# Patient Record
Sex: MALE | Race: BLACK OR AFRICAN AMERICAN | Employment: UNEMPLOYED | URBAN - METROPOLITAN AREA
[De-identification: names, ages, dates, MRNs, and addresses within clinical notes are randomized per-mention and may not be internally consistent; named-entity substitution may affect disease eponyms.]

---

## 2017-06-06 ENCOUNTER — TELEPHONE (OUTPATIENT)
Dept: SLEEP MEDICINE | Age: 47
End: 2017-06-06

## 2018-04-16 ENCOUNTER — TELEPHONE (OUTPATIENT)
Dept: SLEEP MEDICINE | Age: 48
End: 2018-04-16

## 2018-04-16 NOTE — TELEPHONE ENCOUNTER
I called the patient to schedule his sleep medicine consult. Patient states he will call back to set up this appointment he is a  and is on the road right now.

## 2018-06-17 ENCOUNTER — HOSPITAL ENCOUNTER (OUTPATIENT)
Dept: ULTRASOUND IMAGING | Age: 48
Discharge: HOME | End: 2018-06-17
Attending: FAMILY MEDICINE
Payer: COMMERCIAL

## 2018-06-17 DIAGNOSIS — N28.9 RENAL DISEASE: ICD-10-CM

## 2018-06-17 PROCEDURE — 76770 US EXAM ABDO BACK WALL COMP: CPT

## 2021-03-08 ENCOUNTER — OFFICE VISIT (OUTPATIENT)
Dept: FAMILY MEDICINE CLINIC | Age: 51
End: 2021-03-08

## 2021-03-08 VITALS
BODY MASS INDEX: 35.58 KG/M2 | HEART RATE: 81 BPM | SYSTOLIC BLOOD PRESSURE: 172 MMHG | WEIGHT: 240.2 LBS | TEMPERATURE: 97.6 F | HEIGHT: 69 IN | OXYGEN SATURATION: 97 % | DIASTOLIC BLOOD PRESSURE: 110 MMHG

## 2021-03-08 DIAGNOSIS — Z11.3 SCREEN FOR STD (SEXUALLY TRANSMITTED DISEASE): ICD-10-CM

## 2021-03-08 DIAGNOSIS — N63.10 LUMP OF RIGHT BREAST: ICD-10-CM

## 2021-03-08 DIAGNOSIS — I10 UNCONTROLLED HYPERTENSION: Primary | ICD-10-CM

## 2021-03-08 PROCEDURE — 84443 ASSAY THYROID STIM HORMONE: CPT

## 2021-03-08 PROCEDURE — 83036 HEMOGLOBIN GLYCOSYLATED A1C: CPT

## 2021-03-08 PROCEDURE — 86592 SYPHILIS TEST NON-TREP QUAL: CPT

## 2021-03-08 PROCEDURE — 85025 COMPLETE CBC W/AUTO DIFF WBC: CPT

## 2021-03-08 PROCEDURE — 87491 CHLMYD TRACH DNA AMP PROBE: CPT

## 2021-03-08 PROCEDURE — 99203 OFFICE O/P NEW LOW 30 MIN: CPT | Performed by: FAMILY MEDICINE

## 2021-03-08 PROCEDURE — 80061 LIPID PANEL: CPT

## 2021-03-08 PROCEDURE — 87389 HIV-1 AG W/HIV-1&-2 AB AG IA: CPT

## 2021-03-08 PROCEDURE — 80053 COMPREHEN METABOLIC PANEL: CPT

## 2021-03-08 RX ORDER — CLONIDINE HYDROCHLORIDE 0.3 MG/1
0.3 TABLET ORAL 2 TIMES DAILY
COMMUNITY
End: 2021-03-08 | Stop reason: SDUPTHER

## 2021-03-08 RX ORDER — AMLODIPINE BESYLATE 10 MG/1
10 TABLET ORAL DAILY
Qty: 90 TAB | Refills: 3 | Status: SHIPPED | OUTPATIENT
Start: 2021-03-08 | End: 2022-01-13

## 2021-03-08 RX ORDER — LISINOPRIL AND HYDROCHLOROTHIAZIDE 20; 25 MG/1; MG/1
TABLET ORAL DAILY
COMMUNITY
End: 2021-03-08 | Stop reason: SDUPTHER

## 2021-03-08 RX ORDER — AMLODIPINE BESYLATE 5 MG/1
5 TABLET ORAL DAILY
COMMUNITY
End: 2021-03-08

## 2021-03-08 RX ORDER — CLONIDINE HYDROCHLORIDE 0.3 MG/1
0.3 TABLET ORAL 2 TIMES DAILY
Qty: 90 TAB | Refills: 3 | Status: SHIPPED | OUTPATIENT
Start: 2021-03-08 | End: 2021-04-16 | Stop reason: SINTOL

## 2021-03-08 RX ORDER — LISINOPRIL AND HYDROCHLOROTHIAZIDE 20; 25 MG/1; MG/1
1 TABLET ORAL DAILY
Qty: 90 TAB | Refills: 3 | Status: SHIPPED
Start: 2021-03-08 | End: 2022-01-09

## 2021-03-08 NOTE — PROGRESS NOTES
Derrell Sheehan RN, gave to the pt the AVS information and also the information to call and make an appointment for a mammogram. Sara Garcia gave the Goodrx and explained the process and also the lab requisition for the patient go to [facility] to room 104.  Patient verbalized understanding

## 2021-03-08 NOTE — PROGRESS NOTES
HISTORY OF PRESENT ILLNESS  Maycol Singh is a 45 y.o. male. HPI  Patient states he has a history of hypertension diagnosed in 2007. 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 Clonidine was added and blood pressure with better control. He has felt a lump in the right breast and is tender,  Noticed it last week. Review of Systems   Constitutional: Negative for chills, fever and weight loss. Respiratory: Negative for cough, hemoptysis and sputum production. Cardiovascular: Negative for chest pain, palpitations and orthopnea. Lump of right breast   Gastrointestinal: Negative for heartburn, nausea and vomiting. Genitourinary: Negative for dysuria, frequency and urgency. Musculoskeletal: Negative for back pain, myalgias and neck pain. Skin: Negative for itching and rash. Neurological: Negative for dizziness, tingling, tremors and headaches. BP (!) 172/110 (BP 1 Location: Right arm, BP Patient Position: Sitting)   Pulse 81   Temp 97.6 °F (36.4 °C) (Temporal)   Ht 5' 8.9\" (1.75 m)   Wt 240 lb 3.2 oz (109 kg)   SpO2 97%   BMI 35.58 kg/m²   Physical Exam  Constitutional:       General: He is not in acute distress. Appearance: He is not ill-appearing. HENT:      Right Ear: Tympanic membrane normal. There is no impacted cerumen. Left Ear: Tympanic membrane normal. There is no impacted cerumen. Nose: Nose normal. No congestion. Mouth/Throat:      Mouth: Mucous membranes are moist.      Pharynx: No oropharyngeal exudate or posterior oropharyngeal erythema. Eyes:      Extraocular Movements: Extraocular movements intact. Pupils: Pupils are equal, round, and reactive to light. Cardiovascular:      Rate and Rhythm: Normal rate and regular rhythm. Pulses: Normal pulses. Heart sounds: Normal heart sounds. No murmur. Comments:  There is a 8 cm hard and tender palpable lump on the right breast  Pulmonary:      Effort: Pulmonary effort is normal. No respiratory distress. Breath sounds: Normal breath sounds. No wheezing or rhonchi. Abdominal:      General: Bowel sounds are normal. There is no distension. Palpations: Abdomen is soft. Tenderness: There is no abdominal tenderness. Hernia: No hernia is present. Musculoskeletal: Normal range of motion. General: No swelling, deformity or signs of injury. Skin:     General: Skin is warm. Coloration: Skin is not jaundiced. Findings: No bruising or erythema. Neurological:      Mental Status: He is alert. ASSESSMENT and PLAN  Diagnoses and all orders for this visit:    1. Uncontrolled hypertension  -     lisinopril-hydroCHLOROthiazide (PRINZIDE, ZESTORETIC) 20-25 mg per tablet; Take 1 Tab by mouth daily. 1 po daily  -     cloNIDine HCL (CATAPRES) 0.3 mg tablet; Take 1 Tab by mouth two (2) times a day. 3 po daily  -     amLODIPine (NORVASC) 10 mg tablet; Take 1 Tab by mouth daily.  -     CBC W/O DIFF; Future  -     METABOLIC PANEL, COMPREHENSIVE; Future  -     HEMOGLOBIN A1C WITH EAG; Future  -     LIPID PANEL; Future  -     TSH 3RD GENERATION; Future    2. Lump of right breast  -     RODNEY MAMMO RT DX INCL CAD; Future    3. Screen for STD (sexually transmitted disease)  -     RPR; Future  -     HIV 1/2 AG/AB, 4TH GENERATION,W RFLX CONFIRM; Future  -     CHLAMYDIA/GC PCR;  Future      45year old with uncontrolled blood pressure  He also has a right breast lump  We will refill medications, check labs and order a mammogram  Follow up in 6 weeks

## 2021-03-09 ENCOUNTER — HOSPITAL ENCOUNTER (OUTPATIENT)
Dept: LAB | Age: 51
Discharge: HOME | End: 2021-03-09

## 2021-03-09 ENCOUNTER — TELEPHONE (OUTPATIENT)
Dept: FAMILY MEDICINE CLINIC | Age: 51
End: 2021-03-09

## 2021-03-09 LAB
ALBUMIN SERPL-MCNC: 3.8 G/DL (ref 3.5–5)
ALBUMIN/GLOB SERPL: 1 {RATIO} (ref 1.1–2.2)
ALP SERPL-CCNC: 69 U/L (ref 45–117)
ALT SERPL-CCNC: 22 U/L (ref 12–78)
ANION GAP SERPL CALC-SCNC: 4 MMOL/L (ref 5–15)
AST SERPL-CCNC: 15 U/L (ref 15–37)
BASOPHILS # BLD: 0.1 K/UL (ref 0–0.1)
BASOPHILS NFR BLD: 1 % (ref 0–1)
BILIRUB SERPL-MCNC: 1 MG/DL (ref 0.2–1)
BUN SERPL-MCNC: 22 MG/DL (ref 6–20)
BUN/CREAT SERPL: 12 (ref 12–20)
CALCIUM SERPL-MCNC: 8.7 MG/DL (ref 8.5–10.1)
CHLORIDE SERPL-SCNC: 103 MMOL/L (ref 97–108)
CHOLEST SERPL-MCNC: 199 MG/DL
CO2 SERPL-SCNC: 29 MMOL/L (ref 21–32)
CREAT SERPL-MCNC: 1.79 MG/DL (ref 0.7–1.3)
DIFFERENTIAL METHOD BLD: ABNORMAL
EOSINOPHIL # BLD: 0 K/UL (ref 0–0.4)
EOSINOPHIL NFR BLD: 1 % (ref 0–7)
ERYTHROCYTE [DISTWIDTH] IN BLOOD BY AUTOMATED COUNT: 13.9 % (ref 11.5–14.5)
EST. AVERAGE GLUCOSE BLD GHB EST-MCNC: 100 MG/DL
GLOBULIN SER CALC-MCNC: 3.8 G/DL (ref 2–4)
GLUCOSE SERPL-MCNC: 85 MG/DL (ref 65–100)
HBA1C MFR BLD: 5.1 % (ref 4–5.6)
HCT VFR BLD AUTO: 46.8 % (ref 36.6–50.3)
HDLC SERPL-MCNC: 50 MG/DL
HDLC SERPL: 4 {RATIO} (ref 0–5)
HGB BLD-MCNC: 14.2 G/DL (ref 12.1–17)
HIV 1+2 AB+HIV1 P24 AG SERPL QL IA: NONREACTIVE
HIV12 RESULT COMMENT, HHIVC: NORMAL
IMM GRANULOCYTES # BLD AUTO: 0 K/UL (ref 0–0.04)
IMM GRANULOCYTES NFR BLD AUTO: 1 % (ref 0–0.5)
LDLC SERPL CALC-MCNC: 124.2 MG/DL (ref 0–100)
LIPID PROFILE,FLP: ABNORMAL
LYMPHOCYTES # BLD: 1.2 K/UL (ref 0.8–3.5)
LYMPHOCYTES NFR BLD: 25 % (ref 12–49)
MCH RBC QN AUTO: 28.2 PG (ref 26–34)
MCHC RBC AUTO-ENTMCNC: 30.3 G/DL (ref 30–36.5)
MCV RBC AUTO: 92.9 FL (ref 80–99)
MONOCYTES # BLD: 0.5 K/UL (ref 0–1)
MONOCYTES NFR BLD: 11 % (ref 5–13)
NEUTS SEG # BLD: 3 K/UL (ref 1.8–8)
NEUTS SEG NFR BLD: 61 % (ref 32–75)
NRBC # BLD: 0 K/UL (ref 0–0.01)
NRBC BLD-RTO: 0 PER 100 WBC
PLATELET # BLD AUTO: 193 K/UL (ref 150–400)
PMV BLD AUTO: 10.7 FL (ref 8.9–12.9)
POTASSIUM SERPL-SCNC: 3.5 MMOL/L (ref 3.5–5.1)
PROT SERPL-MCNC: 7.6 G/DL (ref 6.4–8.2)
RBC # BLD AUTO: 5.04 M/UL (ref 4.1–5.7)
RPR SER QL: NONREACTIVE
SODIUM SERPL-SCNC: 136 MMOL/L (ref 136–145)
TRIGL SERPL-MCNC: 124 MG/DL (ref ?–150)
TSH SERPL DL<=0.05 MIU/L-ACNC: 1.09 UIU/ML (ref 0.36–3.74)
VLDLC SERPL CALC-MCNC: 24.8 MG/DL
WBC # BLD AUTO: 4.8 K/UL (ref 4.1–11.1)

## 2021-03-09 NOTE — TELEPHONE ENCOUNTER
4 week f/u  F2F - needed at this time and also put the COVID-19 vaccine as well per Dr. Carleen Woody. We lm to call the main number.

## 2021-03-10 LAB
C TRACH DNA SPEC QL NAA+PROBE: NEGATIVE
N GONORRHOEA DNA SPEC QL NAA+PROBE: NEGATIVE
SAMPLE TYPE: NORMAL
SERVICE CMNT-IMP: NORMAL
SPECIMEN SOURCE: NORMAL

## 2021-03-10 NOTE — PROGRESS NOTES
Creatinine is elevated. This suggest some kidney problems that could be related to his blood pressure. I need to see him in 2-3 weeks.   Please add to my schedule  Normal blood count, cholesterol, thyroid, liver function test  Syphilis and Hiv negative

## 2021-03-11 ENCOUNTER — TELEPHONE (OUTPATIENT)
Dept: FAMILY MEDICINE CLINIC | Age: 51
End: 2021-03-11

## 2021-03-11 NOTE — LETTER
3/11/2021 2:03 PM 
 
Mr. Aydee Dobbins [address] [place] [id number] Dear Mr Bushra Carpenter, we have attempted to reach you by phone and have been unsuccessful. Please call [address] for a message from your Doctor. The [address] [facility] number is [phone]. Sincerely, Vika Kerr RN For/ 
 
Layla Torrez MD

## 2021-03-11 NOTE — PROGRESS NOTES
The pt was called 2x. No answer. A message was left on his VM to call the [facility] with a message from the Dr. The pt was printed out a lab letter but it was not mailed due to it contained HIV negative results. Researched and HIV results through [employer] and even negative results has to be F2F or over the phone. Not mailed. Shred the letter. Mailed a generic letter to call the [facility] office.  Luisa Price RN

## 2021-03-15 ENCOUNTER — HOSPITAL ENCOUNTER (OUTPATIENT)
Dept: MAMMOGRAPHY | Age: 51
Discharge: HOME | End: 2021-03-15
Attending: FAMILY MEDICINE
Payer: MEDICAID

## 2021-03-15 DIAGNOSIS — N63.10 LUMP OF RIGHT BREAST: ICD-10-CM

## 2021-03-15 PROCEDURE — 77066 DX MAMMO INCL CAD BI: CPT

## 2021-03-19 ENCOUNTER — TELEPHONE (OUTPATIENT)
Dept: FAMILY MEDICINE CLINIC | Age: 51
End: 2021-03-19

## 2021-03-19 NOTE — TELEPHONE ENCOUNTER
The pt called this nurse stating that someone had called him to schedule an appt. He was returning their call. The chart was reviewed. No appts were noted being scheduled for the pt. This nurse had attempted calling him a couple of times last week to give him his lab results message. The pt was given the message about his Creatinine being elevated and this could suggest kidney problems and that the provider had recommended an appt with him in 2-3 weeks. The pt agreed. The appt was scheduled for 03/29/21 at 2:30 pm. At the [facility] location. The pt asked for his Mammo results. The pt was told he would be called back once the provider had reviewed the results. Routed to provider.  Melchor Ribeiro RN

## 2021-03-29 ENCOUNTER — OFFICE VISIT (OUTPATIENT)
Dept: FAMILY MEDICINE CLINIC | Age: 51
End: 2021-03-29

## 2021-03-29 ENCOUNTER — HOSPITAL ENCOUNTER (OUTPATIENT)
Dept: LAB | Age: 51
Discharge: HOME | End: 2021-03-29

## 2021-03-29 VITALS
BODY MASS INDEX: 34.22 KG/M2 | HEART RATE: 77 BPM | TEMPERATURE: 97.7 F | DIASTOLIC BLOOD PRESSURE: 91 MMHG | HEIGHT: 70 IN | SYSTOLIC BLOOD PRESSURE: 154 MMHG | WEIGHT: 239 LBS

## 2021-03-29 DIAGNOSIS — G89.29 CHRONIC RIGHT-SIDED LOW BACK PAIN WITH RIGHT-SIDED SCIATICA: ICD-10-CM

## 2021-03-29 DIAGNOSIS — M54.41 CHRONIC RIGHT-SIDED LOW BACK PAIN WITH RIGHT-SIDED SCIATICA: ICD-10-CM

## 2021-03-29 DIAGNOSIS — N19 RENAL FAILURE, UNSPECIFIED CHRONICITY: ICD-10-CM

## 2021-03-29 DIAGNOSIS — I10 UNCONTROLLED HYPERTENSION: Primary | ICD-10-CM

## 2021-03-29 PROCEDURE — 99213 OFFICE O/P EST LOW 20 MIN: CPT | Performed by: FAMILY MEDICINE

## 2021-03-29 PROCEDURE — 80048 BASIC METABOLIC PNL TOTAL CA: CPT

## 2021-03-29 RX ORDER — CYCLOBENZAPRINE HCL 10 MG
10 TABLET ORAL
Qty: 40 TAB | Refills: 1 | Status: ON HOLD | OUTPATIENT
Start: 2021-03-29 | End: 2021-12-31

## 2021-03-29 NOTE — PROGRESS NOTES
I have copied the provider's check out note here and have reviewed these items with the patient today: Refer to nephrology at [facility], next available     Good rx   COVID-19 vaccine  Follow-up Instructions       Return in about 3 months (around 6/29/2021) for follow up hypertension. Patient scheduled for nephrology follow up and for COVID vaccine. I have printed AVS and reviewed it with patient today. Patient given AVS and instructed medications were sent to pharmacy of his choice and he verbalized understanding and stated that he will pick them up. Patient instructed that he will receive call for follow up visit in three months for hypertension. Patient verbalized understanding.  Yoel Regan RN

## 2021-03-29 NOTE — PROGRESS NOTES
HISTORY OF PRESENT ILLNESS  Andreina Sharma is a 45 y.o. male. HPI  Patient states he is doing well. The patient states he does have urinary frequency. Patient states He has a problem with sciatica,  When he stands for a long time or sitting for a while and then stands back up it hurts. If he is picking up  Objects from the ground he has a burning pain down the leg. He used to take naproxen for the back pain. Review of Systems   Constitutional: Negative for chills, fever and weight loss. Respiratory: Negative for cough, hemoptysis and sputum production. Cardiovascular: Negative for chest pain, palpitations and orthopnea. Gastrointestinal: Negative for heartburn, nausea and vomiting. Genitourinary: Negative for dysuria, frequency and urgency. Musculoskeletal: Negative for back pain, myalgias and neck pain. Skin: Negative for itching and rash. Neurological: Negative for dizziness, tingling, tremors and headaches. BP (!) 154/91 (BP 1 Location: Left upper arm, BP Patient Position: Sitting)   Pulse 77   Temp 97.7 °F (36.5 °C) (Temporal)   Ht 5' 10.24\" (1.784 m)   Wt 239 lb (108.4 kg)   BMI 34.06 kg/m²   Physical Exam  Constitutional:       General: He is not in acute distress. Appearance: He is not ill-appearing. HENT:      Right Ear: Tympanic membrane normal. There is no impacted cerumen. Left Ear: Tympanic membrane normal. There is no impacted cerumen. Nose: Nose normal. No congestion. Mouth/Throat:      Mouth: Mucous membranes are moist.      Pharynx: No oropharyngeal exudate or posterior oropharyngeal erythema. Eyes:      Extraocular Movements: Extraocular movements intact. Pupils: Pupils are equal, round, and reactive to light. Cardiovascular:      Rate and Rhythm: Normal rate and regular rhythm. Pulses: Normal pulses. Heart sounds: Normal heart sounds. No murmur. Comments:  There is a 8 cm hard and tender palpable lump on the right breast  Pulmonary:      Effort: Pulmonary effort is normal. No respiratory distress. Breath sounds: Normal breath sounds. No wheezing or rhonchi. Abdominal:      General: Bowel sounds are normal. There is no distension. Palpations: Abdomen is soft. Tenderness: There is no abdominal tenderness. Hernia: No hernia is present. Musculoskeletal: Normal range of motion. General: No swelling, deformity or signs of injury. Skin:     General: Skin is warm. Coloration: Skin is not jaundiced. Findings: No bruising or erythema. Neurological:      Mental Status: He is alert. ASSESSMENT and PLAN  Diagnoses and all orders for this visit:    1. Uncontrolled hypertension  -     REFERRAL TO NEPHROLOGY    2. Renal failure, unspecified chronicity  -     METABOLIC PANEL, BASIC; Future  -     REFERRAL TO NEPHROLOGY    3. Chronic right-sided low back pain with right-sided sciatica  -     cyclobenzaprine (FLEXERIL) 10 mg tablet; Take 1 Tab by mouth two (2) times daily as needed for Muscle Spasm(s).       Uncontrolled  Hypertension with elevated creatinine, he had used NSAIDS in the past,  We will repeat labs,  Refer to nephrology, avoid NSAIDS, repeat bmp today

## 2021-03-29 NOTE — PROGRESS NOTES
Coordination of Care  1. Have you been to the ER, urgent care clinic since your last visit? Hospitalized since your last visit? No    2. Have you seen or consulted any other health care providers outside of the [address] [place] [address] since your last visit? Include any pap smears or colon screening. No    Does the patient need refills? YES    Learning Assessment Complete?  yes  Depression Screening complete in the past 12 months? yes

## 2021-03-30 LAB
ANION GAP SERPL CALC-SCNC: 9 MMOL/L (ref 5–15)
BUN SERPL-MCNC: 24 MG/DL (ref 6–20)
BUN/CREAT SERPL: 14 (ref 12–20)
CALCIUM SERPL-MCNC: 8.9 MG/DL (ref 8.5–10.1)
CHLORIDE SERPL-SCNC: 103 MMOL/L (ref 97–108)
CO2 SERPL-SCNC: 27 MMOL/L (ref 21–32)
CREAT SERPL-MCNC: 1.7 MG/DL (ref 0.7–1.3)
GLUCOSE SERPL-MCNC: 105 MG/DL (ref 65–100)
POTASSIUM SERPL-SCNC: 3.2 MMOL/L (ref 3.5–5.1)
SODIUM SERPL-SCNC: 139 MMOL/L (ref 136–145)

## 2021-03-31 NOTE — PROGRESS NOTES
Creatinine is 1.7,  3 weeks ago it was 1.79.   Needs to stay well hydrated, avoid BC s avoid NSAIDS  Patient can be informed of results

## 2021-04-02 NOTE — PROGRESS NOTES
RN attempted to call pt. No answer, left message for return call to the [facility] main office.   Gifty Meadows RN

## 2021-04-05 NOTE — PROGRESS NOTES
Tc to the pt. He did not answer. A message was left to call the nurse back. A lab letter was printed and mailed to the pt.  Subhash Pop RN

## 2021-04-07 ENCOUNTER — OFFICE VISIT (OUTPATIENT)
Dept: FAMILY MEDICINE CLINIC | Age: 51
End: 2021-04-07

## 2021-04-07 ENCOUNTER — HOSPITAL ENCOUNTER (OUTPATIENT)
Dept: LAB | Age: 51
Discharge: HOME | End: 2021-04-07

## 2021-04-07 VITALS
WEIGHT: 238 LBS | HEART RATE: 95 BPM | DIASTOLIC BLOOD PRESSURE: 111 MMHG | BODY MASS INDEX: 34.07 KG/M2 | SYSTOLIC BLOOD PRESSURE: 178 MMHG | HEIGHT: 70 IN | TEMPERATURE: 98 F

## 2021-04-07 DIAGNOSIS — I10 HYPERTENSION, UNSPECIFIED TYPE: ICD-10-CM

## 2021-04-07 DIAGNOSIS — I10 HYPERTENSION, UNSPECIFIED TYPE: Primary | ICD-10-CM

## 2021-04-07 PROCEDURE — 84244 ASSAY OF RENIN: CPT

## 2021-04-07 PROCEDURE — 99213 OFFICE O/P EST LOW 20 MIN: CPT | Performed by: INTERNAL MEDICINE

## 2021-04-07 PROCEDURE — 82088 ASSAY OF ALDOSTERONE: CPT
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
## 2021-04-07 NOTE — PROGRESS NOTES
Coordination of Care  1. Have you been to the ER, urgent care clinic since your last visit? Hospitalized since your last visit? No    2. Have you seen or consulted any other health care providers outside of the [address] [place] [address] since your last visit? Include any pap smears or colon screening. No    Does the patient need refills? NO    Learning Assessment Complete? yes  Depression Screening complete in the past 12 months? yes    AVS printed, given and reviewed. Patient aware that provider would like to follow up about today's visit in 2 months . This has been fully explained to the patient, who indicates understanding and agrees with plan. No further questions at this time. Zuri Tyler RN   Patient was sent down to the lab to have aldosterone and Plasma Renin Activity - lab requisition and instructions given, instructed patient to have these down today.  Zuri Tyler RN

## 2021-04-13 LAB — RENIN PLAS-CCNC: 2.81 NG/ML/HR (ref 0.17–5.38)

## 2021-04-15 LAB — ALDOST SERPL-MCNC: 10.7 NG/DL (ref 0–30)

## 2021-04-16 DIAGNOSIS — I10 HYPERTENSION, UNSPECIFIED TYPE: Primary | ICD-10-CM

## 2021-04-16 RX ORDER — TRIAMTERENE CAPSULES 50 MG/1
50 CAPSULE ORAL DAILY
Qty: 30 CAP | Refills: 3 | Status: SHIPPED
Start: 2021-04-16 | End: 2022-01-09

## 2021-04-16 NOTE — PROGRESS NOTES
So,with +/- high renin and normal aldosterone, he doesn't have primary aldosteronism. I want to use an anti-renin/jessi agent, but the hx of gynecomastia argues against spironolactone. He is worried about ED. Stop clonidine. Begin Triamterene 50mg daily. Labs before RTC.   Marek Nevarez

## 2021-04-16 NOTE — PROGRESS NOTES
Pt called and I discussed the message from Dr Chandrakant Crespo. He is aware to stop clonidine. He will start triamterene as advised. He is aware that I am sending his chart to front office to schedule for labs before his f/up with Dr Chandrakant Crespo. Additionally, I am sending the chart to his PCP, Dr Jarocho Galeano as the pt is complaining of having no relief to his \"sciatica\" with the flexeril. We discussed that he is NOT to take NSAIDs due to his htn and kidney function but he states that even small tasks like bending to lift his granddaughter are very painful.     Labs needed before his next appt CMP

## 2021-05-06 NOTE — TELEPHONE ENCOUNTER
[facility] patient, needs an appt. With Dr. Alysha Sultana 06/09/2021. Needs a non fasting lab appt. Prior the appt. With Dr. Alysha uSltana. Patient was also in need of a follow up face to face visit with Dr. Kristi Rivero for back pain.      Routing to [facility] front office

## 2021-05-20 ENCOUNTER — TELEPHONE (OUTPATIENT)
Dept: FAMILY MEDICINE CLINIC | Age: 51
End: 2021-05-20

## 2021-05-20 NOTE — TELEPHONE ENCOUNTER
I called [place], and confirmed that patient has active Medicaid. I called the patient to let him know appointments with Cheryl Dodge are being cancel due to his active Medicaid, no answer, voicemail message left.

## 2021-10-01 ENCOUNTER — TRANSCRIBE ORDER (OUTPATIENT)
Dept: SCHEDULING | Age: 51
End: 2021-10-01

## 2021-10-01 DIAGNOSIS — M54.2 CERVICALGIA: Primary | ICD-10-CM

## 2021-10-04 ENCOUNTER — TRANSCRIBE ORDER (OUTPATIENT)
Dept: SCHEDULING | Age: 51
End: 2021-10-04

## 2021-10-04 DIAGNOSIS — M43.16 SPONDYLOLISTHESIS OF LUMBAR REGION: Primary | ICD-10-CM

## 2021-10-04 DIAGNOSIS — S39.012A ACUTE MYOFASCIAL STRAIN OF LUMBAR REGION, INITIAL ENCOUNTER: ICD-10-CM

## 2021-12-26 ENCOUNTER — HOSPITAL ENCOUNTER (EMERGENCY)
Age: 51
Discharge: HOME | End: 2021-12-26
Attending: EMERGENCY MEDICINE
Payer: MEDICAID

## 2021-12-26 ENCOUNTER — APPOINTMENT (OUTPATIENT)
Dept: GENERAL RADIOLOGY | Age: 51
End: 2021-12-26
Attending: EMERGENCY MEDICINE
Payer: MEDICAID

## 2021-12-26 VITALS
HEART RATE: 102 BPM | OXYGEN SATURATION: 92 % | RESPIRATION RATE: 24 BRPM | DIASTOLIC BLOOD PRESSURE: 84 MMHG | TEMPERATURE: 99.7 F | SYSTOLIC BLOOD PRESSURE: 130 MMHG | HEIGHT: 70 IN | BODY MASS INDEX: 34.36 KG/M2 | WEIGHT: 240 LBS

## 2021-12-26 DIAGNOSIS — U07.1 COVID-19: Primary | ICD-10-CM

## 2021-12-26 DIAGNOSIS — E87.6 HYPOKALEMIA: ICD-10-CM

## 2021-12-26 DIAGNOSIS — N17.9 AKI (ACUTE KIDNEY INJURY) (HCC): ICD-10-CM

## 2021-12-26 LAB
ALBUMIN SERPL-MCNC: 3.5 G/DL (ref 3.5–5)
ALBUMIN/GLOB SERPL: 0.9 {RATIO} (ref 1.1–2.2)
ALP SERPL-CCNC: 60 U/L (ref 45–117)
ALT SERPL-CCNC: 30 U/L (ref 12–78)
ANION GAP SERPL CALC-SCNC: 8 MMOL/L (ref 5–15)
AST SERPL-CCNC: 31 U/L (ref 15–37)
BASOPHILS # BLD: 0.1 K/UL (ref 0–0.1)
BASOPHILS NFR BLD: 1 % (ref 0–1)
BILIRUB SERPL-MCNC: 1 MG/DL (ref 0.2–1)
BUN SERPL-MCNC: 21 MG/DL (ref 6–20)
BUN/CREAT SERPL: 9 (ref 12–20)
CALCIUM SERPL-MCNC: 8.8 MG/DL (ref 8.5–10.1)
CHLORIDE SERPL-SCNC: 102 MMOL/L (ref 97–108)
CO2 SERPL-SCNC: 30 MMOL/L (ref 21–32)
CREAT SERPL-MCNC: 2.25 MG/DL (ref 0.7–1.3)
DIFFERENTIAL METHOD BLD: ABNORMAL
EOSINOPHIL # BLD: 0 K/UL (ref 0–0.4)
EOSINOPHIL NFR BLD: 0 % (ref 0–7)
ERYTHROCYTE [DISTWIDTH] IN BLOOD BY AUTOMATED COUNT: 13.6 % (ref 11.5–14.5)
FLUAV RNA SPEC QL NAA+PROBE: NOT DETECTED
FLUBV RNA SPEC QL NAA+PROBE: NOT DETECTED
GLOBULIN SER CALC-MCNC: 3.9 G/DL (ref 2–4)
GLUCOSE SERPL-MCNC: 116 MG/DL (ref 65–100)
HCT VFR BLD AUTO: 43.4 % (ref 36.6–50.3)
HGB BLD-MCNC: 13.9 G/DL (ref 12.1–17)
IMM GRANULOCYTES # BLD AUTO: 0 K/UL (ref 0–0.04)
IMM GRANULOCYTES NFR BLD AUTO: 0 % (ref 0–0.5)
LYMPHOCYTES # BLD: 0.6 K/UL (ref 0.8–3.5)
LYMPHOCYTES NFR BLD: 12 % (ref 12–49)
MCH RBC QN AUTO: 28.8 PG (ref 26–34)
MCHC RBC AUTO-ENTMCNC: 32 G/DL (ref 30–36.5)
MCV RBC AUTO: 89.9 FL (ref 80–99)
MONOCYTES # BLD: 1.2 K/UL (ref 0–1)
MONOCYTES NFR BLD: 25 % (ref 5–13)
NEUTS SEG # BLD: 3 K/UL (ref 1.8–8)
NEUTS SEG NFR BLD: 62 % (ref 32–75)
NRBC # BLD: 0 K/UL (ref 0–0.01)
NRBC BLD-RTO: 0 PER 100 WBC
PLATELET # BLD AUTO: 187 K/UL (ref 150–400)
PMV BLD AUTO: 9.8 FL (ref 8.9–12.9)
POTASSIUM SERPL-SCNC: 3.1 MMOL/L (ref 3.5–5.1)
PROT SERPL-MCNC: 7.4 G/DL (ref 6.4–8.2)
RBC # BLD AUTO: 4.83 M/UL (ref 4.1–5.7)
RBC MORPH BLD: ABNORMAL
SARS-COV-2, COV2: DETECTED
SODIUM SERPL-SCNC: 140 MMOL/L (ref 136–145)
WBC # BLD AUTO: 4.9 K/UL (ref 4.1–11.1)

## 2021-12-26 PROCEDURE — 99284 EMERGENCY DEPT VISIT MOD MDM: CPT

## 2021-12-26 PROCEDURE — 85025 COMPLETE CBC W/AUTO DIFF WBC: CPT

## 2021-12-26 PROCEDURE — 80053 COMPREHEN METABOLIC PANEL: CPT

## 2021-12-26 PROCEDURE — 74011250637 HC RX REV CODE- 250/637: Performed by: EMERGENCY MEDICINE

## 2021-12-26 PROCEDURE — 87636 SARSCOV2 & INF A&B AMP PRB: CPT

## 2021-12-26 PROCEDURE — 96361 HYDRATE IV INFUSION ADD-ON: CPT

## 2021-12-26 PROCEDURE — 74011250636 HC RX REV CODE- 250/636: Performed by: EMERGENCY MEDICINE

## 2021-12-26 PROCEDURE — 36415 COLL VENOUS BLD VENIPUNCTURE: CPT

## 2021-12-26 PROCEDURE — 71045 X-RAY EXAM CHEST 1 VIEW: CPT

## 2021-12-26 PROCEDURE — 96374 THER/PROPH/DIAG INJ IV PUSH: CPT

## 2021-12-26 RX ORDER — ACETAMINOPHEN 500 MG
1000 TABLET ORAL ONCE
Status: COMPLETED | OUTPATIENT
Start: 2021-12-26 | End: 2021-12-26

## 2021-12-26 RX ORDER — KETOROLAC TROMETHAMINE 30 MG/ML
30 INJECTION, SOLUTION INTRAMUSCULAR; INTRAVENOUS
Status: COMPLETED | OUTPATIENT
Start: 2021-12-26 | End: 2021-12-26

## 2021-12-26 RX ORDER — POTASSIUM CHLORIDE 750 MG/1
40 TABLET, FILM COATED, EXTENDED RELEASE ORAL
Status: COMPLETED | OUTPATIENT
Start: 2021-12-26 | End: 2021-12-26

## 2021-12-26 RX ADMIN — ACETAMINOPHEN 1000 MG: 500 TABLET ORAL at 12:58

## 2021-12-26 RX ADMIN — POTASSIUM CHLORIDE 40 MEQ: 750 TABLET, EXTENDED RELEASE ORAL at 16:06

## 2021-12-26 RX ADMIN — KETOROLAC TROMETHAMINE 30 MG: 30 INJECTION, SOLUTION INTRAMUSCULAR; INTRAVENOUS at 13:17

## 2021-12-26 RX ADMIN — SODIUM CHLORIDE 1000 ML: 9 INJECTION, SOLUTION INTRAVENOUS at 13:16

## 2021-12-26 NOTE — ED NOTES
Patient was ambulated. Sitting on side of bed was 97-98%, while ambulating patient maintained sats of >95%. Dr. Herrera Socks updated.

## 2021-12-26 NOTE — Clinical Note
[facility] EMERGENCY DEPT  [address] [id number] [phone]    Work/School Note    Date: 12/26/2021     To Whom It May concern:    Brandon Mast was evaluated by the following provider(s):  Attending Provider: Senait Cano virus is suspected. Per the CDC guidelines we recommend home isolation until the following conditions are all met:    1. At least 10 days have passed since symptoms first appeared and  2. At least 24 hours have passed since last fever without the use of fever-reducing medications and  3.  Symptoms (e.g., cough, shortness of breath) have improved      Sincerely,          Fredis Garcia MD

## 2021-12-26 NOTE — ED NOTES
Emergency Department Nursing Plan of Care       The Nursing Plan of Care is developed from the Nursing assessment and Emergency Department Attending provider initial evaluation. The plan of care may be reviewed in the ED Provider note.     The Plan of Care was developed with the following considerations:   Patient / Family readiness to learn indicated by:verbalized understanding  Persons(s) to be included in education: patient  Barriers to Learning/Limitations:No    Signed     Yonatan Gaines RN    12/26/2021   1:27 PM

## 2021-12-26 NOTE — ED PROVIDER NOTES
EMERGENCY DEPARTMENT HISTORY AND PHYSICAL EXAM      Date: 12/26/2021  Patient Name: Roberto Mccrary    History of Presenting Illness     Chief Complaint   Patient presents with    Concern For [id number]-[id number] (Coronavirus)       History Provided By: Patient    HPI: Roberto Mccrary, 45 y.o. male with PMHx significant for hypertension who presents with a chief complaint of shortness of breath, body aches, and feeling hot and cold. Symptoms have been ongoing for about 3 days and have gotten progressively worse. He is unvaccinated for COVID-19. He denies any nausea, vomiting, diarrhea. PCP: None    There are no other complaints, changes, or physical findings at this time. Current Outpatient Medications   Medication Sig Dispense Refill    triamterene (DYRENIUM) 50 mg capsule Take 1 Cap by mouth daily. 30 Cap 3    cyclobenzaprine (FLEXERIL) 10 mg tablet Take 1 Tab by mouth two (2) times daily as needed for Muscle Spasm(s). (Patient not taking: Reported on 12/26/2021) 40 Tab 1    lisinopril-hydroCHLOROthiazide (PRINZIDE, ZESTORETIC) 20-25 mg per tablet Take 1 Tab by mouth daily. 1 po daily 90 Tab 3    amLODIPine (NORVASC) 10 mg tablet Take 1 Tab by mouth daily. 80 Tab 3     Past History     Past Medical History:  Past Medical History:   Diagnosis Date    Hypertension      Past Surgical History:  History reviewed. No pertinent surgical history. Family History:  History reviewed. No pertinent family history. Social History:  Social History     Tobacco Use    Smoking status: Never Smoker    Smokeless tobacco: Never Used   Substance Use Topics    Alcohol use: Not Currently    Drug use: Never     Allergies:  No Known Allergies  Review of Systems   Review of Systems   Constitutional: Negative for chills and fever. HENT: Negative for congestion, rhinorrhea and sore throat. Respiratory: Positive for shortness of breath. Negative for cough. Cardiovascular: Negative for chest pain.    Gastrointestinal: Negative for abdominal pain, nausea and vomiting. Genitourinary: Negative for dysuria and urgency. Skin: Negative for rash. Neurological: Negative for dizziness, light-headedness and headaches. All other systems reviewed and are negative. Physical Exam   Physical Exam  Vitals and nursing note reviewed. Constitutional:       General: He is not in acute distress. Appearance: He is well-developed. HENT:      Head: Normocephalic and atraumatic. Eyes:      Conjunctiva/sclera: Conjunctivae normal.      Pupils: Pupils are equal, round, and reactive to light. Cardiovascular:      Rate and Rhythm: Regular rhythm. Tachycardia present. Pulmonary:      Effort: Pulmonary effort is normal. No respiratory distress. Breath sounds: Normal breath sounds. No stridor. Comments: Speaking in complete sentences  Abdominal:      General: There is no distension. Palpations: Abdomen is soft. Tenderness: There is no abdominal tenderness. Musculoskeletal:         General: Normal range of motion. Cervical back: Normal range of motion. Skin:     General: Skin is warm and dry. Neurological:      Mental Status: He is alert and oriented to person, place, and time.        Diagnostic Study Results   Labs -     Recent Results (from the past 12 hour(s))   COVID-19 WITH INFLUENZA A/B    Collection Time: 12/26/21 12:57 PM   Result Value Ref Range    SARS-CoV-2 Detected (AA) NOTD      Influenza A by PCR Not detected      Influenza B by PCR Not detected     CBC WITH AUTOMATED DIFF    Collection Time: 12/26/21 12:57 PM   Result Value Ref Range    WBC 4.9 4.1 - 11.1 K/uL    RBC 4.83 4.10 - 5.70 M/uL    HGB 13.9 12.1 - 17.0 g/dL    HCT 43.4 36.6 - 50.3 %    MCV 89.9 80.0 - 99.0 FL    MCH 28.8 26.0 - 34.0 PG    MCHC 32.0 30.0 - 36.5 g/dL    RDW 13.6 11.5 - 14.5 %    PLATELET 413 481 - 184 K/uL    MPV 9.8 8.9 - 12.9 FL    NRBC 0.0 0  WBC    ABSOLUTE NRBC 0.00 0.00 - 0.01 K/uL    NEUTROPHILS 62 32 - 75 % LYMPHOCYTES 12 12 - 49 %    MONOCYTES 25 (H) 5 - 13 %    EOSINOPHILS 0 0 - 7 %    BASOPHILS 1 0 - 1 %    IMMATURE GRANULOCYTES 0 0.0 - 0.5 %    ABS. NEUTROPHILS 3.0 1.8 - 8.0 K/UL    ABS. LYMPHOCYTES 0.6 (L) 0.8 - 3.5 K/UL    ABS. MONOCYTES 1.2 (H) 0.0 - 1.0 K/UL    ABS. EOSINOPHILS 0.0 0.0 - 0.4 K/UL    ABS. BASOPHILS 0.1 0.0 - 0.1 K/UL    ABS. IMM. GRANS. 0.0 0.00 - 0.04 K/UL    DF SMEAR SCANNED      RBC COMMENTS NORMOCYTIC, NORMOCHROMIC     METABOLIC PANEL, COMPREHENSIVE    Collection Time: 12/26/21 12:57 PM   Result Value Ref Range    Sodium 140 136 - 145 mmol/L    Potassium 3.1 (L) 3.5 - 5.1 mmol/L    Chloride 102 97 - 108 mmol/L    CO2 30 21 - 32 mmol/L    Anion gap 8 5 - 15 mmol/L    Glucose 116 (H) 65 - 100 mg/dL    BUN 21 (H) 6 - 20 MG/DL    Creatinine 2.25 (H) 0.70 - 1.30 MG/DL    BUN/Creatinine ratio 9 (L) 12 - 20      GFR est AA 37 (L) >60 ml/min/1.73m2    GFR est non-AA 31 (L) >60 ml/min/1.73m2    Calcium 8.8 8.5 - 10.1 MG/DL    Bilirubin, total 1.0 0.2 - 1.0 MG/DL    ALT (SGPT) 30 12 - 78 U/L    AST (SGOT) 31 15 - 37 U/L    Alk. phosphatase 60 45 - 117 U/L    Protein, total 7.4 6.4 - 8.2 g/dL    Albumin 3.5 3.5 - 5.0 g/dL    Globulin 3.9 2.0 - 4.0 g/dL    A-G Ratio 0.9 (L) 1.1 - 2.2         Radiologic Studies -   XR CHEST PORT   Final Result   No acute process. XR CHEST PORT    Result Date: 12/26/2021  No acute process. Medical Decision Making   I am the first provider for this patient. I reviewed the vital signs, available nursing notes, past medical history, past surgical history, family history and social history. Vital Signs-Reviewed the patient's vital signs.   Patient Vitals for the past 12 hrs:   Temp Pulse Resp BP SpO2   12/26/21 1438 99.7 °F (37.6 °C) (!) 102 24 130/84 92 %   12/26/21 1259 -- (!) 115 24 (!) 160/112 93 %   12/26/21 1219 (!) 102.8 °F (39.3 °C) (!) 115 20 (!) 199/126 90 %       Pulse Oximetry Analysis - 93% on ra      Records Reviewed: Nursing Notes and Old Medical Records    Provider Notes (Medical Decision Making):   Patient presents with shortness of breath, body aches, feeling hot and cold. On presentation he is febrile tachycardic. Suspect likely COVID-19 versus pneumonia. Will check basic lab work, chest x-ray, Covid test.  Will give fluids and antipyretics. ED Course:   Initial assessment performed. The patients presenting problems have been discussed, and they are in agreement with the care plan formulated and outlined with them. I have encouraged them to ask questions as they arise throughout their visit. Patient was noted to have periods of desaturation while in bed, however was sleeping and reports he does have a history of sleep apnea. Will obtain ambulatory pulse ox    ED Course as of 12/26/21 1901   Sonia Zhang Dec 26, 2021   1447 Patient able to ambulate and maintain pulse ox >95% [[id number]]      ED Course User Index  Carlos Bonilla MD       Procedures:  Procedures    Critical Care:  none    Disposition:  Discharge Note:  The patient has been re-evaluated and is ready for discharge. Reviewed available results with patient. Counseled patient on diagnosis and care plan. Patient has expressed understanding, and all questions have been answered. Patient agrees with plan and agrees to follow up as recommended, or to return to the ED if their symptoms worsen. Discharge instructions have been provided and explained to the patient, along with reasons to return to the ED. PLAN:  1. Discharge Medication List as of 12/26/2021  3:12 PM        2. Follow-up Information     Follow up With Specialties Details Why [address] [facility] EMERGENCY DEPT Emergency Medicine  As needed, If symptoms worsen [facility]  [phone]        Return to ED if worse     Diagnosis     Clinical Impression:   1. COVID-19    2. RUTH (acute kidney injury) ([facility] [address]    3.  Hypokalemia            Please note that this dictation was completed with Dragon, the computer voice recognition software. Quite often unanticipated grammatical, syntax, homophones, and other interpretive errors are inadvertently transcribed by the computer software. Please disregard these errors.   Please excuse any errors that have escaped final proofreading

## 2021-12-26 NOTE — ED NOTES
Patient reports headache, body aches, congestion and productive cough all starting Friday and worsening last night.

## 2021-12-28 NOTE — ED NOTES
Pt is on the phone asking where his prescriptions were sent. I informed him that I do not see where we sent any prescriptions. Pt informed to call PCP or follow up for blood pressure medication refills.

## 2021-12-31 ENCOUNTER — HOSPITAL ENCOUNTER (INPATIENT)
Age: 51
LOS: 4 days | Discharge: ANOTHER HOSPITAL | DRG: 720 | End: 2022-01-04
Attending: EMERGENCY MEDICINE | Admitting: STUDENT IN AN ORGANIZED HEALTH CARE EDUCATION/TRAINING PROGRAM
Payer: MEDICAID

## 2021-12-31 ENCOUNTER — APPOINTMENT (OUTPATIENT)
Dept: GENERAL RADIOLOGY | Age: 51
DRG: 720 | End: 2021-12-31
Attending: PHYSICIAN ASSISTANT
Payer: MEDICAID

## 2021-12-31 DIAGNOSIS — I10 PRIMARY HYPERTENSION: ICD-10-CM

## 2021-12-31 DIAGNOSIS — U07.1 PNEUMONIA DUE TO COVID-19 VIRUS: Primary | ICD-10-CM

## 2021-12-31 DIAGNOSIS — J12.82 PNEUMONIA DUE TO COVID-19 VIRUS: Primary | ICD-10-CM

## 2021-12-31 LAB
ALBUMIN SERPL-MCNC: 3.3 G/DL (ref 3.5–5)
ALBUMIN/GLOB SERPL: 0.8 {RATIO} (ref 1.1–2.2)
ALP SERPL-CCNC: 50 U/L (ref 45–117)
ALT SERPL-CCNC: 43 U/L (ref 12–78)
ANION GAP SERPL CALC-SCNC: 12 MMOL/L (ref 5–15)
APPEARANCE UR: CLEAR
APTT PPP: 30.7 SEC (ref 22.1–31)
AST SERPL-CCNC: 61 U/L (ref 15–37)
BACTERIA URNS QL MICRO: NEGATIVE /HPF
BASOPHILS # BLD: 0 K/UL (ref 0–0.1)
BASOPHILS NFR BLD: 0 % (ref 0–1)
BILIRUB SERPL-MCNC: 1.6 MG/DL (ref 0.2–1)
BILIRUB UR QL: NEGATIVE
BUN SERPL-MCNC: 27 MG/DL (ref 6–20)
BUN/CREAT SERPL: 11 (ref 12–20)
CALCIUM SERPL-MCNC: 8.4 MG/DL (ref 8.5–10.1)
CHLORIDE SERPL-SCNC: 98 MMOL/L (ref 97–108)
CO2 SERPL-SCNC: 26 MMOL/L (ref 21–32)
COLOR UR: ABNORMAL
COMMENT, HOLDF: NORMAL
CREAT SERPL-MCNC: 2.44 MG/DL (ref 0.7–1.3)
CRP SERPL-MCNC: 5.82 MG/DL (ref 0–0.6)
D DIMER PPP FEU-MCNC: 1.15 MG/L FEU (ref 0–0.65)
DIFFERENTIAL METHOD BLD: ABNORMAL
EOSINOPHIL # BLD: 0 K/UL (ref 0–0.4)
EOSINOPHIL NFR BLD: 0 % (ref 0–7)
EPITH CASTS URNS QL MICRO: ABNORMAL /LPF
ERYTHROCYTE [DISTWIDTH] IN BLOOD BY AUTOMATED COUNT: 13.2 % (ref 11.5–14.5)
FERRITIN SERPL-MCNC: 1392 NG/ML (ref 26–388)
FIBRINOGEN PPP-MCNC: 488 MG/DL (ref 200–475)
GLOBULIN SER CALC-MCNC: 4.3 G/DL (ref 2–4)
GLUCOSE SERPL-MCNC: 113 MG/DL (ref 65–100)
GLUCOSE UR STRIP.AUTO-MCNC: NEGATIVE MG/DL
HCT VFR BLD AUTO: 44.5 % (ref 36.6–50.3)
HGB BLD-MCNC: 14.5 G/DL (ref 12.1–17)
HGB UR QL STRIP: ABNORMAL
IMM GRANULOCYTES # BLD AUTO: 0 K/UL (ref 0–0.04)
IMM GRANULOCYTES NFR BLD AUTO: 1 % (ref 0–0.5)
INR PPP: 1.1 (ref 0.9–1.1)
KETONES UR QL STRIP.AUTO: 15 MG/DL
LACTATE BLD-SCNC: 0.9 MMOL/L (ref 0.4–2)
LDH SERPL L TO P-CCNC: 496 U/L (ref 85–241)
LEUKOCYTE ESTERASE UR QL STRIP.AUTO: NEGATIVE
LYMPHOCYTES # BLD: 0.5 K/UL (ref 0.8–3.5)
LYMPHOCYTES NFR BLD: 15 % (ref 12–49)
MCH RBC QN AUTO: 28.5 PG (ref 26–34)
MCHC RBC AUTO-ENTMCNC: 32.6 G/DL (ref 30–36.5)
MCV RBC AUTO: 87.4 FL (ref 80–99)
MONOCYTES # BLD: 0.5 K/UL (ref 0–1)
MONOCYTES NFR BLD: 15 % (ref 5–13)
NEUTS SEG # BLD: 2.5 K/UL (ref 1.8–8)
NEUTS SEG NFR BLD: 69 % (ref 32–75)
NITRITE UR QL STRIP.AUTO: NEGATIVE
NRBC # BLD: 0 K/UL (ref 0–0.01)
NRBC BLD-RTO: 0 PER 100 WBC
PH UR STRIP: 6 [PH] (ref 5–8)
PLATELET # BLD AUTO: 152 K/UL (ref 150–400)
PMV BLD AUTO: 10.7 FL (ref 8.9–12.9)
POTASSIUM SERPL-SCNC: 3.3 MMOL/L (ref 3.5–5.1)
PROCALCITONIN SERPL-MCNC: 0.12 NG/ML
PROT SERPL-MCNC: 7.6 G/DL (ref 6.4–8.2)
PROT UR STRIP-MCNC: 100 MG/DL
PROTHROMBIN TIME: 10.5 SEC (ref 9–11.1)
RBC # BLD AUTO: 5.09 M/UL (ref 4.1–5.7)
RBC #/AREA URNS HPF: ABNORMAL /HPF (ref 0–5)
RBC MORPH BLD: ABNORMAL
SAMPLES BEING HELD,HOLD: NORMAL
SODIUM SERPL-SCNC: 136 MMOL/L (ref 136–145)
SP GR UR REFRACTOMETRY: 1.01 (ref 1–1.03)
THERAPEUTIC RANGE,PTTT: NORMAL SECS (ref 58–77)
UA: UC IF INDICATED,UAUC: ABNORMAL
UROBILINOGEN UR QL STRIP.AUTO: >8 EU/DL (ref 0.2–1)
WBC # BLD AUTO: 3.5 K/UL (ref 4.1–11.1)
WBC URNS QL MICRO: ABNORMAL /HPF (ref 0–4)

## 2021-12-31 PROCEDURE — 65270000032 HC RM SEMIPRIVATE

## 2021-12-31 PROCEDURE — 74011250636 HC RX REV CODE- 250/636: Performed by: PHYSICIAN ASSISTANT

## 2021-12-31 PROCEDURE — 87040 BLOOD CULTURE FOR BACTERIA: CPT

## 2021-12-31 PROCEDURE — 84145 PROCALCITONIN (PCT): CPT

## 2021-12-31 PROCEDURE — 83615 LACTATE (LD) (LDH) ENZYME: CPT

## 2021-12-31 PROCEDURE — 93005 ELECTROCARDIOGRAM TRACING: CPT

## 2021-12-31 PROCEDURE — 83605 ASSAY OF LACTIC ACID: CPT

## 2021-12-31 PROCEDURE — 86140 C-REACTIVE PROTEIN: CPT

## 2021-12-31 PROCEDURE — 85730 THROMBOPLASTIN TIME PARTIAL: CPT

## 2021-12-31 PROCEDURE — 81001 URINALYSIS AUTO W/SCOPE: CPT

## 2021-12-31 PROCEDURE — XW0DXM6 INTRODUCTION OF BARICITINIB INTO MOUTH AND PHARYNX, EXTERNAL APPROACH, NEW TECHNOLOGY GROUP 6: ICD-10-PCS | Performed by: STUDENT IN AN ORGANIZED HEALTH CARE EDUCATION/TRAINING PROGRAM

## 2021-12-31 PROCEDURE — 74011250637 HC RX REV CODE- 250/637: Performed by: STUDENT IN AN ORGANIZED HEALTH CARE EDUCATION/TRAINING PROGRAM

## 2021-12-31 PROCEDURE — 82728 ASSAY OF FERRITIN: CPT

## 2021-12-31 PROCEDURE — 96375 TX/PRO/DX INJ NEW DRUG ADDON: CPT

## 2021-12-31 PROCEDURE — 85610 PROTHROMBIN TIME: CPT

## 2021-12-31 PROCEDURE — 87449 NOS EACH ORGANISM AG IA: CPT

## 2021-12-31 PROCEDURE — 71045 X-RAY EXAM CHEST 1 VIEW: CPT

## 2021-12-31 PROCEDURE — 99285 EMERGENCY DEPT VISIT HI MDM: CPT

## 2021-12-31 PROCEDURE — 36415 COLL VENOUS BLD VENIPUNCTURE: CPT

## 2021-12-31 PROCEDURE — 74011000250 HC RX REV CODE- 250: Performed by: PHYSICIAN ASSISTANT

## 2021-12-31 PROCEDURE — 96374 THER/PROPH/DIAG INJ IV PUSH: CPT

## 2021-12-31 PROCEDURE — 94760 N-INVAS EAR/PLS OXIMETRY 1: CPT

## 2021-12-31 PROCEDURE — 77010033678 HC OXYGEN DAILY

## 2021-12-31 PROCEDURE — 74011250637 HC RX REV CODE- 250/637: Performed by: EMERGENCY MEDICINE

## 2021-12-31 PROCEDURE — 85025 COMPLETE CBC W/AUTO DIFF WBC: CPT

## 2021-12-31 PROCEDURE — 74011250636 HC RX REV CODE- 250/636: Performed by: STUDENT IN AN ORGANIZED HEALTH CARE EDUCATION/TRAINING PROGRAM

## 2021-12-31 PROCEDURE — 80053 COMPREHEN METABOLIC PANEL: CPT

## 2021-12-31 PROCEDURE — 85384 FIBRINOGEN ACTIVITY: CPT

## 2021-12-31 PROCEDURE — 85379 FIBRIN DEGRADATION QUANT: CPT

## 2021-12-31 RX ORDER — ASCORBIC ACID 500 MG
500 TABLET ORAL 2 TIMES DAILY
Status: DISCONTINUED | OUTPATIENT
Start: 2022-01-01 | End: 2022-01-04 | Stop reason: HOSPADM

## 2021-12-31 RX ORDER — ONDANSETRON 2 MG/ML
4 INJECTION INTRAMUSCULAR; INTRAVENOUS
Status: DISCONTINUED | OUTPATIENT
Start: 2021-12-31 | End: 2022-01-01

## 2021-12-31 RX ORDER — ZINC SULFATE 50(220)MG
1 CAPSULE ORAL DAILY
Status: DISCONTINUED | OUTPATIENT
Start: 2022-01-01 | End: 2022-01-04 | Stop reason: HOSPADM

## 2021-12-31 RX ORDER — HEPARIN SODIUM 5000 [USP'U]/ML
5000 INJECTION, SOLUTION INTRAVENOUS; SUBCUTANEOUS EVERY 8 HOURS
Status: DISCONTINUED | OUTPATIENT
Start: 2021-12-31 | End: 2022-01-01

## 2021-12-31 RX ORDER — SODIUM CHLORIDE 9 MG/ML
75 INJECTION, SOLUTION INTRAVENOUS CONTINUOUS
Status: DISCONTINUED | OUTPATIENT
Start: 2021-12-31 | End: 2022-01-01

## 2021-12-31 RX ORDER — AMLODIPINE BESYLATE 5 MG/1
10 TABLET ORAL DAILY
Status: DISCONTINUED | OUTPATIENT
Start: 2021-12-31 | End: 2022-01-01

## 2021-12-31 RX ORDER — GUAIFENESIN/DEXTROMETHORPHAN 100-10MG/5
5 SYRUP ORAL
Status: DISCONTINUED | OUTPATIENT
Start: 2021-12-31 | End: 2022-01-01

## 2021-12-31 RX ORDER — ACETAMINOPHEN 500 MG
1000 TABLET ORAL
Status: COMPLETED | OUTPATIENT
Start: 2021-12-31 | End: 2021-12-31

## 2021-12-31 RX ORDER — ACETAMINOPHEN 650 MG/1
650 SUPPOSITORY RECTAL
Status: DISCONTINUED | OUTPATIENT
Start: 2021-12-31 | End: 2022-01-04 | Stop reason: HOSPADM

## 2021-12-31 RX ORDER — DEXAMETHASONE SODIUM PHOSPHATE 100 MG/10ML
10 INJECTION INTRAMUSCULAR; INTRAVENOUS
Status: COMPLETED | OUTPATIENT
Start: 2021-12-31 | End: 2021-12-31

## 2021-12-31 RX ORDER — DEXAMETHASONE 4 MG/1
6 TABLET ORAL DAILY
Status: DISCONTINUED | OUTPATIENT
Start: 2022-01-01 | End: 2022-01-01

## 2021-12-31 RX ORDER — ENOXAPARIN SODIUM 100 MG/ML
40 INJECTION SUBCUTANEOUS DAILY
Status: DISCONTINUED | OUTPATIENT
Start: 2022-01-01 | End: 2021-12-31

## 2021-12-31 RX ORDER — SODIUM CHLORIDE 0.9 % (FLUSH) 0.9 %
5-40 SYRINGE (ML) INJECTION AS NEEDED
Status: DISCONTINUED | OUTPATIENT
Start: 2021-12-31 | End: 2022-01-04 | Stop reason: HOSPADM

## 2021-12-31 RX ORDER — ONDANSETRON 4 MG/1
4 TABLET, ORALLY DISINTEGRATING ORAL
Status: DISCONTINUED | OUTPATIENT
Start: 2021-12-31 | End: 2022-01-01

## 2021-12-31 RX ORDER — MELATONIN
2000 DAILY
Status: DISCONTINUED | OUTPATIENT
Start: 2022-01-01 | End: 2022-01-04 | Stop reason: HOSPADM

## 2021-12-31 RX ORDER — IBUPROFEN 400 MG/1
800 TABLET ORAL
Status: COMPLETED | OUTPATIENT
Start: 2021-12-31 | End: 2021-12-31

## 2021-12-31 RX ORDER — ACETAMINOPHEN 325 MG/1
650 TABLET ORAL
Status: DISCONTINUED | OUTPATIENT
Start: 2021-12-31 | End: 2022-01-04 | Stop reason: HOSPADM

## 2021-12-31 RX ORDER — POLYETHYLENE GLYCOL 3350 17 G/17G
17 POWDER, FOR SOLUTION ORAL DAILY PRN
Status: DISCONTINUED | OUTPATIENT
Start: 2021-12-31 | End: 2022-01-04 | Stop reason: HOSPADM

## 2021-12-31 RX ORDER — SODIUM CHLORIDE 0.9 % (FLUSH) 0.9 %
5-40 SYRINGE (ML) INJECTION EVERY 8 HOURS
Status: DISCONTINUED | OUTPATIENT
Start: 2021-12-31 | End: 2022-01-04 | Stop reason: HOSPADM

## 2021-12-31 RX ADMIN — IBUPROFEN 800 MG: 400 TABLET, FILM COATED ORAL at 14:07

## 2021-12-31 RX ADMIN — SODIUM CHLORIDE 1000 ML: 9 INJECTION, SOLUTION INTRAVENOUS at 14:51

## 2021-12-31 RX ADMIN — BARICITINIB 1 MG: 1 TABLET, FILM COATED ORAL at 21:15

## 2021-12-31 RX ADMIN — ACETAMINOPHEN 650 MG: 325 TABLET ORAL at 22:49

## 2021-12-31 RX ADMIN — HEPARIN SODIUM 5000 UNITS: 5000 INJECTION, SOLUTION INTRAVENOUS; SUBCUTANEOUS at 21:17

## 2021-12-31 RX ADMIN — CEFTRIAXONE SODIUM 1 G: 1 INJECTION, POWDER, FOR SOLUTION INTRAMUSCULAR; INTRAVENOUS at 15:35

## 2021-12-31 RX ADMIN — ACETAMINOPHEN 1000 MG: 500 TABLET ORAL at 14:07

## 2021-12-31 RX ADMIN — GUAIFENESIN SYRUP AND DEXTROMETHORPHAN 5 ML: 100; 10 SYRUP ORAL at 22:49

## 2021-12-31 RX ADMIN — SODIUM CHLORIDE, PRESERVATIVE FREE 10 ML: 5 INJECTION INTRAVENOUS at 21:21

## 2021-12-31 RX ADMIN — AMLODIPINE BESYLATE 10 MG: 5 TABLET ORAL at 21:14

## 2021-12-31 RX ADMIN — SODIUM CHLORIDE 100 ML/HR: 9 INJECTION, SOLUTION INTRAVENOUS at 19:17

## 2021-12-31 RX ADMIN — DEXAMETHASONE SODIUM PHOSPHATE 10 MG: 10 INJECTION INTRAMUSCULAR; INTRAVENOUS at 15:34

## 2021-12-31 RX ADMIN — AZITHROMYCIN 500 MG: 500 INJECTION, POWDER, LYOPHILIZED, FOR SOLUTION INTRAVENOUS at 15:36

## 2021-12-31 NOTE — ED NOTES
TRANSFER - OUT REPORT:    Verbal report given to [place] on Veronika Figueredo  being transferred to  for routine progression of care       Report consisted of patients Situation, Background, Assessment and   Recommendations(SBAR). Information from the following report(s) SBAR, ED Summary, [facility] and Recent Results was reviewed with the receiving nurse. Lines:   Peripheral IV 12/31/21 Right Antecubital (Active)        Opportunity for questions and clarification was provided.       Patient transported with:   Registered Nurse

## 2021-12-31 NOTE — ED TRIAGE NOTES
Patient here with c/o SOB and \" coughing up blood\" with COVID. Patient with 178/123 in triage, no meds x 1 week.

## 2021-12-31 NOTE — ED PROVIDER NOTES
EMERGENCY DEPARTMENT HISTORY AND PHYSICAL EXAM    Date: 12/31/2021  Patient Name: Lionel Duran    History of Presenting Illness     Chief Complaint   Patient presents with    Cough    Positive For Covid-19         History Provided By: Patient    HPI: Lionel Duran is a 45 y.o. male with a PMH of hypertension who presents with persistent Covid symptoms. Patient states he was diagnosed with Covid last Sunday and at that time had been having symptoms for about 3 days. Patient is not vaccinated. Patient also seen with his wife who also has Covid but she is vaccinated. Patient states that he has been taking Tylenol but not ibuprofen as he was told he should not take that with high blood pressure. Patient has also not been taking his blood pressure medication stating that he ran out of it 1 week ago. Patient states he is just been fatigued, having some shortness of breath, continued cough and no appetite due to loss of taste. PCP: None    Current Facility-Administered Medications   Medication Dose Route Frequency Provider Last Rate Last Admin    azithromycin (ZITHROMAX) 500 mg in 0.9% sodium chloride 250 mL (VIAL-MATE)  500 mg IntraVENous NOW Cassidy Soto PA-C 250 mL/hr at 12/31/21 1536 500 mg at 12/31/21 1536    0.9% sodium chloride infusion  100 mL/hr IntraVENous CONTINUOUS Lui Torres MD         Current Outpatient Medications   Medication Sig Dispense Refill    triamterene (DYRENIUM) 50 mg capsule Take 1 Cap by mouth daily. 30 Cap 3    lisinopril-hydroCHLOROthiazide (PRINZIDE, ZESTORETIC) 20-25 mg per tablet Take 1 Tab by mouth daily. 1 po daily 90 Tab 3    amLODIPine (NORVASC) 10 mg tablet Take 1 Tab by mouth daily. 90 Tab 3    cyclobenzaprine (FLEXERIL) 10 mg tablet Take 1 Tab by mouth two (2) times daily as needed for Muscle Spasm(s).  (Patient not taking: Reported on 12/26/2021) 40 Tab 1       Past History     Past Medical History:  Past Medical History:   Diagnosis Date    Hypertension        Past Surgical History:  History reviewed. No pertinent surgical history. Family History:  History reviewed. No pertinent family history. Social History:  Social History     Tobacco Use    Smoking status: Never Smoker    Smokeless tobacco: Never Used   Substance Use Topics    Alcohol use: Not Currently    Drug use: Never       Allergies:  No Known Allergies      Review of Systems   Review of Systems   Constitutional: Positive for appetite change, chills and fever. Respiratory: Positive for cough and shortness of breath. Gastrointestinal: Negative for nausea and vomiting. Musculoskeletal: Positive for myalgias. Allergic/Immunologic: Negative for immunocompromised state. Neurological: Negative for speech difficulty and weakness. All other systems reviewed and are negative. Physical Exam     Vitals:    12/31/21 1447 12/31/21 1502 12/31/21 1530 12/31/21 1531   BP: (!) 137/119 (!) 154/113     Pulse: (!) 111 (!) 106 (!) 101 99   Resp: (!) 37 30 (!) 37 (!) 35   Temp:       SpO2: 93% (!) 86% 99% 99%   Weight:       Height:         Physical Exam  Vitals and nursing note reviewed. Constitutional:       General: He is not in acute distress. Appearance: He is well-developed. HENT:      Head: Normocephalic and atraumatic. Mouth/Throat:      Pharynx: No oropharyngeal exudate. Eyes:      Conjunctiva/sclera: Conjunctivae normal.   Cardiovascular:      Rate and Rhythm: Regular rhythm. Tachycardia present. Heart sounds: Normal heart sounds. Pulmonary:      Effort: Pulmonary effort is normal. Tachypnea present. No respiratory distress. Breath sounds: Normal breath sounds. No stridor or decreased air movement. No wheezing or rales. Musculoskeletal:         General: Normal range of motion. Skin:     General: Skin is warm and dry. Neurological:      Mental Status: He is alert and oriented to person, place, and time.            Diagnostic Study Results     Labs -     Recent Results (from the past 12 hour(s))   SAMPLES BEING HELD    Collection Time: 12/31/21  2:49 PM   Result Value Ref Range    SAMPLES BEING HELD 1BLUE,1PST,1GRAY     COMMENT        Add-on orders for these samples will be processed based on acceptable specimen integrity and analyte stability, which may vary by analyte. CBC WITH AUTOMATED DIFF    Collection Time: 12/31/21  2:49 PM   Result Value Ref Range    WBC 3.5 (L) 4.1 - 11.1 K/uL    RBC 5.09 4. 10 - 5.70 M/uL    HGB 14.5 12.1 - 17.0 g/dL    HCT 44.5 36.6 - 50.3 %    MCV 87.4 80.0 - 99.0 FL    MCH 28.5 26.0 - 34.0 PG    MCHC 32.6 30.0 - 36.5 g/dL    RDW 13.2 11.5 - 14.5 %    PLATELET 582 134 - 275 K/uL    MPV 10.7 8.9 - 12.9 FL    NRBC 0.0 0  WBC    ABSOLUTE NRBC 0.00 0.00 - 0.01 K/uL    NEUTROPHILS 69 32 - 75 %    LYMPHOCYTES 15 12 - 49 %    MONOCYTES 15 (H) 5 - 13 %    EOSINOPHILS 0 0 - 7 %    BASOPHILS 0 0 - 1 %    IMMATURE GRANULOCYTES 1 (H) 0.0 - 0.5 %    ABS. NEUTROPHILS 2.5 1.8 - 8.0 K/UL    ABS. LYMPHOCYTES 0.5 (L) 0.8 - 3.5 K/UL    ABS. MONOCYTES 0.5 0.0 - 1.0 K/UL    ABS. EOSINOPHILS 0.0 0.0 - 0.4 K/UL    ABS. BASOPHILS 0.0 0.0 - 0.1 K/UL    ABS. IMM. GRANS. 0.0 0.00 - 0.04 K/UL    DF SMEAR SCANNED      RBC COMMENTS NORMOCYTIC, NORMOCHROMIC     METABOLIC PANEL, COMPREHENSIVE    Collection Time: 12/31/21  2:49 PM   Result Value Ref Range    Sodium 136 136 - 145 mmol/L    Potassium 3.3 (L) 3.5 - 5.1 mmol/L    Chloride 98 97 - 108 mmol/L    CO2 26 21 - 32 mmol/L    Anion gap 12 5 - 15 mmol/L    Glucose 113 (H) 65 - 100 mg/dL    BUN 27 (H) 6 - 20 MG/DL    Creatinine 2.44 (H) 0.70 - 1.30 MG/DL    BUN/Creatinine ratio 11 (L) 12 - 20      GFR est AA 34 (L) >60 ml/min/1.73m2    GFR est non-AA 28 (L) >60 ml/min/1.73m2    Calcium 8.4 (L) 8.5 - 10.1 MG/DL    Bilirubin, total 1.6 (H) 0.2 - 1.0 MG/DL    ALT (SGPT) 43 12 - 78 U/L    AST (SGOT) 61 (H) 15 - 37 U/L    Alk.  phosphatase 50 45 - 117 U/L    Protein, total 7.6 6.4 - 8.2 g/dL    Albumin 3.3 (L) 3.5 - 5.0 g/dL    Globulin 4.3 (H) 2.0 - 4.0 g/dL    A-G Ratio 0.8 (L) 1.1 - 2.2     EKG, 12 LEAD, INITIAL    Collection Time: 12/31/21  3:45 PM   Result Value Ref Range    Ventricular Rate 0 BPM    Atrial Rate 0 BPM    QRS Duration 0 ms    Q-T Interval 0 ms    QTC Calculation (Bezet) 0 ms    Calculated R Axis 0 degrees    Calculated T Axis 0 degrees    Diagnosis        No QRS complexes found, no ECG analysis possible   No previous ECGs available         Radiologic Studies -   XR CHEST PORT   Final Result   Airspace disease. CT Results  (Last 48 hours)    None        CXR Results  (Last 48 hours)               12/31/21 1453  XR CHEST PORT Final result    Impression:  Airspace disease. Narrative:  EXAM: Portable CXR. 1447 hours. COMPARISON: 12/26/2021. INDICATION: continued symptoms +COVID       FINDINGS:   The lungs show mild bilateral patchy haziness which may represent Covid   pneumonia and this appears slightly increased. Heart is normal in size. There is no pulmonary edema. There is no evident   pneumothorax or pleural effusion. Medical Decision Making   I am the first provider for this patient. I reviewed the vital signs, available nursing notes, past medical history, past surgical history, family history and social history. Vital Signs-Reviewed the patient's vital signs. Records Reviewed: Nursing Notes, Old Medical Records, Previous Radiology Studies and Previous Laboratory Studies    Provider Notes (Medical Decision Making):   Patient presents with Covid-like symptoms x1 week that are not improving. Will need to repeat chest x-ray and labs to evaluate for pneumonia. Patient does have history of kidney injury so if concern for PE increases will need to get VQ scan. Patient will likely need admission based on presentation and vital signs.       Disposition:  Admission to tele    Procedures:  Procedures    Please note that this dictation was completed with Dragon, computer voice recognition software. Quite often unanticipated grammatical, syntax, homophones, and other interpretive errors are inadvertently transcribed by the computer software. Please disregard these errors. Additionally, please excuse any errors that have escaped final proofreading. Diagnosis     Clinical Impression:   1. Pneumonia due to COVID-19 virus    2.  Primary hypertension

## 2021-12-31 NOTE — ED NOTES
Pt presents ambulatory to ED complaining of shortness of breath, hemoptysis r/t Covid 19. Pt tachypenic but able to speak in full sentences. Pt HR elevated with fever. Pt denies taking otc medications for fever. Pt is alert and oriented x 4, RR even, skin is warm and dry. Assesment completed and pt updated on plan of care. Emergency Department Nursing Plan of Care       The Nursing Plan of Care is developed from the Nursing assessment and Emergency Department Attending provider initial evaluation. The plan of care may be reviewed in the ED Provider note.     The Plan of Care was developed with the following considerations:   Patient / Family readiness to learn indicated by:verbalized understanding  Persons(s) to be included in education: patient  Barriers to Learning/Limitations:No    Signed     Tucker Godoy RN    12/31/2021   2:33 PM

## 2022-01-01 ENCOUNTER — APPOINTMENT (OUTPATIENT)
Dept: GENERAL RADIOLOGY | Age: 52
DRG: 720 | End: 2022-01-01
Attending: STUDENT IN AN ORGANIZED HEALTH CARE EDUCATION/TRAINING PROGRAM
Payer: MEDICAID

## 2022-01-01 ENCOUNTER — APPOINTMENT (OUTPATIENT)
Dept: NUCLEAR MEDICINE | Age: 52
DRG: 720 | End: 2022-01-01
Attending: STUDENT IN AN ORGANIZED HEALTH CARE EDUCATION/TRAINING PROGRAM
Payer: MEDICAID

## 2022-01-01 LAB
25(OH)D3 SERPL-MCNC: 10.3 NG/ML (ref 30–100)
ALBUMIN SERPL-MCNC: 3 G/DL (ref 3.5–5)
ALBUMIN SERPL-MCNC: 3 G/DL (ref 3.5–5)
ALBUMIN/GLOB SERPL: 0.7 {RATIO} (ref 1.1–2.2)
ALBUMIN/GLOB SERPL: 0.8 {RATIO} (ref 1.1–2.2)
ALP SERPL-CCNC: 50 U/L (ref 45–117)
ALP SERPL-CCNC: 52 U/L (ref 45–117)
ALT SERPL-CCNC: 42 U/L (ref 12–78)
ALT SERPL-CCNC: 44 U/L (ref 12–78)
ANION GAP SERPL CALC-SCNC: 15 MMOL/L (ref 5–15)
APTT PPP: 31.9 SEC (ref 22.1–31)
AST SERPL-CCNC: 57 U/L (ref 15–37)
AST SERPL-CCNC: 63 U/L (ref 15–37)
BASOPHILS # BLD: 0 K/UL (ref 0–0.1)
BASOPHILS NFR BLD: 0 % (ref 0–1)
BILIRUB DIRECT SERPL-MCNC: 0.5 MG/DL (ref 0–0.2)
BILIRUB SERPL-MCNC: 1.2 MG/DL (ref 0.2–1)
BILIRUB SERPL-MCNC: 1.2 MG/DL (ref 0.2–1)
BUN SERPL-MCNC: 26 MG/DL (ref 6–20)
BUN/CREAT SERPL: 13 (ref 12–20)
CALCIUM SERPL-MCNC: 7.7 MG/DL (ref 8.5–10.1)
CHLORIDE SERPL-SCNC: 100 MMOL/L (ref 97–108)
CO2 SERPL-SCNC: 24 MMOL/L (ref 21–32)
CREAT SERPL-MCNC: 1.98 MG/DL (ref 0.7–1.3)
CRP SERPL-MCNC: 6.07 MG/DL (ref 0–0.6)
D DIMER PPP FEU-MCNC: 1 MG/L FEU (ref 0–0.65)
DIFFERENTIAL METHOD BLD: ABNORMAL
EOSINOPHIL # BLD: 0 K/UL (ref 0–0.4)
EOSINOPHIL NFR BLD: 0 % (ref 0–7)
ERYTHROCYTE [DISTWIDTH] IN BLOOD BY AUTOMATED COUNT: 13.2 % (ref 11.5–14.5)
FERRITIN SERPL-MCNC: 1580 NG/ML (ref 26–388)
FIBRINOGEN PPP-MCNC: 475 MG/DL (ref 200–475)
GLOBULIN SER CALC-MCNC: 3.7 G/DL (ref 2–4)
GLOBULIN SER CALC-MCNC: 4.3 G/DL (ref 2–4)
GLUCOSE BLD STRIP.AUTO-MCNC: 129 MG/DL (ref 65–117)
GLUCOSE SERPL-MCNC: 126 MG/DL (ref 65–100)
HCT VFR BLD AUTO: 43.2 % (ref 36.6–50.3)
HGB BLD-MCNC: 14.1 G/DL (ref 12.1–17)
IMM GRANULOCYTES # BLD AUTO: 0 K/UL
IMM GRANULOCYTES NFR BLD AUTO: 0 %
INR PPP: 1 (ref 0.9–1.1)
LDH SERPL L TO P-CCNC: 432 U/L (ref 85–241)
LYMPHOCYTES # BLD: 0.6 K/UL (ref 0.8–3.5)
LYMPHOCYTES NFR BLD: 34 % (ref 12–49)
MAGNESIUM SERPL-MCNC: 1.9 MG/DL (ref 1.6–2.4)
MCH RBC QN AUTO: 28.8 PG (ref 26–34)
MCHC RBC AUTO-ENTMCNC: 32.6 G/DL (ref 30–36.5)
MCV RBC AUTO: 88.3 FL (ref 80–99)
MONOCYTES # BLD: 0.3 K/UL (ref 0–1)
MONOCYTES NFR BLD: 19 % (ref 5–13)
NEUTS SEG # BLD: 0.9 K/UL (ref 1.8–8)
NEUTS SEG NFR BLD: 47 % (ref 32–75)
NRBC # BLD: 0 K/UL (ref 0–0.01)
NRBC BLD-RTO: 0 PER 100 WBC
PHOSPHATE SERPL-MCNC: 4.2 MG/DL (ref 2.6–4.7)
PLATELET # BLD AUTO: 156 K/UL (ref 150–400)
PMV BLD AUTO: 11.1 FL (ref 8.9–12.9)
POTASSIUM SERPL-SCNC: 3.9 MMOL/L (ref 3.5–5.1)
PROCALCITONIN SERPL-MCNC: 0.08 NG/ML
PROT SERPL-MCNC: 6.7 G/DL (ref 6.4–8.2)
PROT SERPL-MCNC: 7.3 G/DL (ref 6.4–8.2)
PROTHROMBIN TIME: 10.3 SEC (ref 9–11.1)
RBC # BLD AUTO: 4.89 M/UL (ref 4.1–5.7)
RBC MORPH BLD: ABNORMAL
SERVICE CMNT-IMP: ABNORMAL
SODIUM SERPL-SCNC: 139 MMOL/L (ref 136–145)
THERAPEUTIC RANGE,PTTT: ABNORMAL SECS (ref 58–77)
WBC # BLD AUTO: 1.8 K/UL (ref 4.1–11.1)

## 2022-01-01 PROCEDURE — 2709999900 HC NON-CHARGEABLE SUPPLY

## 2022-01-01 PROCEDURE — 93005 ELECTROCARDIOGRAM TRACING: CPT

## 2022-01-01 PROCEDURE — 82728 ASSAY OF FERRITIN: CPT

## 2022-01-01 PROCEDURE — 84100 ASSAY OF PHOSPHORUS: CPT

## 2022-01-01 PROCEDURE — 85610 PROTHROMBIN TIME: CPT

## 2022-01-01 PROCEDURE — 74011250636 HC RX REV CODE- 250/636: Performed by: INTERNAL MEDICINE

## 2022-01-01 PROCEDURE — 74011250636 HC RX REV CODE- 250/636: Performed by: STUDENT IN AN ORGANIZED HEALTH CARE EDUCATION/TRAINING PROGRAM

## 2022-01-01 PROCEDURE — 82306 VITAMIN D 25 HYDROXY: CPT

## 2022-01-01 PROCEDURE — 82962 GLUCOSE BLOOD TEST: CPT

## 2022-01-01 PROCEDURE — A9540 TC99M MAA: HCPCS

## 2022-01-01 PROCEDURE — 85379 FIBRIN DEGRADATION QUANT: CPT

## 2022-01-01 PROCEDURE — 87070 CULTURE OTHR SPECIMN AEROBIC: CPT

## 2022-01-01 PROCEDURE — 84145 PROCALCITONIN (PCT): CPT

## 2022-01-01 PROCEDURE — 65270000032 HC RM SEMIPRIVATE

## 2022-01-01 PROCEDURE — 80053 COMPREHEN METABOLIC PANEL: CPT

## 2022-01-01 PROCEDURE — 71045 X-RAY EXAM CHEST 1 VIEW: CPT

## 2022-01-01 PROCEDURE — 74011250637 HC RX REV CODE- 250/637: Performed by: HOSPITALIST

## 2022-01-01 PROCEDURE — 74011250637 HC RX REV CODE- 250/637: Performed by: INTERNAL MEDICINE

## 2022-01-01 PROCEDURE — 85025 COMPLETE CBC W/AUTO DIFF WBC: CPT

## 2022-01-01 PROCEDURE — 74011250637 HC RX REV CODE- 250/637: Performed by: STUDENT IN AN ORGANIZED HEALTH CARE EDUCATION/TRAINING PROGRAM

## 2022-01-01 PROCEDURE — 83615 LACTATE (LD) (LDH) ENZYME: CPT

## 2022-01-01 PROCEDURE — 85730 THROMBOPLASTIN TIME PARTIAL: CPT

## 2022-01-01 PROCEDURE — 80076 HEPATIC FUNCTION PANEL: CPT

## 2022-01-01 PROCEDURE — 83735 ASSAY OF MAGNESIUM: CPT

## 2022-01-01 PROCEDURE — 85384 FIBRINOGEN ACTIVITY: CPT

## 2022-01-01 PROCEDURE — 36415 COLL VENOUS BLD VENIPUNCTURE: CPT

## 2022-01-01 PROCEDURE — 86140 C-REACTIVE PROTEIN: CPT

## 2022-01-01 PROCEDURE — 77010033678 HC OXYGEN DAILY

## 2022-01-01 RX ORDER — ALBUTEROL SULFATE 90 UG/1
2 AEROSOL, METERED RESPIRATORY (INHALATION)
Status: DISCONTINUED | OUTPATIENT
Start: 2022-01-01 | End: 2022-01-01

## 2022-01-01 RX ORDER — LANOLIN ALCOHOL/MO/W.PET/CERES
3 CREAM (GRAM) TOPICAL
Status: DISCONTINUED | OUTPATIENT
Start: 2022-01-01 | End: 2022-01-03

## 2022-01-01 RX ORDER — GUAIFENESIN/DEXTROMETHORPHAN 100-10MG/5
10 SYRUP ORAL
Status: DISCONTINUED | OUTPATIENT
Start: 2022-01-01 | End: 2022-01-04 | Stop reason: HOSPADM

## 2022-01-01 RX ORDER — CLONIDINE HYDROCHLORIDE 0.1 MG/1
0.1 TABLET ORAL
Status: DISCONTINUED | OUTPATIENT
Start: 2022-01-01 | End: 2022-01-04 | Stop reason: HOSPADM

## 2022-01-01 RX ORDER — CARVEDILOL 3.12 MG/1
6.25 TABLET ORAL 2 TIMES DAILY WITH MEALS
Status: DISCONTINUED | OUTPATIENT
Start: 2022-01-01 | End: 2022-01-04 | Stop reason: HOSPADM

## 2022-01-01 RX ORDER — BENZONATATE 100 MG/1
100 CAPSULE ORAL
Status: DISCONTINUED | OUTPATIENT
Start: 2022-01-01 | End: 2022-01-04 | Stop reason: HOSPADM

## 2022-01-01 RX ORDER — DEXAMETHASONE 4 MG/1
10 TABLET ORAL DAILY
Status: DISCONTINUED | OUTPATIENT
Start: 2022-01-06 | End: 2022-01-01

## 2022-01-01 RX ORDER — ENOXAPARIN SODIUM 100 MG/ML
40 INJECTION SUBCUTANEOUS EVERY 12 HOURS
Status: DISCONTINUED | OUTPATIENT
Start: 2022-01-01 | End: 2022-01-01

## 2022-01-01 RX ORDER — DEXAMETHASONE 4 MG/1
20 TABLET ORAL DAILY
Status: DISCONTINUED | OUTPATIENT
Start: 2022-01-01 | End: 2022-01-01

## 2022-01-01 RX ORDER — LANOLIN ALCOHOL/MO/W.PET/CERES
100 CREAM (GRAM) TOPICAL DAILY
Status: DISCONTINUED | OUTPATIENT
Start: 2022-01-01 | End: 2022-01-04 | Stop reason: HOSPADM

## 2022-01-01 RX ORDER — ENOXAPARIN SODIUM 100 MG/ML
40 INJECTION SUBCUTANEOUS DAILY
Status: DISCONTINUED | OUTPATIENT
Start: 2022-01-02 | End: 2022-01-04

## 2022-01-01 RX ORDER — DEXAMETHASONE 4 MG/1
6 TABLET ORAL DAILY
Status: DISCONTINUED | OUTPATIENT
Start: 2022-01-02 | End: 2022-01-04 | Stop reason: HOSPADM

## 2022-01-01 RX ORDER — CARVEDILOL 3.12 MG/1
6.25 TABLET ORAL
Status: COMPLETED | OUTPATIENT
Start: 2022-01-01 | End: 2022-01-01

## 2022-01-01 RX ORDER — SODIUM CHLORIDE, SODIUM LACTATE, POTASSIUM CHLORIDE, CALCIUM CHLORIDE 600; 310; 30; 20 MG/100ML; MG/100ML; MG/100ML; MG/100ML
50 INJECTION, SOLUTION INTRAVENOUS CONTINUOUS
Status: DISCONTINUED | OUTPATIENT
Start: 2022-01-01 | End: 2022-01-01

## 2022-01-01 RX ORDER — FUROSEMIDE 10 MG/ML
40 INJECTION INTRAMUSCULAR; INTRAVENOUS ONCE
Status: COMPLETED | OUTPATIENT
Start: 2022-01-01 | End: 2022-01-01

## 2022-01-01 RX ORDER — FAMOTIDINE 20 MG/1
20 TABLET, FILM COATED ORAL 2 TIMES DAILY
Status: DISCONTINUED | OUTPATIENT
Start: 2022-01-01 | End: 2022-01-04

## 2022-01-01 RX ORDER — ALBUTEROL SULFATE 90 UG/1
2 AEROSOL, METERED RESPIRATORY (INHALATION)
Status: DISCONTINUED | OUTPATIENT
Start: 2022-01-02 | End: 2022-01-04 | Stop reason: HOSPADM

## 2022-01-01 RX ADMIN — HEPARIN SODIUM 5000 UNITS: 5000 INJECTION, SOLUTION INTRAVENOUS; SUBCUTANEOUS at 15:00

## 2022-01-01 RX ADMIN — GUAIFENESIN SYRUP AND DEXTROMETHORPHAN 5 ML: 100; 10 SYRUP ORAL at 10:40

## 2022-01-01 RX ADMIN — SODIUM CHLORIDE 100 ML/HR: 9 INJECTION, SOLUTION INTRAVENOUS at 00:21

## 2022-01-01 RX ADMIN — BENZONATATE 100 MG: 100 CAPSULE ORAL at 11:36

## 2022-01-01 RX ADMIN — ACETAMINOPHEN 650 MG: 325 TABLET ORAL at 20:59

## 2022-01-01 RX ADMIN — GUAIFENESIN SYRUP AND DEXTROMETHORPHAN 5 ML: 100; 10 SYRUP ORAL at 15:00

## 2022-01-01 RX ADMIN — ACETAMINOPHEN 650 MG: 325 TABLET ORAL at 05:07

## 2022-01-01 RX ADMIN — SODIUM CHLORIDE, PRESERVATIVE FREE 10 ML: 5 INJECTION INTRAVENOUS at 05:07

## 2022-01-01 RX ADMIN — GUAIFENESIN SYRUP AND DEXTROMETHORPHAN 5 ML: 100; 10 SYRUP ORAL at 21:53

## 2022-01-01 RX ADMIN — SODIUM CHLORIDE, PRESERVATIVE FREE 10 ML: 5 INJECTION INTRAVENOUS at 15:01

## 2022-01-01 RX ADMIN — SODIUM CHLORIDE, POTASSIUM CHLORIDE, SODIUM LACTATE AND CALCIUM CHLORIDE 50 ML/HR: 600; 310; 30; 20 INJECTION, SOLUTION INTRAVENOUS at 15:02

## 2022-01-01 RX ADMIN — BARICITINIB 2 MG: 2 TABLET, FILM COATED ORAL at 20:39

## 2022-01-01 RX ADMIN — GUAIFENESIN SYRUP AND DEXTROMETHORPHAN 5 ML: 100; 10 SYRUP ORAL at 20:59

## 2022-01-01 RX ADMIN — ZINC SULFATE 220 MG (50 MG) CAPSULE 1 CAPSULE: CAPSULE at 08:46

## 2022-01-01 RX ADMIN — FUROSEMIDE 40 MG: 10 INJECTION, SOLUTION INTRAVENOUS at 21:52

## 2022-01-01 RX ADMIN — CARVEDILOL 6.25 MG: 3.12 TABLET, FILM COATED ORAL at 23:19

## 2022-01-01 RX ADMIN — FAMOTIDINE 20 MG: 20 TABLET ORAL at 21:52

## 2022-01-01 RX ADMIN — SODIUM CHLORIDE, PRESERVATIVE FREE 10 ML: 5 INJECTION INTRAVENOUS at 21:53

## 2022-01-01 RX ADMIN — DEXAMETHASONE 6 MG: 4 TABLET ORAL at 08:46

## 2022-01-01 RX ADMIN — HEPARIN SODIUM 5000 UNITS: 5000 INJECTION, SOLUTION INTRAVENOUS; SUBCUTANEOUS at 05:07

## 2022-01-01 RX ADMIN — OXYCODONE HYDROCHLORIDE AND ACETAMINOPHEN 500 MG: 500 TABLET ORAL at 18:56

## 2022-01-01 RX ADMIN — Medication 2000 UNITS: at 08:46

## 2022-01-01 RX ADMIN — BENZONATATE 100 MG: 100 CAPSULE ORAL at 21:00

## 2022-01-01 RX ADMIN — ALBUTEROL SULFATE 2 PUFF: 90 AEROSOL, METERED RESPIRATORY (INHALATION) at 20:39

## 2022-01-01 RX ADMIN — Medication 3 MG: at 21:51

## 2022-01-01 RX ADMIN — OXYCODONE HYDROCHLORIDE AND ACETAMINOPHEN 500 MG: 500 TABLET ORAL at 08:46

## 2022-01-01 RX ADMIN — CARVEDILOL 6.25 MG: 3.12 TABLET, FILM COATED ORAL at 11:36

## 2022-01-01 RX ADMIN — Medication 100 MG: at 21:52

## 2022-01-01 RX ADMIN — HEPARIN SODIUM 5000 UNITS: 5000 INJECTION, SOLUTION INTRAVENOUS; SUBCUTANEOUS at 21:00

## 2022-01-01 NOTE — PROGRESS NOTES
Cardiac Monitoring on 1.1.22  from 0700 - 1900    Tele Strip on 1.1.22 at 0700 - first degree block with elevated QTC (538)              Tele Strip on 1.1.22 at 1500 - First degree block/Sinus Tach/ST depression

## 2022-01-01 NOTE — PROGRESS NOTES
[facility] Admission Pharmacy Medication Reconciliation    Information obtained from:   Patient interview, RX Query, [place]   RxQuery data available1: no record since July 2021    Comments/recommendations:    1) The patient was interviewed regarding current PTA medication list and drug allergies. Current PTA med list as is below. Patient stated he took last doses a week ago. (Patient stated he takes OTC ibuprofen or acetaminophen occasionally for pain.)    2) Medication changes to PTA list:    Added   none  Removed   Cyclobenzaprine 10 mg  Adjusted   none    3) The [place] Prescription Monitoring Program () was accessed to determine fill history of any controlled medications:   No record of controlled medications dispensed in past 2 years       1RxQuery pharmacy benefit data reflects medications filled and processed through the patient's insurance, however                this data does NOT capture whether the medication was picked up or is currently being taken by the patient. Past Medical History/Disease States:  Past Medical History:   Diagnosis Date    Hypertension          Patient allergies: Allergies as of 12/31/2021    (No Known Allergies)         Prior to Admission Medications   Prescriptions Last Dose Informant  Taking? amLODIPine (NORVASC) 10 mg tablet 12/24/2021 at Unknown time   Yes   Sig: Take 1 Tab by mouth daily. lisinopril-hydroCHLOROthiazide (PRINZIDE, ZESTORETIC) 20-25 mg per tablet 12/24/2021 at Unknown time   Yes   Sig: Take 1 Tab by mouth daily. 1 po daily   triamterene (DYRENIUM) 50 mg capsule 12/24/2021 at Unknown time   Yes   Sig: Take 1 Cap by mouth daily.                 Thank you,  Patrick Alberto, [facility]

## 2022-01-01 NOTE — ACP (ADVANCE CARE PLANNING)
Current Advanced Directive/Advance Care Plan: Full Code  Patient wants CPR/ventilation.    Healthcare decision maker is his wife,  Carissa Velarde (Spouse)   [phone] [facility]

## 2022-01-01 NOTE — PROGRESS NOTES
Problem: Airway Clearance - Ineffective  Goal: Achieve or maintain patent airway  Outcome: Progressing Towards Goal     Problem: Breathing Pattern - Ineffective  Goal: Ability to achieve and maintain a regular respiratory rate  Outcome: Progressing Towards Goal     Problem:  Body Temperature -  Risk of, Imbalanced  Goal: Ability to maintain a body temperature within defined limits  Outcome: Progressing Towards Goal  Goal: Will regain or maintain usual level of consciousness  Outcome: Progressing Towards Goal  Goal: Complications related to the disease process, condition or treatment will be avoided or minimized  Outcome: Progressing Towards Goal     Problem: Isolation Precautions - Risk of Spread of Infection  Goal: Prevent transmission of infectious organism to others  Outcome: Progressing Towards Goal     Problem: Nutrition Deficits  Goal: Optimize nutrtional status  Outcome: Progressing Towards Goal     Problem: Risk for Fluid Volume Deficit  Goal: Maintain normal heart rhythm  Outcome: Progressing Towards Goal  Goal: Maintain absence of muscle cramping  Outcome: Progressing Towards Goal  Goal: Maintain normal serum potassium, sodium, calcium, phosphorus, and pH  Outcome: Progressing Towards Goal     Problem: Loneliness or Risk for Loneliness  Goal: Demonstrate positive use of time alone when socialization is not possible  Outcome: Progressing Towards Goal     Problem: Fatigue  Goal: Verbalize increase energy and improved vitality  Outcome: Progressing Towards Goal     Problem: Patient Education: Go to Patient Education Activity  Goal: Patient/Family Education  Outcome: Progressing Towards Goal

## 2022-01-01 NOTE — PROGRESS NOTES
Hospitalist Progress Note    NAME: Honey Kim   :  1970   MRN:  [id number]   Room Number:  026/91  @ [facility]       Interim Hospital Summary: 45 y.o. male whom presented on 2021 with      Assessment / Plan:      #Acute hypoxic respiratory failure requiring supplemental oxygen POA,  #COVID-19 pneumonia POA  -Chest x-ray reviewed independently with airspace disease  -Covid PCR positive  -Requiring 4 L supplemental oxygen  -Pro-Mckinley 0.12  -CRP 5.82, ferritin 1392, ,  -VQ scan without evidence of pulmonary embolism    -Continue supplemental oxygen to keep above 94  -Dexamethasone and baricitinib  -Albuterol as needed  -Tylenol as needed  -Antitussive as needed  -Vitamin C and zinc    #Lymphopenia  -ANC 0.9 and ALC 0.6  -Continue monitor while on baricitinib      #RUTH on CKD 3B resolved  -On admission serum creatinine 2.44 > 1.98 (baseline 1.70)   -LR     #Hypertension  -Hold for now    Code status: Full  Prophylaxis: Lovenox  Recommended Disposition: Home w/Family     Subjective:     Chief Complaint / Reason for Physician Visit  Patient has increased oxygen requirements today from yesterday. Endorsed cough and tachypnea discussed with RN events overnight. Review of Systems:  No fevers, chills, appetite change, c, sputum production, ,nausea, vomitting, diarrhea, constipation, chest pain, leg edema, abdominal pain, joint pain, rash, itching. Tolerating PT/OT. Tolerating diet. Objective:     VITALS:   Last 24hrs VS reviewed since prior progress note.  Most recent are:  Patient Vitals for the past 24 hrs:   Temp Pulse Resp BP SpO2   22 0734 97.1 °F (36.2 °C) 83 28 (!) 165/107 90 %   22 0701 -- 89 -- -- --   22 0435 -- 98 -- -- 95 %   22 0400 -- 98 -- -- --   22 0314 -- -- -- -- 91 %   22 0256 97 °F (36.1 °C) 87 28 (!) 156/111 (!) 88 %   22 0016 97.7 °F (36.5 °C) 87 24 (!) 150/107 93 %   22 0000 -- 87 -- -- -- 12/31/21 2000 -- 99 -- -- --   12/31/21 1902 -- -- -- -- 93 %   12/31/21 1843 98.7 °F (37.1 °C) 99 22 (!) 159/110 93 %   12/31/21 1800 -- (!) 101 (!) 31 (!) 155/110 91 %   12/31/21 1730 -- (!) 103 28 (!) 141/91 95 %   12/31/21 1700 -- 98 (!) 37 (!) 145/107 93 %   12/31/21 1634 -- 100 (!) 41 (!) 139/100 96 %   12/31/21 1604 -- (!) 103 (!) 32 (!) 144/94 94 %   12/31/21 1534 -- (!) 101 (!) 36 (!) 150/96 99 %   12/31/21 1531 -- 99 (!) 35 -- 99 %   12/31/21 1530 -- (!) 101 (!) 37 -- 99 %   12/31/21 1502 -- (!) 106 30 (!) 154/113 (!) 86 %   12/31/21 1447 -- (!) 111 (!) 37 (!) 137/119 93 %   12/31/21 1432 -- (!) 115 (!) 31 (!) 167/113 95 %   12/31/21 1349 (!) 101.4 °F (38.6 °C) (!) 122 30 (!) 178/123 94 %       Intake/Output Summary (Last 24 hours) at 1/1/2022 0925  Last data filed at 1/1/2022 9619  Gross per 24 hour   Intake 2678.34 ml   Output 975 ml   Net 1703.34 ml        PHYSICAL EXAM:  General: WD, WN. Alert, cooperative, no acute distress    EENT:  EOMI. Anicteric sclerae. MMM  Resp:  CTA bilaterally, no wheezing or rales. No accessory muscle use  CV:  Regular  rhythm,  normal S1/S2, no murmurs rubs gallops, No edema  GI:  Soft, Non distended, Non tender. +Bowel sounds  Neurologic:  Alert and oriented X 3, normal speech,   Psych:   Good insight. Not anxious nor agitated  Skin:  No rashes. No jaundice    Reviewed most current lab test results and cultures  YES  Reviewed most current radiology test results   YES  Review and summation of old records today    NO  Reviewed patient's current orders and MAR    YES  PMH/SH reviewed - no change compared to H&P  ________________________________________________________________________  Care Plan discussed with:    Comments   Patient x    Family      RN x    Care Manager     Consultant                        Multidiciplinary team rounds were held today with , nursing, pharmacist and clinical coordinator.   Patient's plan of care was discussed; medications were reviewed and discharge planning was addressed. ________________________________________________________________________  Total NON critical care TIME:  45  Minutes    Total CRITICAL CARE TIME Spent:   Minutes non procedure based      Comments   >50% of visit spent in counseling and coordination of care x    ________________________________________________________________________  Chaya Fitch MD     Procedures: see electronic medical records for all procedures/Xrays and details which were not copied into this note but were reviewed prior to creation of Plan. LABS:  I reviewed today's most current labs and imaging studies.   Pertinent labs include:  Recent Labs     01/01/22  0251 12/31/21  1449   WBC 1.8* 3.5*   HGB 14.1 14.5   HCT 43.2 44.5    152     Recent Labs     01/01/22 0251 12/31/21  1449    136   K 3.9 3.3*    98   CO2 24 26   * 113*   BUN 26* 27*   CREA 1.98* 2.44*   CA 7.7* 8.4*   MG 1.9  --    PHOS 4.2  --    ALB 3.0*  3.0* 3.3*   TBILI 1.2*  1.2* 1.6*   ALT 44  42 43   INR 1.0 1.1       Signed: Chaya Fitch MD

## 2022-01-01 NOTE — H&P
Hospitalist Admission Note    NAME: Bakari Barnhart   :  1970   MRN:  [id number]   Room Number: 715/66  @ [facility]     Date/Time:  2021 7:41 PM    Patient PCP: None  ______________________________________________________________________  Given the patient's current clinical presentation, I have a high level of concern for decompensation if discharged from the emergency department. Complex decision making was performed, which includes reviewing the patient's available past medical records, laboratory results, and x-ray films. My assessment of this patient's clinical condition and my plan of care is as follows. Assessment / Plan: Active Problems:    COVID (2021)    COVID Pneumonia POA causing   Acute respiratory failure with hypoxia POA &   Sepsis POA  Hemoptysis POA  COVID PCR  positive. CXR interpreted independently - bilateral patchy opacitiis bilaterally. Elevated d-dimer, ferritin, CRP.     - Dexamethasone D1  - Droplet plus   - Pharmacy consult for barcitinib, meets criteria. - Ascorbic acid, zinc  - urine legionella,strep, resp culture, procalcitonin  - trend inflammatory markers  - prone positioning  - need to rule out PE due to hemoptysis, hypoxia, hypercoagulable state. Cannot do CTA due to RUTH. V/Q scan ordered. Consider full dose AC. Acute renal failure POA on CKD III   Baseline Cr 1.7. Suspect CKD due to long standing hypertension. RUTH likely pre-renal due to hypovolemia, may have component of ATN due to COVID    - Strict Is and Os, avoid nephrotoxic meds, renally dose meds,   - US renal  - Check urine lytes      Uncontrolled hypertension POA  Hold triamterene, lisinopril, HCTZ  - Resume amlodipine     Body mass index is 35.04 kg/m². Morbid obesity POA  Counseled on Lifestyle modifications, AHA Diet ,weight loss strategies.     Code Status: full   Surrogate Decision Maker:  wife    DVT Prophylaxis: Lovenox  GI Prophylaxis: not indicated  Baseline: ambulatory independently         Subjective:   CHIEF COMPLAINT: hemoptysis    HISTORY OF PRESENT ILLNESS:     Irais Sender is a 45 y.o.  male with PMH of HTN, CKD who presents to ED with c/o hemoptysis. Patient has had symptoms of generalized body aches, fatigue, malaise, myalgias, chills, shortness of breath, nonproductive cough, sore throat for the past 8 days. He was evaluated in [facility] emergency room on 12/26 and discharged home. Covid PCR positive on 12/26. Patient does not vaccinated for Covid. Patient symptoms have progressively become worse since then. He has been taking Tylenol to help with body aches. Has not used any NSAIDs. Has not taken blood pressure medications for the past week as he has run out of them. Reports loss of appetite and poor oral intake due to loss of sense of taste or smell. We were asked to admit for work up and evaluation of the above problems. Past Medical History:   Diagnosis Date    Hypertension         History reviewed. No pertinent surgical history. Social History     Tobacco Use    Smoking status: Never Smoker    Smokeless tobacco: Never Used   Substance Use Topics    Alcohol use: Not Currently        History reviewed. No pertinent family history. No Known Allergies     Prior to Admission medications    Medication Sig Start Date End Date Taking? Authorizing Provider   triamterene (DYRENIUM) 50 mg capsule Take 1 Cap by mouth daily. 4/16/21  Yes Stacy Hill PA   lisinopril-hydroCHLOROthiazide (PRINZIDE, ZESTORETIC) 20-25 mg per tablet Take 1 Tab by mouth daily. 1 po daily 3/8/21  Yes Bryce Dubin, MD   amLODIPine (NORVASC) 10 mg tablet Take 1 Tab by mouth daily. 3/8/21  Yes Bryce Dubin, MD       REVIEW OF SYSTEMS:     I am not able to complete the review of systems because:    The patient is intubated and sedated    The patient has altered mental status due to his acute medical problems    The patient has baseline aphasia from prior stroke(s)    The patient has baseline dementia and is not reliable historian    The patient is in acute medical distress and unable to provide information           Total of 12 systems reviewed as follows:       POSITIVE= underlined text  Negative = text not underlined  General:  fever, chills, sweats, generalized weakness, weight loss/gain,      loss of appetite   Eyes:    blurred vision, eye pain, loss of vision, double vision  ENT:    rhinorrhea, pharyngitis   Respiratory:   cough, sputum production, SOB, CORTES, wheezing, pleuritic pain   Cardiology:   chest pain, palpitations, orthopnea, PND, edema, syncope   Gastrointestinal:  abdominal pain , N/V, diarrhea, dysphagia, constipation, bleeding   Genitourinary:  frequency, urgency, dysuria, hematuria, incontinence   Muskuloskeletal :  arthralgia, myalgia, back pain  Hematology:  easy bruising, nose or gum bleeding, lymphadenopathy   Dermatological: rash, ulceration, pruritis, color change / jaundice  Endocrine:   hot flashes or polydipsia   Neurological:  headache, dizziness, confusion, focal weakness, paresthesia,     Speech difficulties, memory loss, gait difficulty  Psychological: Feelings of anxiety, depression, agitation    Objective:   VITALS:    Visit Vitals  BP (!) 159/110 (BP 1 Location: Left upper arm, BP Patient Position: At rest)   Pulse 99   Temp 98.7 °F (37.1 °C)   Resp 22   Ht 5' 10\" (1.778 m)   Wt 110.8 kg (244 lb 3.2 oz)   SpO2 93%   BMI 35.04 kg/m²       PHYSICAL EXAM:    General:    Alert, cooperative, no distress, appears stated age. HEENT: Atraumatic, anicteric sclerae, pink conjunctivae     No oral ulcers, mucosa moist, throat clear, dentition fair  Neck:  Supple, symmetrical,  thyroid: non tender  Lungs:   Bibasilar crackles  Chest wall:  No tenderness  No Accessory muscle use. Heart:   Regular  rhythm,  No  murmur   No edema  Abdomen:   Soft, non-tender. Not distended.   Bowel sounds normal  Extremities: No cyanosis. No clubbing,      Skin turgor normal, Capillary refill normal, Radial dial pulse 2+  Skin:     Not pale. Not Jaundiced  No rashes   Psych:  Good insight. Not depressed. Not anxious or agitated. Neurologic: EOMs intact. No facial asymmetry. No aphasia or slurred speech. Symmetrical strength, Sensation grossly intact. Alert and oriented X 4.     ______________________________________________________________________    Care Plan discussed with:  Patient/Family and Nurse    Expected  Disposition:  Home w/Family  ________________________________________________________________________  TOTAL TIME:  28 Minutes    Critical Care Provided     Minutes non procedure based      Comments    x Reviewed previous records   >50% of visit spent in counseling and coordination of care x Discussion with patient and/or family and questions answered       ________________________________________________________________________  Signed: Maya Lopez MD    Procedures: see electronic medical records for all procedures/Xrays and details which were not copied into this note but were reviewed prior to creation of Plan. LAB DATA REVIEWED:    Recent Results (from the past 24 hour(s))   SAMPLES BEING HELD    Collection Time: 12/31/21  2:49 PM   Result Value Ref Range    SAMPLES BEING HELD 1BLUE,1PST,1GRAY     COMMENT        Add-on orders for these samples will be processed based on acceptable specimen integrity and analyte stability, which may vary by analyte. CBC WITH AUTOMATED DIFF    Collection Time: 12/31/21  2:49 PM   Result Value Ref Range    WBC 3.5 (L) 4.1 - 11.1 K/uL    RBC 5.09 4. 10 - 5.70 M/uL    HGB 14.5 12.1 - 17.0 g/dL    HCT 44.5 36.6 - 50.3 %    MCV 87.4 80.0 - 99.0 FL    MCH 28.5 26.0 - 34.0 PG    MCHC 32.6 30.0 - 36.5 g/dL    RDW 13.2 11.5 - 14.5 %    PLATELET 539 221 - 636 K/uL    MPV 10.7 8.9 - 12.9 FL    NRBC 0.0 0  WBC    ABSOLUTE NRBC 0.00 0.00 - 0.01 K/uL    NEUTROPHILS 69 32 - 75 %    LYMPHOCYTES 15 12 - 49 %    MONOCYTES 15 (H) 5 - 13 %    EOSINOPHILS 0 0 - 7 %    BASOPHILS 0 0 - 1 %    IMMATURE GRANULOCYTES 1 (H) 0.0 - 0.5 %    ABS. NEUTROPHILS 2.5 1.8 - 8.0 K/UL    ABS. LYMPHOCYTES 0.5 (L) 0.8 - 3.5 K/UL    ABS. MONOCYTES 0.5 0.0 - 1.0 K/UL    ABS. EOSINOPHILS 0.0 0.0 - 0.4 K/UL    ABS. BASOPHILS 0.0 0.0 - 0.1 K/UL    ABS. IMM. GRANS. 0.0 0.00 - 0.04 K/UL    DF SMEAR SCANNED      RBC COMMENTS NORMOCYTIC, NORMOCHROMIC     METABOLIC PANEL, COMPREHENSIVE    Collection Time: 12/31/21  2:49 PM   Result Value Ref Range    Sodium 136 136 - 145 mmol/L    Potassium 3.3 (L) 3.5 - 5.1 mmol/L    Chloride 98 97 - 108 mmol/L    CO2 26 21 - 32 mmol/L    Anion gap 12 5 - 15 mmol/L    Glucose 113 (H) 65 - 100 mg/dL    BUN 27 (H) 6 - 20 MG/DL    Creatinine 2.44 (H) 0.70 - 1.30 MG/DL    BUN/Creatinine ratio 11 (L) 12 - 20      GFR est AA 34 (L) >60 ml/min/1.73m2    GFR est non-AA 28 (L) >60 ml/min/1.73m2    Calcium 8.4 (L) 8.5 - 10.1 MG/DL    Bilirubin, total 1.6 (H) 0.2 - 1.0 MG/DL    ALT (SGPT) 43 12 - 78 U/L    AST (SGOT) 61 (H) 15 - 37 U/L    Alk.  phosphatase 50 45 - 117 U/L    Protein, total 7.6 6.4 - 8.2 g/dL    Albumin 3.3 (L) 3.5 - 5.0 g/dL    Globulin 4.3 (H) 2.0 - 4.0 g/dL    A-G Ratio 0.8 (L) 1.1 - 2.2     D DIMER    Collection Time: 12/31/21  2:49 PM   Result Value Ref Range    D-dimer 1.15 (H) 0.00 - 0.65 mg/L FEU   C REACTIVE PROTEIN, QT    Collection Time: 12/31/21  2:49 PM   Result Value Ref Range    C-Reactive protein 5.82 (H) 0.00 - 0.60 mg/dL   PROTHROMBIN TIME + INR    Collection Time: 12/31/21  2:49 PM   Result Value Ref Range    INR 1.1 0.9 - 1.1      Prothrombin time 10.5 9.0 - 11.1 sec   PTT    Collection Time: 12/31/21  2:49 PM   Result Value Ref Range    aPTT 30.7 22.1 - 31.0 sec    aPTT, therapeutic range     58.0 - 77.0 SECS   LD    Collection Time: 12/31/21  2:49 PM   Result Value Ref Range     (H) 85 - 241 U/L   EKG, 12 LEAD, INITIAL    Collection Time: 12/31/21  3:45 PM   Result Value Ref Range    Ventricular Rate 0 BPM    Atrial Rate 0 BPM    QRS Duration 0 ms    Q-T Interval 0 ms    QTC Calculation (Bezet) 0 ms    Calculated R Axis 0 degrees    Calculated T Axis 0 degrees    Diagnosis       ** No QRS complexes found, no ECG analysis possible **  No previous ECGs available     FERRITIN    Collection Time: 12/31/21  4:10 PM   Result Value Ref Range    Ferritin 1,392 (H) 26 - 388 NG/ML   POC LACTIC ACID    Collection Time: 12/31/21  5:39 PM   Result Value Ref Range    Lactic Acid (POC) 0.90 0.40 - 2.00 mmol/L

## 2022-01-01 NOTE — PROGRESS NOTES
[facility] Dosing Services:     Pharmacist Renal Dosing Progress Note for baricitinib    Physician Gauri     The following medication: baricitinib was automatically dose-adjusted per [facility] P&T Committee Protocol, with respect to renal function. Consult provided for this   45 y.o. , male , for the indication of COVID treatment     Pt Weight:   Wt Readings from Last 1 Encounters:   01/01/22 112.1 kg (247 lb 1.6 oz)         Previous Regimen Baricitinib 1 mg po daily    Serum Creatinine Lab Results   Component Value Date/Time    Creatinine 1.98 (H) 01/01/2022 02:51 AM       Creatinine Clearance Estimated Creatinine Clearance: 54.7 mL/min (A) (based on SCr of 1.98 mg/dL (H)). BUN Lab Results   Component Value Date/Time    BUN 26 (H) 01/01/2022 02:51 AM       Dosage changed to:  baricitinib 2 mg po daily for eGFR 30-60 (currently 43)          Pharmacy to continue to monitor patient daily. Will make dosage adjustments based upon changing renal function.   Signed ADAM RojasD. Contact information: [phone]

## 2022-01-01 NOTE — PROGRESS NOTES
[facility] Baricitinib Monitoring    Day 1:  Age greater than 2 years? YES  Confirmed COVID+? YES  Requiring supplemental oxygen? YES 3L  ANY elevation in inflammatory markers (CRP 5.82, D-dimer 1.15, , or ferritin 1392)? YES  Concomitant dexamethasone 6 mg daily (or equivalent steroid)? YES   VTE prophylaxis (unless contraindicated)? YES  Dose appropriate for renal function (must use eGFR, not CrCl)? YES   Stop date of 14 days (or until hospital discharge, whichever is sooner)? YES    Special Considerations (must review risk/benefit with prescriber):  Pregnancy  Severe hepatic impairment  Chronic/recurrent infections  Hgb < 8  Chronically immunosuppressed (drug or disease etiology)    CONTRAindications:  Contraindicated if patient received tocilizumab in prior 16 days    Drug Interactions:  Strong OAT3 inhibitors (probenecid, benzylpenicillin) require dose adjustments    Lab Monitoring:  ANC >/= 500 cells/mcL? YES  If no, consider interruption until > 500 cells/mcL  ALC (absolute lymphocyte count) >/=200 cells/mcL? YES  If no, consider interruption until > 200 cells/mcL  Elevated AST or ALT? ALT 43; AST slightly elevated 61  - will monitor trend  If drug-induced liver injury is suspected, interrupt therapy until drug-induced liver injury is excluded    Does patient meet criteria for baricitinib? YES          Updated 11/23/21 based on Darrin 26 19 Treatment Summary - Hub Edition 10/22/21 update.

## 2022-01-01 NOTE — PROGRESS NOTES
End of shift note: Patient recently reported abdominal right sided pain from \"coughing\", Tylenol given and Robitussin. Patient reports feeling hot and sweaty, temp 96.9, CHG bath done at this time. Patient resting in bed on 3 liters of o2, reminded patient to drink fluids.

## 2022-01-01 NOTE — PROGRESS NOTES
1157 Patient transported to NM for VQ scan, in wheelchair on portable O2.     1250 - Patient arrived back to room in wheelchair on portable O2. Patient request that linens be changed. Nurse changed linens. 1301 - While preparing to transfer patient to bed, patient reports feeling lightheaded and dizzy, patients coloring is poor, Patient O2 saturation 83% on 4L NC. BP 96/64, repeat 100/76, 107/84 Patient stated he felt like he was going to pass out. Patient switched to non-rebreather mask at 15L, O2 sat increase to 95% then to 98%. Patient transferred from wheelchair to bed on his own strength. EKG completed and transmitted. 1312 - -Order to transfer to higher level of care    1313- Patient transitioned back to NC at 4L maintaining O2 at 95%.      1320 - Transfer canceled

## 2022-01-01 NOTE — PROGRESS NOTES
Reason for Admission:  HISTORY OF PRESENT ILLNESS:     Sailaja Espinoza is a 45 y.o.  male with PMH of HTN, CKD who presents to ED with c/o hemoptysis. Patient has had symptoms of generalized body aches, fatigue, malaise, myalgias, chills, shortness of breath, nonproductive cough, sore throat for the past 8 days. RUR Score: 9%                    Plan for utilizing home health:      TBD    PCP: First and Last name:     Name of Practice: [facility] ([address]   Are you a current patient: Yes/No: Y   Approximate date of last visit: Last year   Can you participate in a virtual visit with your PCP:                     Current Advanced Directive/Advance Care Plan: Full Code  Patient wants CPR/ventilation. Healthcare decision maker is his wife,  Juvenal Gautam (Spouse)   [phone] (Mobile)    Healthcare Decision Maker:   Click here to complete [address] including selection of the Healthcare Decision Maker Relationship (ie \"Primary\")  Wants CPR/ventilation. Healthcare decision maker is his wife,   Juvenal Gautam (Spouse)   [phone] (Mobile)                         Transition of Care Plan:                    Lives with wife. Verified home address and phone numbers are correct. Patient is not employed at this time. No DME or home health services. Patient uses [facility] pharmacy at [place]. Wife stated that patient sees a chiropractor and goes to [facility] on [address]. Has not be there in \"awhile\". Discharge Planning  1/ Schedule f/u PCP appointment. Care Management Interventions  PCP Verified by CM:  Yes  Transition of Care Consult (CM Consult): Discharge Planning  Support Systems: Spouse/Significant Other  The Plan for Transition of Care is Related to the Following Treatment Goals : F/U PCP appointment  The Patient and/or Patient Representative was Provided with a Choice of Provider and Agrees with the Discharge Plan?: Yes  Name of the Patient Representative Who was Provided with a Choice of Provider and Agrees with the Discharge Plan: Patient , Wife  Freedom of Choice List was Provided with Basic Dialogue that Supports the Patient's Individualized Plan of Care/Goals, Treatment Preferences and Shares the Quality Data Associated with the Providers?: Yes  Discharge Location  Discharge Placement:  [facility])     JO ANN Melchor/MILLIE  [phone]

## 2022-01-01 NOTE — PROGRESS NOTES
RT called to bedside to assess patient as he is more tachypnic than earlier tonight, feels short of breath, and was satting 88% on 3 LPM nasal cannula, turned up to 4 lpm and he slowly came up to 91%. Telehospitalist paged and made aware and per RT recommendation asked for MDIs to be ordered. Also notified of persistent hypertension.

## 2022-01-02 LAB
ALBUMIN SERPL-MCNC: 2.7 G/DL (ref 3.5–5)
ALBUMIN/GLOB SERPL: 0.7 {RATIO} (ref 1.1–2.2)
ALP SERPL-CCNC: 43 U/L (ref 45–117)
ALT SERPL-CCNC: 40 U/L (ref 12–78)
ANION GAP SERPL CALC-SCNC: 13 MMOL/L (ref 5–15)
AST SERPL-CCNC: 73 U/L (ref 15–37)
ATRIAL RATE: 102 BPM
BASOPHILS # BLD: 0 K/UL (ref 0–0.1)
BASOPHILS NFR BLD: 0 % (ref 0–1)
BILIRUB SERPL-MCNC: 1.5 MG/DL (ref 0.2–1)
BUN SERPL-MCNC: 29 MG/DL (ref 6–20)
BUN/CREAT SERPL: 16 (ref 12–20)
CALCIUM SERPL-MCNC: 7.7 MG/DL (ref 8.5–10.1)
CALCULATED P AXIS, ECG09: 34 DEGREES
CALCULATED R AXIS, ECG10: -4 DEGREES
CALCULATED T AXIS, ECG11: 126 DEGREES
CHLORIDE SERPL-SCNC: 98 MMOL/L (ref 97–108)
CO2 SERPL-SCNC: 24 MMOL/L (ref 21–32)
CREAT SERPL-MCNC: 1.84 MG/DL (ref 0.7–1.3)
CRP SERPL-MCNC: 8.04 MG/DL (ref 0–0.6)
DIAGNOSIS, 93000: NORMAL
DIFFERENTIAL METHOD BLD: ABNORMAL
EOSINOPHIL # BLD: 0 K/UL (ref 0–0.4)
EOSINOPHIL NFR BLD: 0 % (ref 0–7)
ERYTHROCYTE [DISTWIDTH] IN BLOOD BY AUTOMATED COUNT: 13.3 % (ref 11.5–14.5)
GLOBULIN SER CALC-MCNC: 3.9 G/DL (ref 2–4)
GLUCOSE SERPL-MCNC: 140 MG/DL (ref 65–100)
HCT VFR BLD AUTO: 40.3 % (ref 36.6–50.3)
HGB BLD-MCNC: 13.1 G/DL (ref 12.1–17)
IMM GRANULOCYTES # BLD AUTO: 0.1 K/UL (ref 0–0.04)
IMM GRANULOCYTES NFR BLD AUTO: 1 % (ref 0–0.5)
LYMPHOCYTES # BLD: 0.5 K/UL (ref 0.8–3.5)
LYMPHOCYTES NFR BLD: 10 % (ref 12–49)
MCH RBC QN AUTO: 28.5 PG (ref 26–34)
MCHC RBC AUTO-ENTMCNC: 32.5 G/DL (ref 30–36.5)
MCV RBC AUTO: 87.6 FL (ref 80–99)
MONOCYTES # BLD: 0.3 K/UL (ref 0–1)
MONOCYTES NFR BLD: 5 % (ref 5–13)
NEUTS SEG # BLD: 4.3 K/UL (ref 1.8–8)
NEUTS SEG NFR BLD: 84 % (ref 32–75)
NRBC # BLD: 0 K/UL (ref 0–0.01)
NRBC BLD-RTO: 0 PER 100 WBC
P-R INTERVAL, ECG05: 174 MS
PATH REV BLD -IMP: NORMAL
PLATELET # BLD AUTO: 151 K/UL (ref 150–400)
PMV BLD AUTO: 11.2 FL (ref 8.9–12.9)
POTASSIUM SERPL-SCNC: 3.2 MMOL/L (ref 3.5–5.1)
PROT SERPL-MCNC: 6.6 G/DL (ref 6.4–8.2)
Q-T INTERVAL, ECG07: 384 MS
QRS DURATION, ECG06: 86 MS
QTC CALCULATION (BEZET), ECG08: 500 MS
RBC # BLD AUTO: 4.6 M/UL (ref 4.1–5.7)
RBC MORPH BLD: ABNORMAL
SODIUM SERPL-SCNC: 135 MMOL/L (ref 136–145)
VENTRICULAR RATE, ECG03: 102 BPM
WBC # BLD AUTO: 5.2 K/UL (ref 4.1–11.1)

## 2022-01-02 PROCEDURE — 74011250637 HC RX REV CODE- 250/637: Performed by: STUDENT IN AN ORGANIZED HEALTH CARE EDUCATION/TRAINING PROGRAM

## 2022-01-02 PROCEDURE — 86140 C-REACTIVE PROTEIN: CPT

## 2022-01-02 PROCEDURE — 85025 COMPLETE CBC W/AUTO DIFF WBC: CPT

## 2022-01-02 PROCEDURE — 74011250637 HC RX REV CODE- 250/637: Performed by: INTERNAL MEDICINE

## 2022-01-02 PROCEDURE — 65270000032 HC RM SEMIPRIVATE

## 2022-01-02 PROCEDURE — 77030027138 HC INCENT SPIROMETER -A

## 2022-01-02 PROCEDURE — 36415 COLL VENOUS BLD VENIPUNCTURE: CPT

## 2022-01-02 PROCEDURE — 74011250636 HC RX REV CODE- 250/636: Performed by: STUDENT IN AN ORGANIZED HEALTH CARE EDUCATION/TRAINING PROGRAM

## 2022-01-02 PROCEDURE — 77010033678 HC OXYGEN DAILY

## 2022-01-02 PROCEDURE — 80053 COMPREHEN METABOLIC PANEL: CPT

## 2022-01-02 RX ADMIN — FAMOTIDINE 20 MG: 20 TABLET ORAL at 08:42

## 2022-01-02 RX ADMIN — SODIUM CHLORIDE, PRESERVATIVE FREE 10 ML: 5 INJECTION INTRAVENOUS at 13:16

## 2022-01-02 RX ADMIN — ACETAMINOPHEN 650 MG: 325 TABLET ORAL at 03:54

## 2022-01-02 RX ADMIN — DEXAMETHASONE 6 MG: 4 TABLET ORAL at 08:41

## 2022-01-02 RX ADMIN — FAMOTIDINE 20 MG: 20 TABLET ORAL at 17:39

## 2022-01-02 RX ADMIN — CARVEDILOL 6.25 MG: 3.12 TABLET, FILM COATED ORAL at 17:39

## 2022-01-02 RX ADMIN — GUAIFENESIN SYRUP AND DEXTROMETHORPHAN 10 ML: 100; 10 SYRUP ORAL at 08:41

## 2022-01-02 RX ADMIN — ALBUTEROL SULFATE 2 PUFF: 90 AEROSOL, METERED RESPIRATORY (INHALATION) at 20:33

## 2022-01-02 RX ADMIN — ENOXAPARIN SODIUM 40 MG: 100 INJECTION SUBCUTANEOUS at 08:41

## 2022-01-02 RX ADMIN — SODIUM CHLORIDE, PRESERVATIVE FREE 10 ML: 5 INJECTION INTRAVENOUS at 06:31

## 2022-01-02 RX ADMIN — CLONIDINE HYDROCHLORIDE 0.1 MG: 0.1 TABLET ORAL at 04:03

## 2022-01-02 RX ADMIN — ALBUTEROL SULFATE 2 PUFF: 90 AEROSOL, METERED RESPIRATORY (INHALATION) at 17:40

## 2022-01-02 RX ADMIN — OXYCODONE HYDROCHLORIDE AND ACETAMINOPHEN 500 MG: 500 TABLET ORAL at 17:39

## 2022-01-02 RX ADMIN — ZINC SULFATE 220 MG (50 MG) CAPSULE 1 CAPSULE: CAPSULE at 08:41

## 2022-01-02 RX ADMIN — ALBUTEROL SULFATE 2 PUFF: 90 AEROSOL, METERED RESPIRATORY (INHALATION) at 13:15

## 2022-01-02 RX ADMIN — BARICITINIB 2 MG: 2 TABLET, FILM COATED ORAL at 20:34

## 2022-01-02 RX ADMIN — CARVEDILOL 6.25 MG: 3.12 TABLET, FILM COATED ORAL at 08:41

## 2022-01-02 RX ADMIN — GUAIFENESIN SYRUP AND DEXTROMETHORPHAN 10 ML: 100; 10 SYRUP ORAL at 20:34

## 2022-01-02 RX ADMIN — Medication 3 MG: at 20:34

## 2022-01-02 RX ADMIN — BENZONATATE 100 MG: 100 CAPSULE ORAL at 13:15

## 2022-01-02 RX ADMIN — BENZONATATE 100 MG: 100 CAPSULE ORAL at 20:34

## 2022-01-02 RX ADMIN — GUAIFENESIN SYRUP AND DEXTROMETHORPHAN 10 ML: 100; 10 SYRUP ORAL at 13:15

## 2022-01-02 RX ADMIN — ALBUTEROL SULFATE 2 PUFF: 90 AEROSOL, METERED RESPIRATORY (INHALATION) at 08:43

## 2022-01-02 RX ADMIN — Medication 2000 UNITS: at 08:41

## 2022-01-02 RX ADMIN — POTASSIUM BICARBONATE 40 MEQ: 782 TABLET, EFFERVESCENT ORAL at 10:21

## 2022-01-02 RX ADMIN — OXYCODONE HYDROCHLORIDE AND ACETAMINOPHEN 500 MG: 500 TABLET ORAL at 08:41

## 2022-01-02 RX ADMIN — GUAIFENESIN SYRUP AND DEXTROMETHORPHAN 10 ML: 100; 10 SYRUP ORAL at 03:54

## 2022-01-02 NOTE — PROGRESS NOTES
1915: Shift report received from ELIZABETH Kang    2100: Messaged tele-hospitalist to make aware of patients MEWS score of 6 and to request medication to control patient's BP. Patient also requested melatonin and cough drops. 2123: Spoke with Dr. Rupinder Johnston. Orders placed for coreg, increased robitussin, melatonin, Iv lasix, B1, Pepcid, albuterol QID, and d/c IVF . Also made him aware that the patient is currently on 6L NC with O2 sats at 92-96%. We will try a venti mask as the patient is breathing a lot from his mouth. May possibly need a higher level of care if he does not improve. Will continue to monitor closely. 2318: Reassessed patient, MEWS 3. He is resting in the bed and is much more comfortable. Temp is 100.4 and RR 28. I instructed the patient to let me know if he begins to feel febrile again or if he needs any PRN medications. Will try and let the patient get some rest as he hasn't been able to sleep well. 0354: Mews 4 due to temp of 100.5, HR of 105, RR 32, O2 sat 92% on 6L NC, and BP of 154/104. PRN tylenol given for fever, PRN robitussin given for cough, and PRN clonidine given for BP.     0610: MEWS 2 temp is 98.6, RR 91, /82, RR 28 and O2 sat 94% on 6L NC.  Patient is resting quietly in the bed.     0715: Report given to ELIZABETH Kang

## 2022-01-02 NOTE — PROGRESS NOTES
Hospitalist Progress Note    NAME: Albina Aschoff   :  1970   MRN:  [id number]   Room Number:  Hesham Moreira  @ [address] [facility]       Interim Hospital Summary: 45 y.o. male whom presented on 2021 with      Assessment / Plan:      #Acute hypoxic respiratory failure requiring supplemental oxygen POA,  #COVID-19 pneumonia POA  -Chest x-ray reviewed independently with airspace disease on admission and repeat CXR w/ worsening airspace disease   -Covid PCR positive  -Requiring 4 L supplemental oxygen  -Pro-Mckinley 0.12 > 0.08  -CRP 5.82 > 8.04 , ferritin 1392 > 1580, ,  -VQ scan without evidence of pulmonary embolism    -Continue supplemental oxygen to keep above 94  -Dexamethasone and baricitinib  -Albuterol as needed  -Tylenol as needed  -Antitussive as needed  -Vitamin C and zinc    #Lymphopenia resolved   -Continue monitor while on baricitinib      #RUTH on CKD 3B resolved  -On admission serum creatinine 2.44 > 1.98 > 1.84  (baseline 1.70)       #Hypertension  -Hold for now    Code status: Full  Prophylaxis: Lovenox  Recommended Disposition: Home w/Family     Subjective:     Chief Complaint / Reason for Physician Visit  Tachypnea earlier this am fever  , now better  discussed with RN events overnight. Review of Systems:  , No appetite change, c, sputum production, ,nausea, vomitting, diarrhea, constipation, chest pain, leg edema, abdominal pain, joint pain, rash, itching. Tolerating PT/OT. Tolerating diet. Objective:     VITALS:   Last 24hrs VS reviewed since prior progress note.  Most recent are:  Patient Vitals for the past 24 hrs:   Temp Pulse Resp BP SpO2   22 0841 -- 89 -- (!) 131/92 --   22 0804 100 °F (37.8 °C) 89 26 124/87 96 %   22 0610 98.6 °F (37 °C) 91 28 113/82 94 %   22 0507 -- 90 -- 114/81 97 %   22 0354 (!) 100.5 °F (38.1 °C) (!) 105 (!) 32 (!) 154/104 92 %   22 2319 -- (!) 106 -- (!) 141/99 --   22 2318 100.4 °F (38 °C) (!) 105 28 (!) 141/99 92 %   01/01/22 2042 (!) 101.5 °F (38.6 °C) (!) 113 28 (!) 151/108 94 %   01/01/22 1800 -- (!) 106 -- (!) 138/96 91 %   01/01/22 1700 -- (!) 104 -- (!) 145/101 94 %   01/01/22 1600 -- (!) 105 -- (!) 125/102 91 %   01/01/22 1516 -- (!) 104 -- -- --   01/01/22 1400 -- 98 -- (!) 139/98 95 %   01/01/22 1352 -- 99 -- (!) 136/102 93 %   01/01/22 1326 -- -- -- -- 95 %   01/01/22 1309 -- 90 -- -- 96 %   01/01/22 1308 -- 88 -- 107/84 93 %   01/01/22 1307 -- 83 -- 100/76 94 %   01/01/22 1306 -- 82 -- 96/64 92 %   01/01/22 1305 -- 80 -- -- (!) 87 %   01/01/22 1304 -- 73 -- -- (!) 86 %   01/01/22 1302 -- 76 -- -- (!) 83 %   01/01/22 1200 -- 100 -- -- --   01/01/22 1136 -- (!) 104 -- (!) 184/124 --       Intake/Output Summary (Last 24 hours) at 1/2/2022 0948  Last data filed at 1/2/2022 0507  Gross per 24 hour   Intake 1240.83 ml   Output 1325 ml   Net -84.17 ml        PHYSICAL EXAM:  General: WD, WN. Alert, cooperative, no acute distress    EENT:  EOMI. Anicteric sclerae. MMM  Resp:  CTA bilaterally, no wheezing or rales. No accessory muscle use  CV:  Regular  rhythm,  normal S1/S2, no murmurs rubs gallops, No edema  GI:  Soft, Non distended, Non tender. +Bowel sounds  Neurologic:  Alert and oriented X 3, normal speech,   Psych:   Good insight. Not anxious nor agitated  Skin:  No rashes. No jaundice    Reviewed most current lab test results and cultures  YES  Reviewed most current radiology test results   YES  Review and summation of old records today    NO  Reviewed patient's current orders and MAR    YES  PMH/SH reviewed - no change compared to H&P  ________________________________________________________________________  Care Plan discussed with:    Comments   Patient x    Family      RN x    Care Manager     Consultant                        Multidiciplinary team rounds were held today with , nursing, pharmacist and clinical coordinator.   Patient's plan of care was discussed; medications were reviewed and discharge planning was addressed. ________________________________________________________________________  Total NON critical care TIME:  45  Minutes    Total CRITICAL CARE TIME Spent:   Minutes non procedure based      Comments   >50% of visit spent in counseling and coordination of care x    ________________________________________________________________________  Kathrine Ortega MD     Procedures: see electronic medical records for all procedures/Xrays and details which were not copied into this note but were reviewed prior to creation of Plan. LABS:  I reviewed today's most current labs and imaging studies.   Pertinent labs include:  Recent Labs     01/02/22  0431 01/01/22  0251 12/31/21  1449   WBC 5.2 1.8* 3.5*   HGB 13.1 14.1 14.5   HCT 40.3 43.2 44.5    156 152     Recent Labs     01/02/22  0431 01/01/22  0251 12/31/21  1449   * 139 136   K 3.2* 3.9 3.3*   CL 98 100 98   CO2 24 24 26   * 126* 113*   BUN 29* 26* 27*   CREA 1.84* 1.98* 2.44*   CA 7.7* 7.7* 8.4*   MG  --  1.9  --    PHOS  --  4.2  --    ALB 2.7* 3.0*  3.0* 3.3*   TBILI 1.5* 1.2*  1.2* 1.6*   ALT 40 44  42 43   INR  --  1.0 1.1       Signed: Kathrine Ortega MD

## 2022-01-02 NOTE — PROGRESS NOTES
Tele-Hospitalist Cross coverage  Chart reviewed; Patient with AHRF/Covid 19   Currently on 6L 92-96%; RR 28; 101.5, 151/108  CXR reviewed; worsening infiltrates b/l    Place on venti mask  Albuterol QID  Restart Coreg: BP back up  Pepcid/B1/Melatonin  Lasix 40 mg IV x1  DC IVF  D/w nursing: monitor closely: may need transfer if 02 requirement increases;   Change to Lovenox 40 BID  Currently awake alert

## 2022-01-03 ENCOUNTER — APPOINTMENT (OUTPATIENT)
Dept: GENERAL RADIOLOGY | Age: 52
DRG: 720 | End: 2022-01-03
Attending: STUDENT IN AN ORGANIZED HEALTH CARE EDUCATION/TRAINING PROGRAM
Payer: MEDICAID

## 2022-01-03 ENCOUNTER — APPOINTMENT (OUTPATIENT)
Dept: VASCULAR SURGERY | Age: 52
DRG: 720 | End: 2022-01-03
Attending: STUDENT IN AN ORGANIZED HEALTH CARE EDUCATION/TRAINING PROGRAM
Payer: MEDICAID

## 2022-01-03 LAB
ALBUMIN SERPL-MCNC: 2.5 G/DL (ref 3.5–5)
ALBUMIN/GLOB SERPL: 0.6 {RATIO} (ref 1.1–2.2)
ALP SERPL-CCNC: 47 U/L (ref 45–117)
ALT SERPL-CCNC: 44 U/L (ref 12–78)
ANION GAP SERPL CALC-SCNC: 11 MMOL/L (ref 5–15)
AST SERPL-CCNC: 72 U/L (ref 15–37)
BACTERIA SPEC CULT: NORMAL
BASOPHILS # BLD: 0 K/UL (ref 0–0.1)
BASOPHILS NFR BLD: 0 % (ref 0–1)
BILIRUB SERPL-MCNC: 1.1 MG/DL (ref 0.2–1)
BUN SERPL-MCNC: 36 MG/DL (ref 6–20)
BUN/CREAT SERPL: 20 (ref 12–20)
CALCIUM SERPL-MCNC: 8.1 MG/DL (ref 8.5–10.1)
CHLORIDE SERPL-SCNC: 100 MMOL/L (ref 97–108)
CO2 SERPL-SCNC: 25 MMOL/L (ref 21–32)
CREAT SERPL-MCNC: 1.82 MG/DL (ref 0.7–1.3)
CRP SERPL-MCNC: 9.71 MG/DL (ref 0–0.6)
D DIMER PPP FEU-MCNC: 2.4 MG/L FEU (ref 0–0.65)
DIFFERENTIAL METHOD BLD: ABNORMAL
EOSINOPHIL # BLD: 0 K/UL (ref 0–0.4)
EOSINOPHIL NFR BLD: 0 % (ref 0–7)
ERYTHROCYTE [DISTWIDTH] IN BLOOD BY AUTOMATED COUNT: 13.3 % (ref 11.5–14.5)
GLOBULIN SER CALC-MCNC: 4.2 G/DL (ref 2–4)
GLUCOSE SERPL-MCNC: 120 MG/DL (ref 65–100)
GRAM STN SPEC: NORMAL
HCT VFR BLD AUTO: 39 % (ref 36.6–50.3)
HGB BLD-MCNC: 12.6 G/DL (ref 12.1–17)
IMM GRANULOCYTES # BLD AUTO: 0.1 K/UL
IMM GRANULOCYTES NFR BLD AUTO: 1 %
L PNEUMO1 AG UR QL IA: NEGATIVE
LYMPHOCYTES # BLD: 0.4 K/UL (ref 0.8–3.5)
LYMPHOCYTES NFR BLD: 7 % (ref 12–49)
MCH RBC QN AUTO: 28.3 PG (ref 26–34)
MCHC RBC AUTO-ENTMCNC: 32.3 G/DL (ref 30–36.5)
MCV RBC AUTO: 87.4 FL (ref 80–99)
MONOCYTES # BLD: 0.3 K/UL (ref 0–1)
MONOCYTES NFR BLD: 6 % (ref 5–13)
NEUTS SEG # BLD: 4.8 K/UL (ref 1.8–8)
NEUTS SEG NFR BLD: 86 % (ref 32–75)
NRBC # BLD: 0 K/UL (ref 0–0.01)
NRBC BLD-RTO: 0 PER 100 WBC
PLATELET # BLD AUTO: 180 K/UL (ref 150–400)
PMV BLD AUTO: 11.1 FL (ref 8.9–12.9)
POTASSIUM SERPL-SCNC: 3.5 MMOL/L (ref 3.5–5.1)
PROCALCITONIN SERPL-MCNC: 0.32 NG/ML
PROT SERPL-MCNC: 6.7 G/DL (ref 6.4–8.2)
RBC # BLD AUTO: 4.46 M/UL (ref 4.1–5.7)
RBC MORPH BLD: ABNORMAL
SERVICE CMNT-IMP: NORMAL
SODIUM SERPL-SCNC: 136 MMOL/L (ref 136–145)
SPECIMEN SOURCE: NORMAL
WBC # BLD AUTO: 5.6 K/UL (ref 4.1–11.1)
WBC MORPH BLD: ABNORMAL

## 2022-01-03 PROCEDURE — 36415 COLL VENOUS BLD VENIPUNCTURE: CPT

## 2022-01-03 PROCEDURE — 77010033678 HC OXYGEN DAILY

## 2022-01-03 PROCEDURE — 74011250637 HC RX REV CODE- 250/637: Performed by: STUDENT IN AN ORGANIZED HEALTH CARE EDUCATION/TRAINING PROGRAM

## 2022-01-03 PROCEDURE — 71045 X-RAY EXAM CHEST 1 VIEW: CPT

## 2022-01-03 PROCEDURE — 5A09357 ASSISTANCE WITH RESPIRATORY VENTILATION, LESS THAN 24 CONSECUTIVE HOURS, CONTINUOUS POSITIVE AIRWAY PRESSURE: ICD-10-PCS | Performed by: STUDENT IN AN ORGANIZED HEALTH CARE EDUCATION/TRAINING PROGRAM

## 2022-01-03 PROCEDURE — 85025 COMPLETE CBC W/AUTO DIFF WBC: CPT

## 2022-01-03 PROCEDURE — 94660 CPAP INITIATION&MGMT: CPT

## 2022-01-03 PROCEDURE — 74011250637 HC RX REV CODE- 250/637: Performed by: INTERNAL MEDICINE

## 2022-01-03 PROCEDURE — 65270000032 HC RM SEMIPRIVATE

## 2022-01-03 PROCEDURE — 94621 CARDIOPULM EXERCISE TESTING: CPT

## 2022-01-03 PROCEDURE — 74011250636 HC RX REV CODE- 250/636: Performed by: STUDENT IN AN ORGANIZED HEALTH CARE EDUCATION/TRAINING PROGRAM

## 2022-01-03 PROCEDURE — 93970 EXTREMITY STUDY: CPT

## 2022-01-03 PROCEDURE — 93970 EXTREMITY STUDY: CPT | Performed by: INTERNAL MEDICINE

## 2022-01-03 PROCEDURE — 85379 FIBRIN DEGRADATION QUANT: CPT

## 2022-01-03 PROCEDURE — 80053 COMPREHEN METABOLIC PANEL: CPT

## 2022-01-03 PROCEDURE — 84145 PROCALCITONIN (PCT): CPT

## 2022-01-03 PROCEDURE — 86140 C-REACTIVE PROTEIN: CPT

## 2022-01-03 PROCEDURE — 74011000250 HC RX REV CODE- 250: Performed by: STUDENT IN AN ORGANIZED HEALTH CARE EDUCATION/TRAINING PROGRAM

## 2022-01-03 RX ORDER — LANOLIN ALCOHOL/MO/W.PET/CERES
6 CREAM (GRAM) TOPICAL
Status: DISCONTINUED | OUTPATIENT
Start: 2022-01-03 | End: 2022-01-04 | Stop reason: HOSPADM

## 2022-01-03 RX ORDER — HYDROXYZINE 25 MG/1
25 TABLET, FILM COATED ORAL
Status: COMPLETED | OUTPATIENT
Start: 2022-01-03 | End: 2022-01-03

## 2022-01-03 RX ADMIN — GUAIFENESIN SYRUP AND DEXTROMETHORPHAN 10 ML: 100; 10 SYRUP ORAL at 21:04

## 2022-01-03 RX ADMIN — CARVEDILOL 6.25 MG: 3.12 TABLET, FILM COATED ORAL at 16:13

## 2022-01-03 RX ADMIN — ALBUTEROL SULFATE 2 PUFF: 90 AEROSOL, METERED RESPIRATORY (INHALATION) at 21:05

## 2022-01-03 RX ADMIN — FAMOTIDINE 20 MG: 20 TABLET ORAL at 17:48

## 2022-01-03 RX ADMIN — OXYCODONE HYDROCHLORIDE AND ACETAMINOPHEN 500 MG: 500 TABLET ORAL at 17:48

## 2022-01-03 RX ADMIN — CARVEDILOL 6.25 MG: 3.12 TABLET, FILM COATED ORAL at 08:29

## 2022-01-03 RX ADMIN — BENZONATATE 100 MG: 100 CAPSULE ORAL at 16:13

## 2022-01-03 RX ADMIN — Medication 2000 UNITS: at 08:28

## 2022-01-03 RX ADMIN — ACETAMINOPHEN 650 MG: 325 TABLET ORAL at 21:04

## 2022-01-03 RX ADMIN — ENOXAPARIN SODIUM 40 MG: 100 INJECTION SUBCUTANEOUS at 08:30

## 2022-01-03 RX ADMIN — BARICITINIB 2 MG: 2 TABLET, FILM COATED ORAL at 21:04

## 2022-01-03 RX ADMIN — FAMOTIDINE 20 MG: 20 TABLET ORAL at 08:28

## 2022-01-03 RX ADMIN — HYDROXYZINE HYDROCHLORIDE 25 MG: 25 TABLET ORAL at 22:13

## 2022-01-03 RX ADMIN — GUAIFENESIN SYRUP AND DEXTROMETHORPHAN 10 ML: 100; 10 SYRUP ORAL at 14:50

## 2022-01-03 RX ADMIN — GUAIFENESIN SYRUP AND DEXTROMETHORPHAN 10 ML: 100; 10 SYRUP ORAL at 08:33

## 2022-01-03 RX ADMIN — BENZONATATE 100 MG: 100 CAPSULE ORAL at 21:04

## 2022-01-03 RX ADMIN — ALBUTEROL SULFATE 2 PUFF: 90 AEROSOL, METERED RESPIRATORY (INHALATION) at 11:43

## 2022-01-03 RX ADMIN — ALBUTEROL SULFATE 2 PUFF: 90 AEROSOL, METERED RESPIRATORY (INHALATION) at 08:28

## 2022-01-03 RX ADMIN — ZINC SULFATE 220 MG (50 MG) CAPSULE 1 CAPSULE: CAPSULE at 08:28

## 2022-01-03 RX ADMIN — DEXAMETHASONE 6 MG: 4 TABLET ORAL at 08:29

## 2022-01-03 RX ADMIN — CLONIDINE HYDROCHLORIDE 0.1 MG: 0.1 TABLET ORAL at 03:32

## 2022-01-03 RX ADMIN — OXYCODONE HYDROCHLORIDE AND ACETAMINOPHEN 500 MG: 500 TABLET ORAL at 08:28

## 2022-01-03 RX ADMIN — GUAIFENESIN SYRUP AND DEXTROMETHORPHAN 10 ML: 100; 10 SYRUP ORAL at 03:32

## 2022-01-03 RX ADMIN — Medication 100 MG: at 08:31

## 2022-01-03 RX ADMIN — SODIUM CHLORIDE, PRESERVATIVE FREE 10 ML: 5 INJECTION INTRAVENOUS at 13:54

## 2022-01-03 RX ADMIN — ALBUTEROL SULFATE 2 PUFF: 90 AEROSOL, METERED RESPIRATORY (INHALATION) at 16:14

## 2022-01-03 RX ADMIN — POTASSIUM BICARBONATE 50 MEQ: 977.5 TABLET, EFFERVESCENT ORAL at 17:49

## 2022-01-03 RX ADMIN — SODIUM CHLORIDE, PRESERVATIVE FREE 10 ML: 5 INJECTION INTRAVENOUS at 21:05

## 2022-01-03 NOTE — PROGRESS NOTES
YENI   RUR 9 %     IDR rounds with MD and team today   Continue Plan of care   Patient with increase oxygen needs. Was unable to do six min test.   Will need specific orders for oxygen to set up for home oxygen     See CM note for discharge planning.    Needs PCP - to establish new provider. -lot of offices are closed today     [address] ISABELLA RN   [phone]

## 2022-01-03 NOTE — PROGRESS NOTES
Hospitalist Progress Note    NAME: Arpit Chavez   :  1970   MRN:  [id number]   Room Number:  Liz Mixon  @ [address] [facility]       Interim Hospital Summary: 45 y.o. male whom presented on 2021 with      Assessment / Plan:  Anticipated discharge date : 2-3 days   Anticipated disposition : Home  Barriers to discharge : hypoxia      COVID Pneumonia POA causing   Acute respiratory failure with hypoxia POA &   Sepsis POA  Hemoptysis POA  COVID PCR  positive. CXR interpreted independently - bilateral patchy opacitiis bilaterally. Elevated d-dimer, ferritin, CRP. V/Q scan showed low probability of PE     - Dexamethasone D4  - Droplet plus   - Pharmacy consult for barcitinib, meets criteria. - Ascorbic acid, zinc  - trend inflammatory markers  - prone positioning       Acute renal failure POA on CKD III   Baseline Cr 1.7. Suspect CKD due to long standing hypertension. RUTH likely pre-renal due to hypovolemia, may have component of ATN due to COVID     - Strict Is and Os, avoid nephrotoxic meds, renally dose meds,   -  renal  - Check urine lytes        Uncontrolled hypertension POA  Hold triamterene, lisinopril, HCTZ  - Resume amlodipine      Body mass index is 35.04 kg/m². Morbid obesity POA  Counseled on Lifestyle modifications, AHA Diet ,weight loss strategies.     Code Status: full   Surrogate Decision Maker:  wife     DVT Prophylaxis: Lovenox  GI Prophylaxis: not indicated  Baseline: ambulatory independently        Subjective:     Chief Complaint / Reason for Physician Visit  \"feel short of breath when I walk\". Discussed with RN events overnight. Review of Systems:  + shortness of breath, dyspnea on exertion. No fevers, chills, appetite change, cough, sputum production, nausea, vomitting, diarrhea, constipation, chest pain, leg edema, abdominal pain, joint pain, rash, itching. Tolerating PT/OT. Tolerating diet.        Objective:     VITALS:   Last 24hrs VS reviewed since prior progress note. Most recent are:  Patient Vitals for the past 24 hrs:   Temp Pulse Resp BP SpO2   01/03/22 1047 98.3 °F (36.8 °C) 84 24 (!) 111/96 91 %   01/03/22 0827 98.5 °F (36.9 °C) 87 24 (!) 147/81 92 %   01/03/22 0331 99.5 °F (37.5 °C) 96 28 (!) 160/104 92 %   01/02/22 2307 98.9 °F (37.2 °C) 93 28 134/84 93 %   01/02/22 2022 99.6 °F (37.6 °C) 88 (!) 32 (!) 130/96 95 %   01/02/22 1800 -- 83 -- (!) 136/102 95 %   01/02/22 1739 -- 90 -- (!) 142/96 --   01/02/22 1645 98.8 °F (37.1 °C) -- -- -- --   01/02/22 1600 98.8 °F (37.1 °C) 87 24 (!) 142/96 93 %   01/02/22 1400 -- 82 -- (!) 139/90 92 %       Intake/Output Summary (Last 24 hours) at 1/3/2022 1356  Last data filed at 1/2/2022 2307  Gross per 24 hour   Intake 800 ml   Output 525 ml   Net 275 ml        PHYSICAL EXAM:  General: Alert, cooperative, no acute distress    EENT:  EOMI. Anicteric sclerae. MMM  Resp:  CTA bilaterally, no wheezing or rales. No accessory muscle use  CV:  Regular  rhythm,  normal S1/S2, no murmurs rubs gallops, No edema  GI:  Soft, Non distended, Non tender. +Bowel sounds  Neurologic:  Alert and oriented X 3, normal speech,   Psych:   Good insight. Not anxious nor agitated  Skin:  No rashes. No jaundice    Reviewed most current lab test results and cultures  YES  Reviewed most current radiology test results   YES  Review and summation of old records today    NO  Reviewed patient's current orders and MAR    YES  PMH/ reviewed - no change compared to H&P  ________________________________________________________________________  Care Plan discussed with:    Comments   Patient x    Family      RN x    Care Manager x    Consultant                       x Multidiciplinary team rounds were held today with , nursing, pharmacist and clinical coordinator. Patient's plan of care was discussed; medications were reviewed and discharge planning was addressed. ________________________________________________________________________  Total NON critical care TIME: 25 Minutes    Total CRITICAL CARE TIME Spent:   Minutes non procedure based      Comments   >50% of visit spent in counseling and coordination of care     ________________________________________________________________________  Nader West MD     Procedures: see electronic medical records for all procedures/Xrays and details which were not copied into this note but were reviewed prior to creation of Plan. LABS:  I reviewed today's most current labs and imaging studies. Pertinent labs include:  Recent Labs     01/02/22  0431 01/01/22  0251 12/31/21  1449   WBC 5.2 1.8* 3.5*   HGB 13.1 14.1 14.5   HCT 40.3 43.2 44.5    156 152     Recent Labs     01/03/22  0423 01/02/22  0431 01/01/22  0251 12/31/21  1449 12/31/21  1449    135* 139   < > 136   K 3.5 3.2* 3.9   < > 3.3*    98 100   < > 98   CO2 25 24 24   < > 26   * 140* 126*   < > 113*   BUN 36* 29* 26*   < > 27*   CREA 1.82* 1.84* 1.98*   < > 2.44*   CA 8.1* 7.7* 7.7*   < > 8.4*   MG  --   --  1.9  --   --    PHOS  --   --  4.2  --   --    ALB 2.5* 2.7* 3.0*  3.0*   < > 3.3*   TBILI 1.1* 1.5* 1.2*  1.2*   < > 1.6*   ALT 44 40 44  42   < > 43   INR  --   --  1.0  --  1.1    < > = values in this interval not displayed.        Signed: Nader West MD

## 2022-01-03 NOTE — DISCHARGE SUMMARY
Hospitalist Discharge Summary     Patient ID:  Mendy Peralta  [id number]  1 y.o.  1970    PCP on record: None    Admit date: 12/31/2021  Discharge date and time: 1/4/2022      Admission Diagnoses: COVID [U07.1]    Discharge Diagnoses: Active Problems:    COVID (12/31/2021)           Hospital Course:     COVID Pneumonia POA causing   Acute respiratory failure with hypoxia POA &   Sepsis POA  Hemoptysis POA  COVID PCR 12/26 positive. CXR interpreted independently - bilateral patchy opacitiis bilaterally. Elevated d-dimer, ferritin, CRP. Treated with supplemental oxygen, barcitinib, dexamethasone, ascorbic acid, zinc. Oxygen requirements continued to increase and patient was transferred to [facility] for high flow oxygen, patient accepted by Dr Flako Fischer. Repeat V/Q indeterminate for PE. Given rising ddimer, worsening hypoxia, will start treatment dose anticoagulation         Acute renal failure POA on CKD III   Baseline Cr 1.7. Suspect CKD due to long standing hypertension. RUTH likely pre-renal due to hypovolemia, may have component of ATN due to COVID      Uncontrolled hypertension POA  Hold triamterene, lisinopril, HCTZ  - Resume amlodipine      Body mass index is 35.04 kg/m². Morbid obesity POA  Counseled on Lifestyle modifications, AHA Diet ,weight loss strategies. CONSULTATIONS:  None    Excerpted HPI from H&P of Shaun Smith MD:    45 y.o.  male with PMH of HTN, CKD who presents to ED with c/o hemoptysis. Patient has had symptoms of generalized body aches, fatigue, malaise, myalgias, chills, shortness of breath, nonproductive cough, sore throat for the past 8 days. He was evaluated in [address] [place] [address] emergency room on 12/26 and discharged home. Covid PCR positive on 12/26. Patient does not vaccinated for Covid. Patient symptoms have progressively become worse since then. He has been taking Tylenol to help with body aches. Has not used any NSAIDs. Has not taken blood pressure medications for the past week as he has run out of them. Reports loss of appetite and poor oral intake due to loss of sense of taste or smell.     We were asked to admit for work up and evaluation of the above problems. ______________________________________________________________________  DISCHARGE SUMMARY/HOSPITAL COURSE:  for full details see H&P, daily progress notes, labs, consult notes. Visit Vitals  BP (!) 139/94 (BP 1 Location: Right upper arm, BP Patient Position: Sitting)   Pulse 88   Temp 98.9 °F (37.2 °C)   Resp 28   Ht 5' 10\" (1.778 m)   Wt 112.1 kg (247 lb 1.6 oz)   SpO2 95%   BMI 35.46 kg/m²     _______________________________________________________________________  Patient seen and examined by me on discharge day. Pertinent Findings:  Gen:    Not in distress  Chest: Clear lungs  CVS:   Regular rhythm. No edema  Abd:  Soft, not distended, not tender  Neuro:  Alert with good insight. Oriented to person, place, and time   _______________________________________________________________________  DISCHARGE MEDICATIONS:   Current Discharge Medication List          My Recommended Diet, Activity, Wound Care, and follow-up labs are listed in the patient's Discharge Insturctions which I have personally completed and reviewed.     _______________________________________________________________________  DISPOSITION:     Home with Family:    Home with HH/PT/OT/RN:    ROSEMARY/LTC:    BRIDGER:    OTHER: [facility]       Condition at Discharge:  Stable  _______________________________________________________________________  Follow up with:   PCP : None  Follow-up Information     Follow up With Specialties Details Why [facility]  On 1/6/2022 yOU [address] [place] [address]   [phone]    None    None ([phone]) Patient stated that they have no PCP                Total time in minutes spent coordinating this discharge (includes going over instructions, follow-up, prescriptions, and preparing report for sign off to her PCP) : 35 minutes    Signed:  Dain Garcia MD

## 2022-01-03 NOTE — PROGRESS NOTES
Physician Progress Note      PATIENTCotheo Cannon  CSN #:                  [id number]  :                       1970  ADMIT DATE:       2021 2:21 PM  [address] [place] DATE:  RESPONDING  PROVIDER #:        Elizabeth Lutz MD [facility] MD          QUERY TEXT:    Patient admitted with hemoptysis. Documentation reflects \"Sepsis POA\" in H&P dated . If possible, please document in the progress notes and discharge summary if \"Sepsis\"  was: The medical record reflects the following:  Risk Factors: Hx: HTN; CKD. Lilo Red Lilo Red C/o SOB  Clinical Indicators: tmax: 101.4; hr: ; rr: 28-37; 86% RA O2 sat in ED. ... 99% on 3L NC. .... Lilo Red POC Lac acid: 0.90; wbc: 3.5 ^ 1.8; bands: 2.5 ^ 0.9; d-dimer: 1.15 . .^ 2.40; Na+: 136; K+: 3.3; gluc: 113; bun/cr: 27/2.44; bili: 1.6; LD: 496; Ferritin: 1,392; Procal: 0.12; CRP: 5.82. Lilo Red ^ 9.71. ... Lilo Red BCx: NG; Resp. cx: neg. ... Lilo Red Lilo Red CXR: Airspace disease. .. Lilo Red NM Lung Scan: Neg. ... Lilo Red Lilo Red Noted: Covid PCR positive on . Treatment: Labs; POC Lac acid; BCx; CXR; Resp. Cx; Inflammatory markers; PO Tylenol; PO Motrin; IVF Bolus; IV Decadron; IV Zithromax and IV Rocephin; Supplemental O2; Vit. C and Zinc    Thank you,    Cherelle Borjas  [facility]  [phone]  Options provided:  -- Sepsis confirmed after study  -- Sepsis due to COVID PNA confirmed after study  -- Sepsis treated and resolved  -- Sepsis due to COVID PNA treated and resolved  -- Sepsis ruled out after study  -- Other - I will add my own diagnosis  -- Disagree - Not applicable / Not valid  -- Disagree - Clinically unable to determine / Unknown  -- Refer to Clinical Documentation Reviewer    PROVIDER RESPONSE TEXT:    Sepsis due to COVID PNA confirmed after study.     Query created by: Yi Gaytan on 1/3/2022 1:47 PM      Electronically signed by:  Elizabeth Lutz MD [facility] MD 1/3/2022 4:12 PM

## 2022-01-03 NOTE — PROGRESS NOTES
1900: Bedside and Verbal shift change report given to ELZIABETH Kang (oncoming nurse) by Cari Au RN (offgoing nurse). Report included the following information SBAR, Kardex, MAR and Recent Results. 2034: PRN Tessalon and Robitussin DM administered for cough. PRN melatonin administered for sleep. 2307: Oxygen titrated up to 5 liters due to patients oxygen saturation going into the 80's. 4795: PRN Clonidine administered for Hypertension. PRN Robitussin administered for cough. 0500: Lab called and stated the patient's CBC clotted. I explained we had to stick him 3 times and he was a very hard stick. The patient is trying to rest as he has not been able to sleep well. Will pass on to day shift that he will need a CBC drawn. 0700: Bedside and Verbal shift change report given to ELIZABETH TOUSSAINT (oncoming nurse) by Cari Au RN (offgoing nurse). Report included the following information SBAR, Kardex, MAR and Recent Results.

## 2022-01-03 NOTE — PROGRESS NOTES
0710) Bedside shift change report given to [facility], RN and Aure Parks RN (oncoming nurse) by Jase Gallagher RN (offgoing nurse). Report included the following information SBAR, Kardex, MAR and Recent Results. Pt afebrile overnight. Clonidine PRN given overnight. Pt placed on 5L nasal canula while sleeping  0820) pt 88% on room air. After pt walked to bathroom this dropped to 84%. Pt placed on 2L of oxygen at rest--oxygen enrique to 92%. 1025) IDR with Dr. Vijaya MORAN), [facility] [employer] (pharmacist), Lakesha Hernandez (), Danilo Sullivan (nurse supervisor),  and [facility] (RN) to discuss plan of care including six-minute walk, discontinue isolation. 1115) One attempt at 6 minutes walk. With movement, pt oxygen drop to 88% ib 4L of oxygen. Pt large stool incontinence. Pt report dyspnea with movement--coughing. 1150) Discuss with Dr. Vijaya Gentile, pt currently requiring more than 6 L oxygen while ambulating with 6 minute walk  1500) Discuss with Tessie, , pt help applying for disability. 1520) Consult Dr. Vijaya Gentile, pt 6th bowel movement since lunch (loose), pt /106-- consider restart amlodipine,, also pt report difficulty sleeping despite 3 mg of melatonin. 1755) Discuss with Dr. Vijaya Gentile pt 89% to 91% on 6L. Pt request to sit in chair. More dyspneic. 1830) Discuss with Dr. Vijaya Gentile, pt 87% while sleeping on 6L. Pt was 92% on 2L while awake. Discuss CPAP order. Repeat chest x-ray. 1950) Bedside shift change report given to Kirstin Thomason, ELIZABETH (oncoming nurse) by [facility], RN (offgoing nurse). Report included the following information SBAR, Kardex, MAR, Recent Results and Cardiac Rhythm NS.

## 2022-01-04 ENCOUNTER — APPOINTMENT (OUTPATIENT)
Dept: NUCLEAR MEDICINE | Age: 52
DRG: 720 | End: 2022-01-04
Attending: STUDENT IN AN ORGANIZED HEALTH CARE EDUCATION/TRAINING PROGRAM
Payer: MEDICAID

## 2022-01-04 ENCOUNTER — HOSPITAL ENCOUNTER (INPATIENT)
Age: 52
LOS: 4 days | Discharge: HOME | DRG: 137 | End: 2022-01-09
Attending: FAMILY MEDICINE | Admitting: INTERNAL MEDICINE
Payer: MEDICAID

## 2022-01-04 ENCOUNTER — APPOINTMENT (OUTPATIENT)
Dept: GENERAL RADIOLOGY | Age: 52
DRG: 720 | End: 2022-01-04
Attending: INTERNAL MEDICINE
Payer: MEDICAID

## 2022-01-04 VITALS
HEIGHT: 70 IN | DIASTOLIC BLOOD PRESSURE: 103 MMHG | TEMPERATURE: 98.3 F | BODY MASS INDEX: 35.37 KG/M2 | OXYGEN SATURATION: 94 % | WEIGHT: 247.1 LBS | RESPIRATION RATE: 32 BRPM | SYSTOLIC BLOOD PRESSURE: 144 MMHG | HEART RATE: 68 BPM

## 2022-01-04 LAB
ALBUMIN SERPL-MCNC: 2.5 G/DL (ref 3.5–5)
ALBUMIN/GLOB SERPL: 0.6 {RATIO} (ref 1.1–2.2)
ALP SERPL-CCNC: 49 U/L (ref 45–117)
ALT SERPL-CCNC: 50 U/L (ref 12–78)
ANION GAP SERPL CALC-SCNC: 13 MMOL/L (ref 5–15)
ARTERIAL PATENCY WRIST A: YES
AST SERPL-CCNC: 60 U/L (ref 15–37)
BASE EXCESS BLDA CALC-SCNC: 0.9 MMOL/L
BASOPHILS # BLD: 0 K/UL (ref 0–0.1)
BASOPHILS NFR BLD: 0 % (ref 0–1)
BDY SITE: ABNORMAL
BILIRUB SERPL-MCNC: 1 MG/DL (ref 0.2–1)
BNP SERPL-MCNC: 2083 PG/ML (ref 0–125)
BREATHS.SPONTANEOUS ON VENT: 30
BUN SERPL-MCNC: 46 MG/DL (ref 6–20)
BUN/CREAT SERPL: 22 (ref 12–20)
CALCIUM SERPL-MCNC: 8.3 MG/DL (ref 8.5–10.1)
CHLORIDE SERPL-SCNC: 101 MMOL/L (ref 97–108)
CO2 SERPL-SCNC: 25 MMOL/L (ref 21–32)
CREAT SERPL-MCNC: 2.05 MG/DL (ref 0.7–1.3)
CRP SERPL HS-MCNC: >9.5 MG/L
D DIMER PPP FEU-MCNC: 4.72 MG/L FEU (ref 0–0.65)
DIFFERENTIAL METHOD BLD: ABNORMAL
EOSINOPHIL # BLD: 0 K/UL (ref 0–0.4)
EOSINOPHIL NFR BLD: 0 % (ref 0–7)
ERYTHROCYTE [DISTWIDTH] IN BLOOD BY AUTOMATED COUNT: 13.4 % (ref 11.5–14.5)
FIBRINOGEN PPP-MCNC: 591 MG/DL (ref 200–475)
GLOBULIN SER CALC-MCNC: 4.3 G/DL (ref 2–4)
GLUCOSE SERPL-MCNC: 112 MG/DL (ref 65–100)
HCO3 BLDA-SCNC: 25 MMOL/L (ref 22–26)
HCT VFR BLD AUTO: 38.7 % (ref 36.6–50.3)
HGB BLD-MCNC: 12.2 G/DL (ref 12.1–17)
IMM GRANULOCYTES # BLD AUTO: 0.1 K/UL
IMM GRANULOCYTES NFR BLD AUTO: 1 %
LACTATE SERPL-SCNC: 1 MMOL/L (ref 0.4–2)
LYMPHOCYTES # BLD: 0.6 K/UL (ref 0.8–3.5)
LYMPHOCYTES NFR BLD: 12 % (ref 12–49)
MCH RBC QN AUTO: 27.8 PG (ref 26–34)
MCHC RBC AUTO-ENTMCNC: 31.5 G/DL (ref 30–36.5)
MCV RBC AUTO: 88.2 FL (ref 80–99)
MONOCYTES # BLD: 0.6 K/UL (ref 0–1)
MONOCYTES NFR BLD: 12 % (ref 5–13)
NEUTS SEG # BLD: 3.7 K/UL (ref 1.8–8)
NEUTS SEG NFR BLD: 75 % (ref 32–75)
NRBC # BLD: 0 K/UL (ref 0–0.01)
NRBC BLD-RTO: 0 PER 100 WBC
PCO2 BLDA: 37 MMHG (ref 35–45)
PH BLDA: 7.44 [PH] (ref 7.35–7.45)
PLATELET # BLD AUTO: 161 K/UL (ref 150–400)
PO2 BLDA: 54 MMHG (ref 80–100)
POTASSIUM SERPL-SCNC: 3.7 MMOL/L (ref 3.5–5.1)
PROT SERPL-MCNC: 6.8 G/DL (ref 6.4–8.2)
RBC # BLD AUTO: 4.39 M/UL (ref 4.1–5.7)
RBC MORPH BLD: ABNORMAL
SAO2 % BLD: 90 % (ref 92–97)
SAO2% DEVICE SAO2% SENSOR NAME: ABNORMAL
SODIUM SERPL-SCNC: 139 MMOL/L (ref 136–145)
SPECIMEN SITE: ABNORMAL
WBC # BLD AUTO: 5 K/UL (ref 4.1–11.1)

## 2022-01-04 PROCEDURE — 82803 BLOOD GASES ANY COMBINATION: CPT

## 2022-01-04 PROCEDURE — 94660 CPAP INITIATION&MGMT: CPT

## 2022-01-04 PROCEDURE — 86141 C-REACTIVE PROTEIN HS: CPT

## 2022-01-04 PROCEDURE — A9540 TC99M MAA: HCPCS

## 2022-01-04 PROCEDURE — 85025 COMPLETE CBC W/AUTO DIFF WBC: CPT

## 2022-01-04 PROCEDURE — 74011250637 HC RX REV CODE- 250/637: Performed by: STUDENT IN AN ORGANIZED HEALTH CARE EDUCATION/TRAINING PROGRAM

## 2022-01-04 PROCEDURE — 36600 WITHDRAWAL OF ARTERIAL BLOOD: CPT

## 2022-01-04 PROCEDURE — 36415 COLL VENOUS BLD VENIPUNCTURE: CPT

## 2022-01-04 PROCEDURE — 74011250637 HC RX REV CODE- 250/637: Performed by: INTERNAL MEDICINE

## 2022-01-04 PROCEDURE — 83605 ASSAY OF LACTIC ACID: CPT

## 2022-01-04 PROCEDURE — 74011250636 HC RX REV CODE- 250/636: Performed by: STUDENT IN AN ORGANIZED HEALTH CARE EDUCATION/TRAINING PROGRAM

## 2022-01-04 PROCEDURE — 83880 ASSAY OF NATRIURETIC PEPTIDE: CPT

## 2022-01-04 PROCEDURE — 65660000001 HC RM ICU INTERMED STEPDOWN

## 2022-01-04 PROCEDURE — 85379 FIBRIN DEGRADATION QUANT: CPT

## 2022-01-04 PROCEDURE — 74011000250 HC RX REV CODE- 250: Performed by: STUDENT IN AN ORGANIZED HEALTH CARE EDUCATION/TRAINING PROGRAM

## 2022-01-04 PROCEDURE — 77010033678 HC OXYGEN DAILY

## 2022-01-04 PROCEDURE — 85384 FIBRINOGEN ACTIVITY: CPT

## 2022-01-04 PROCEDURE — 71045 X-RAY EXAM CHEST 1 VIEW: CPT

## 2022-01-04 PROCEDURE — 80053 COMPREHEN METABOLIC PANEL: CPT

## 2022-01-04 RX ORDER — LANOLIN ALCOHOL/MO/W.PET/CERES
9 CREAM (GRAM) TOPICAL
Status: DISCONTINUED | OUTPATIENT
Start: 2022-01-04 | End: 2022-01-09 | Stop reason: HOSPADM

## 2022-01-04 RX ORDER — MELATONIN
2000 DAILY
Status: DISCONTINUED | OUTPATIENT
Start: 2022-01-05 | End: 2022-01-09 | Stop reason: HOSPADM

## 2022-01-04 RX ORDER — ENOXAPARIN SODIUM 150 MG/ML
1 INJECTION SUBCUTANEOUS EVERY 12 HOURS
Status: DISCONTINUED | OUTPATIENT
Start: 2022-01-05 | End: 2022-01-05

## 2022-01-04 RX ORDER — CARVEDILOL 6.25 MG/1
6.25 TABLET ORAL 2 TIMES DAILY WITH MEALS
Status: DISCONTINUED | OUTPATIENT
Start: 2022-01-05 | End: 2022-01-09

## 2022-01-04 RX ORDER — ACETAMINOPHEN 650 MG/1
650 SUPPOSITORY RECTAL
Status: DISCONTINUED | OUTPATIENT
Start: 2022-01-04 | End: 2022-01-05 | Stop reason: SDUPTHER

## 2022-01-04 RX ORDER — ZINC SULFATE 50(220)MG
1 CAPSULE ORAL DAILY
Status: DISCONTINUED | OUTPATIENT
Start: 2022-01-05 | End: 2022-01-09 | Stop reason: HOSPADM

## 2022-01-04 RX ORDER — ACETAMINOPHEN 325 MG/1
650 TABLET ORAL
Status: DISCONTINUED | OUTPATIENT
Start: 2022-01-04 | End: 2022-01-05 | Stop reason: SDUPTHER

## 2022-01-04 RX ORDER — ONDANSETRON 4 MG/1
4 TABLET, ORALLY DISINTEGRATING ORAL
Status: DISCONTINUED | OUTPATIENT
Start: 2022-01-04 | End: 2022-01-09 | Stop reason: HOSPADM

## 2022-01-04 RX ORDER — ONDANSETRON 2 MG/ML
4 INJECTION INTRAMUSCULAR; INTRAVENOUS
Status: DISCONTINUED | OUTPATIENT
Start: 2022-01-04 | End: 2022-01-05 | Stop reason: SDUPTHER

## 2022-01-04 RX ORDER — GUAIFENESIN/DEXTROMETHORPHAN 100-10MG/5
10 SYRUP ORAL
Status: DISCONTINUED | OUTPATIENT
Start: 2022-01-04 | End: 2022-01-09 | Stop reason: HOSPADM

## 2022-01-04 RX ORDER — ONDANSETRON 2 MG/ML
4 INJECTION INTRAMUSCULAR; INTRAVENOUS
Status: DISCONTINUED | OUTPATIENT
Start: 2022-01-04 | End: 2022-01-09 | Stop reason: HOSPADM

## 2022-01-04 RX ORDER — ONDANSETRON 4 MG/1
4 TABLET, ORALLY DISINTEGRATING ORAL
Status: DISCONTINUED | OUTPATIENT
Start: 2022-01-04 | End: 2022-01-05 | Stop reason: SDUPTHER

## 2022-01-04 RX ORDER — POLYETHYLENE GLYCOL 3350 17 G/17G
17 POWDER, FOR SOLUTION ORAL DAILY PRN
Status: DISCONTINUED | OUTPATIENT
Start: 2022-01-04 | End: 2022-01-09 | Stop reason: HOSPADM

## 2022-01-04 RX ORDER — POLYETHYLENE GLYCOL 3350 17 G/17G
17 POWDER, FOR SOLUTION ORAL DAILY PRN
Status: DISCONTINUED | OUTPATIENT
Start: 2022-01-04 | End: 2022-01-05 | Stop reason: SDUPTHER

## 2022-01-04 RX ORDER — BENZONATATE 100 MG/1
100 CAPSULE ORAL
Status: DISCONTINUED | OUTPATIENT
Start: 2022-01-04 | End: 2022-01-09 | Stop reason: HOSPADM

## 2022-01-04 RX ORDER — SODIUM CHLORIDE 0.9 % (FLUSH) 0.9 %
5-40 SYRINGE (ML) INJECTION AS NEEDED
Status: DISCONTINUED | OUTPATIENT
Start: 2022-01-04 | End: 2022-01-09 | Stop reason: HOSPADM

## 2022-01-04 RX ORDER — DEXAMETHASONE SODIUM PHOSPHATE 4 MG/ML
10 INJECTION, SOLUTION INTRA-ARTICULAR; INTRALESIONAL; INTRAMUSCULAR; INTRAVENOUS; SOFT TISSUE ONCE
Status: COMPLETED | OUTPATIENT
Start: 2022-01-04 | End: 2022-01-04

## 2022-01-04 RX ORDER — ASCORBIC ACID 500 MG
500 TABLET ORAL 2 TIMES DAILY
Status: DISCONTINUED | OUTPATIENT
Start: 2022-01-05 | End: 2022-01-09 | Stop reason: HOSPADM

## 2022-01-04 RX ORDER — SODIUM CHLORIDE 0.9 % (FLUSH) 0.9 %
5-40 SYRINGE (ML) INJECTION EVERY 8 HOURS
Status: DISCONTINUED | OUTPATIENT
Start: 2022-01-04 | End: 2022-01-05 | Stop reason: SDUPTHER

## 2022-01-04 RX ORDER — ACETAMINOPHEN 325 MG/1
650 TABLET ORAL
Status: DISCONTINUED | OUTPATIENT
Start: 2022-01-04 | End: 2022-01-09 | Stop reason: HOSPADM

## 2022-01-04 RX ORDER — SODIUM CHLORIDE 0.9 % (FLUSH) 0.9 %
5-40 SYRINGE (ML) INJECTION AS NEEDED
Status: DISCONTINUED | OUTPATIENT
Start: 2022-01-04 | End: 2022-01-05 | Stop reason: SDUPTHER

## 2022-01-04 RX ORDER — ACETAMINOPHEN 650 MG/1
650 SUPPOSITORY RECTAL
Status: DISCONTINUED | OUTPATIENT
Start: 2022-01-04 | End: 2022-01-09 | Stop reason: HOSPADM

## 2022-01-04 RX ORDER — LANOLIN ALCOHOL/MO/W.PET/CERES
100 CREAM (GRAM) TOPICAL DAILY
Status: DISCONTINUED | OUTPATIENT
Start: 2022-01-05 | End: 2022-01-09 | Stop reason: HOSPADM

## 2022-01-04 RX ORDER — SODIUM CHLORIDE 9 MG/ML
100 INJECTION, SOLUTION INTRAVENOUS CONTINUOUS
Status: DISCONTINUED | OUTPATIENT
Start: 2022-01-04 | End: 2022-01-05

## 2022-01-04 RX ORDER — SODIUM CHLORIDE 0.9 % (FLUSH) 0.9 %
5-40 SYRINGE (ML) INJECTION EVERY 8 HOURS
Status: DISCONTINUED | OUTPATIENT
Start: 2022-01-05 | End: 2022-01-09 | Stop reason: HOSPADM

## 2022-01-04 RX ORDER — ENOXAPARIN SODIUM 150 MG/ML
1 INJECTION SUBCUTANEOUS EVERY 12 HOURS
Status: DISCONTINUED | OUTPATIENT
Start: 2022-01-04 | End: 2022-01-04 | Stop reason: HOSPADM

## 2022-01-04 RX ORDER — CLONIDINE HYDROCHLORIDE 0.1 MG/1
0.1 TABLET ORAL
Status: DISCONTINUED | OUTPATIENT
Start: 2022-01-04 | End: 2022-01-09 | Stop reason: HOSPADM

## 2022-01-04 RX ORDER — ALBUTEROL SULFATE 90 UG/1
2 AEROSOL, METERED RESPIRATORY (INHALATION)
Status: DISCONTINUED | OUTPATIENT
Start: 2022-01-05 | End: 2022-01-09 | Stop reason: HOSPADM

## 2022-01-04 RX ADMIN — ALBUTEROL SULFATE 2 PUFF: 90 AEROSOL, METERED RESPIRATORY (INHALATION) at 09:15

## 2022-01-04 RX ADMIN — ENOXAPARIN SODIUM 120 MG: 150 INJECTION SUBCUTANEOUS at 17:55

## 2022-01-04 RX ADMIN — ENOXAPARIN SODIUM 40 MG: 100 INJECTION SUBCUTANEOUS at 09:46

## 2022-01-04 RX ADMIN — Medication 2000 UNITS: at 09:46

## 2022-01-04 RX ADMIN — Medication 100 MG: at 09:46

## 2022-01-04 RX ADMIN — SODIUM CHLORIDE, PRESERVATIVE FREE 10 ML: 5 INJECTION INTRAVENOUS at 06:57

## 2022-01-04 RX ADMIN — CARVEDILOL 6.25 MG: 3.12 TABLET, FILM COATED ORAL at 09:46

## 2022-01-04 RX ADMIN — ZINC SULFATE 220 MG (50 MG) CAPSULE 1 CAPSULE: CAPSULE at 09:46

## 2022-01-04 RX ADMIN — OXYCODONE HYDROCHLORIDE AND ACETAMINOPHEN 500 MG: 500 TABLET ORAL at 09:46

## 2022-01-04 RX ADMIN — Medication 6 MG: at 01:14

## 2022-01-04 RX ADMIN — DEXAMETHASONE SODIUM PHOSPHATE 10 MG: 4 INJECTION INTRA-ARTICULAR; INTRALESIONAL; INTRAMUSCULAR; INTRAVENOUS; SOFT TISSUE at 06:56

## 2022-01-04 RX ADMIN — FAMOTIDINE 20 MG: 20 TABLET ORAL at 09:46

## 2022-01-04 RX ADMIN — GUAIFENESIN SYRUP AND DEXTROMETHORPHAN 10 ML: 100; 10 SYRUP ORAL at 03:59

## 2022-01-04 RX ADMIN — ALBUTEROL SULFATE 2 PUFF: 90 AEROSOL, METERED RESPIRATORY (INHALATION) at 17:55

## 2022-01-04 RX ADMIN — CARVEDILOL 6.25 MG: 3.12 TABLET, FILM COATED ORAL at 17:55

## 2022-01-04 RX ADMIN — OXYCODONE HYDROCHLORIDE AND ACETAMINOPHEN 500 MG: 500 TABLET ORAL at 17:55

## 2022-01-04 RX ADMIN — Medication 4 MILLICURIE: at 13:00

## 2022-01-04 RX ADMIN — SODIUM CHLORIDE, PRESERVATIVE FREE 10 ML: 5 INJECTION INTRAVENOUS at 14:53

## 2022-01-04 RX ADMIN — BENZONATATE 100 MG: 100 CAPSULE ORAL at 04:00

## 2022-01-04 RX ADMIN — DEXAMETHASONE 6 MG: 4 TABLET ORAL at 09:46

## 2022-01-04 NOTE — PROGRESS NOTES
CPAP 6cm @ 40%  was introduced to the patient. Reluctant at first, but was able to wear the device. Will continue to monitor, the unit is set up at the bedside  and ready for use.

## 2022-01-04 NOTE — PROGRESS NOTES
Pt took his CPAP off again and refuse to wear it. Pt repositioned and up all the way in bed . HOB > 45.will monitor O2 sat. His O2 sat is 87%.

## 2022-01-04 NOTE — PROGRESS NOTES
Telemed: Covid patient refused CPAP most of night ,takes off NC frquently and takes along time to recover. Pt drops to 80's without oxygen and with any movement. Pt is shallow breathing and does not look good. He is already on 6l via NC. Supervisor and RT thinks it will be good if we transfer the patient to higher level of care. Plan: Chat reviewed and discussed with nurse. Stat ABG, stat CXR and BNP. IV dexamethasone. Heated high flow oxygen if indicated in the interim.  Inflammatory markers ordered

## 2022-01-04 NOTE — PROGRESS NOTES
0730: Report from [facility]. Pt O2 sat on monitor 91%, pt on 6LNC, all other vitals stable. RT at bedside, refusing CPAP.     0815: Spoke with Dr Liset George, aware pt needs high flow oxygen and transfer already initiated, awaiting bed. 0845: Pt up on EOB requesting to go to the bathroom. Assisted pt to bedside commode. One loose, brown/anand colored BM. Bed pad and brief changed. Pt requesting to wear his own clothes, dressed. Dry, non-productive cough and sneezing once on commode. Tachypnea, shallow breaths. O2 to 85% on 6L while on commode. Increased to 10L then assisted pt back to bed, meal tray set up, pt reports feeling better, O2 90-92%. 0930: O2 91% on EOB at 10L.     1000: Meds given. Pt requesting to sleep. O2 titrated down to 6L, O2 93%. Lights dimmed. Permission to give family update to his wife BODØ. 1015: FABIO did not answer. Left VM. Rounds complete. Pt still awaiting transfer. Will contact access center. 1030: O2 dropped to 88% on 6L, increased to 8L. O2 92%.    1200: Vitals stable. Reassessment complete, no changes. 1300: Transported pt to [facility] for perfusion scan on 6L on NRB. O2 >92% throughout. Does not keep NRB on while he is sitting on EOB or lying in bed. Educated pt on importance of wearing NRB v nasal cannula, requesting to eat lunch instead. O2 92% on 10L nasal cannula while eating. 1430: Titrating nasal cannula. Now 5L, O2 94%. Awake, Semi-Fowlers in bed.    1630: Tele leads off, O2 reading 74% on central monitor. Quickly assessed pt, sitting on EOB without his oxygen on, attempting to take his shirt off because he was hot. Nasal cannula increased to 8L. O2 recovered to 84% then 10L and pt is now 88%. Pt tachypneaic and slightly confused when asked why he took his oxygen off.     1645: Bed available at [facility] Room 407. Pt informed of transfer. Will gather pt belongings and complete EMTALA.     [phone]: Number for report is [phone], attempted to call report, left on hold > 10 minutes. Will call back. 1840: Report to Cha Mckinnon RN at [facility].    1900: Report to MANSOOR Peña transported on 6L NRB. 1920: [facility] notified of discharge from [facility].

## 2022-01-04 NOTE — PROGRESS NOTES
Pt took off his CPAP mask and refused to wear it. Pt O2 sats dropped to 80's. After lot of education and melatonin pt agreed to wear it back on.

## 2022-01-04 NOTE — PROGRESS NOTES
Bedside verbal report given by ELIZABETH Topete to Simon Agudelo 89 Pt sitting up in chair and O2 sat 93%. 2030 Pt O2 sat dropped to 85% after moving around. 2200 medicated with atarax for anxiety. 2210 Pt on CPAP and resting well.

## 2022-01-04 NOTE — PROGRESS NOTES
Pt refuses CPAP even when his oxygen saturation keeps dropping .  He also frequently taking off his oxygen and drops to 75 and takes a while to recover and his is only 89 on 6 L NC.

## 2022-01-04 NOTE — PROGRESS NOTES
Physician Progress Note      Valentín Sanchez  CSN #:                  [id number]  :                       1970  ADMIT DATE:       2021 2:21 PM  [address] [place] DATE:  RESPONDING  PROVIDER #:        Tae Koch MD [facility] MD          QUERY TEXT:    Patient admitted with hemoptysis. Noted documentation that patient \"may have component of ATN due to COVID\" in H&P and subsequent progress note dated 1/3. In order to support the diagnosis of \"ATN\", please include additional clinical indicators in your documentation. Or please document if the diagnosis of \"ATN\" has been ruled out after further study. The medical record reflects the following:  Risk Factors: Hx: HTN/ CKD  Clinical Indicators: Bun/Cr: 27/2.44 ^ 26/1.98 ^ 29/1.84 ^ 36/1.82 ^ 4/2.05; GFR: 34..^ 50 ^ 43; UA: protein: 100; Urobilinogen: >8.0. Eric Gutter Eric Gutter Noted per ED: Patient states he is just been fatigued, having some shortness of breath, continued cough and no appetite due to loss of taste. Treatment: Monitor Cr; Strict Is and Os, avoid nephrotoxic meds, renally dose meds, US renal; Check urine lytes; IVF    Thank you,    Sumi Cox  [facility]  [phone]  Options provided:  -- ATN present as evidenced by, Please document evidence.   -- ATN was ruled out  -- Other - I will add my own diagnosis  -- Disagree - Not applicable / Not valid  -- Disagree - Clinically unable to determine / Unknown  -- Refer to Clinical Documentation Reviewer    PROVIDER RESPONSE TEXT:    ATN is present as evidenced by    Query created by: Conrad Caba on 2022 8:38 AM      Electronically signed by:  Tae Koch MD [facility] MD 2022 12:10 PM

## 2022-01-05 PROBLEM — U07.1 COVID-19: Status: ACTIVE | Noted: 2022-01-05

## 2022-01-05 LAB
ALBUMIN SERPL-MCNC: 2 G/DL (ref 3.5–5)
ALBUMIN/GLOB SERPL: 0.3 {RATIO} (ref 1.1–2.2)
ALP SERPL-CCNC: 47 U/L (ref 45–117)
ALT SERPL-CCNC: ABNORMAL U/L (ref 12–78)
ANION GAP SERPL CALC-SCNC: 0 MMOL/L (ref 5–15)
ANION GAP SERPL CALC-SCNC: 8 MMOL/L (ref 5–15)
ARTERIAL PATENCY WRIST A: POSITIVE
AST SERPL-CCNC: ABNORMAL U/L (ref 15–37)
BACTERIA SPEC CULT: NORMAL
BACTERIA SPEC CULT: NORMAL
BASE EXCESS BLD CALC-SCNC: 1.3 MMOL/L
BASOPHILS # BLD: 0 K/UL (ref 0–0.1)
BASOPHILS NFR BLD: 0 % (ref 0–1)
BDY SITE: ABNORMAL
BILIRUB SERPL-MCNC: ABNORMAL MG/DL (ref 0.2–1)
BUN SERPL-MCNC: 49 MG/DL (ref 6–20)
BUN SERPL-MCNC: 52 MG/DL (ref 6–20)
BUN/CREAT SERPL: 24 (ref 12–20)
BUN/CREAT SERPL: 25 (ref 12–20)
CALCIUM SERPL-MCNC: 8.3 MG/DL (ref 8.5–10.1)
CALCIUM SERPL-MCNC: 8.4 MG/DL (ref 8.5–10.1)
CHLORIDE SERPL-SCNC: 101 MMOL/L (ref 97–108)
CHLORIDE SERPL-SCNC: 105 MMOL/L (ref 97–108)
CHLORIDE UR-SCNC: <10 MMOL/L
CO2 SERPL-SCNC: 22 MMOL/L (ref 21–32)
CO2 SERPL-SCNC: 24 MMOL/L (ref 21–32)
CREAT SERPL-MCNC: 2.03 MG/DL (ref 0.7–1.3)
CREAT SERPL-MCNC: 2.06 MG/DL (ref 0.7–1.3)
CREAT UR-MCNC: 184 MG/DL
CRP SERPL-MCNC: 3.47 MG/DL (ref 0–0.6)
DIFFERENTIAL METHOD BLD: ABNORMAL
EOSINOPHIL # BLD: 0 K/UL (ref 0–0.4)
EOSINOPHIL NFR BLD: 0 % (ref 0–7)
ERYTHROCYTE [DISTWIDTH] IN BLOOD BY AUTOMATED COUNT: 13.9 % (ref 11.5–14.5)
GAS FLOW.O2 O2 DELIVERY SYS: ABNORMAL L/MIN
GLOBULIN SER CALC-MCNC: 6.4 G/DL (ref 2–4)
GLUCOSE SERPL-MCNC: 130 MG/DL (ref 65–100)
GLUCOSE SERPL-MCNC: 137 MG/DL (ref 65–100)
HCO3 BLD-SCNC: 25.4 MMOL/L (ref 22–26)
HCT VFR BLD AUTO: 38.9 % (ref 36.6–50.3)
HGB BLD-MCNC: 12.5 G/DL (ref 12.1–17)
IMM GRANULOCYTES # BLD AUTO: 0 K/UL
IMM GRANULOCYTES NFR BLD AUTO: 0 %
LYMPHOCYTES # BLD: 0.5 K/UL (ref 0.8–3.5)
LYMPHOCYTES NFR BLD: 7 % (ref 12–49)
MCH RBC QN AUTO: 28.5 PG (ref 26–34)
MCHC RBC AUTO-ENTMCNC: 32.1 G/DL (ref 30–36.5)
MCV RBC AUTO: 88.6 FL (ref 80–99)
MONOCYTES # BLD: 0.4 K/UL (ref 0–1)
MONOCYTES NFR BLD: 6 % (ref 5–13)
NEUTS SEG # BLD: 6 K/UL (ref 1.8–8)
NEUTS SEG NFR BLD: 87 % (ref 32–75)
NRBC # BLD: 0 K/UL (ref 0–0.01)
NRBC BLD-RTO: 0 PER 100 WBC
O2/TOTAL GAS SETTING VFR VENT: 91 %
PCO2 BLD: 38 MMHG (ref 35–45)
PH BLD: 7.43 [PH] (ref 7.35–7.45)
PLATELET # BLD AUTO: 295 K/UL (ref 150–400)
PLATELET COMMENTS,PCOM: ABNORMAL
PMV BLD AUTO: 11.5 FL (ref 8.9–12.9)
PO2 BLD: 71 MMHG (ref 80–100)
POTASSIUM SERPL-SCNC: 4 MMOL/L (ref 3.5–5.1)
POTASSIUM SERPL-SCNC: ABNORMAL MMOL/L (ref 3.5–5.1)
PROT SERPL-MCNC: 8.4 G/DL (ref 6.4–8.2)
RBC # BLD AUTO: 4.39 M/UL (ref 4.1–5.7)
RBC MORPH BLD: ABNORMAL
SAO2 % BLD: 94.6 % (ref 92–97)
SERVICE CMNT-IMP: NORMAL
SERVICE CMNT-IMP: NORMAL
SODIUM SERPL-SCNC: 123 MMOL/L (ref 136–145)
SODIUM SERPL-SCNC: 137 MMOL/L (ref 136–145)
SODIUM UR-SCNC: 13 MMOL/L
SPECIMEN TYPE: ABNORMAL
UR CULT HOLD, URHOLD: NORMAL
WBC # BLD AUTO: 6.9 K/UL (ref 4.1–11.1)

## 2022-01-05 PROCEDURE — 74011250637 HC RX REV CODE- 250/637: Performed by: STUDENT IN AN ORGANIZED HEALTH CARE EDUCATION/TRAINING PROGRAM

## 2022-01-05 PROCEDURE — 77010033678 HC OXYGEN DAILY

## 2022-01-05 PROCEDURE — XW0DXM6 INTRODUCTION OF BARICITINIB INTO MOUTH AND PHARYNX, EXTERNAL APPROACH, NEW TECHNOLOGY GROUP 6: ICD-10-PCS | Performed by: STUDENT IN AN ORGANIZED HEALTH CARE EDUCATION/TRAINING PROGRAM

## 2022-01-05 PROCEDURE — 36415 COLL VENOUS BLD VENIPUNCTURE: CPT

## 2022-01-05 PROCEDURE — 77010033711 HC HIGH FLOW OXYGEN

## 2022-01-05 PROCEDURE — 82803 BLOOD GASES ANY COMBINATION: CPT

## 2022-01-05 PROCEDURE — 80053 COMPREHEN METABOLIC PANEL: CPT

## 2022-01-05 PROCEDURE — 74011000250 HC RX REV CODE- 250: Performed by: STUDENT IN AN ORGANIZED HEALTH CARE EDUCATION/TRAINING PROGRAM

## 2022-01-05 PROCEDURE — 94760 N-INVAS EAR/PLS OXIMETRY 1: CPT

## 2022-01-05 PROCEDURE — 82570 ASSAY OF URINE CREATININE: CPT

## 2022-01-05 PROCEDURE — 85025 COMPLETE CBC W/AUTO DIFF WBC: CPT

## 2022-01-05 PROCEDURE — 8E0ZXY6 ISOLATION: ICD-10-PCS | Performed by: STUDENT IN AN ORGANIZED HEALTH CARE EDUCATION/TRAINING PROGRAM

## 2022-01-05 PROCEDURE — 94640 AIRWAY INHALATION TREATMENT: CPT

## 2022-01-05 PROCEDURE — 82436 ASSAY OF URINE CHLORIDE: CPT

## 2022-01-05 PROCEDURE — 74011250636 HC RX REV CODE- 250/636: Performed by: INTERNAL MEDICINE

## 2022-01-05 PROCEDURE — 36600 WITHDRAWAL OF ARTERIAL BLOOD: CPT

## 2022-01-05 PROCEDURE — 65660000001 HC RM ICU INTERMED STEPDOWN

## 2022-01-05 PROCEDURE — 84300 ASSAY OF URINE SODIUM: CPT

## 2022-01-05 PROCEDURE — 74011250636 HC RX REV CODE- 250/636: Performed by: STUDENT IN AN ORGANIZED HEALTH CARE EDUCATION/TRAINING PROGRAM

## 2022-01-05 PROCEDURE — 86140 C-REACTIVE PROTEIN: CPT

## 2022-01-05 PROCEDURE — 94762 N-INVAS EAR/PLS OXIMTRY CONT: CPT

## 2022-01-05 RX ORDER — AMLODIPINE BESYLATE 5 MG/1
10 TABLET ORAL DAILY
Status: DISCONTINUED | OUTPATIENT
Start: 2022-01-05 | End: 2022-01-09 | Stop reason: HOSPADM

## 2022-01-05 RX ORDER — ENOXAPARIN SODIUM 150 MG/ML
111 INJECTION SUBCUTANEOUS EVERY 12 HOURS
Status: DISCONTINUED | OUTPATIENT
Start: 2022-01-05 | End: 2022-01-06

## 2022-01-05 RX ADMIN — CARVEDILOL 6.25 MG: 6.25 TABLET, FILM COATED ORAL at 09:17

## 2022-01-05 RX ADMIN — SODIUM CHLORIDE, PRESERVATIVE FREE 10 ML: 5 INJECTION INTRAVENOUS at 13:22

## 2022-01-05 RX ADMIN — METHYLPREDNISOLONE SODIUM SUCCINATE 80 MG: 125 INJECTION, POWDER, FOR SOLUTION INTRAMUSCULAR; INTRAVENOUS at 13:21

## 2022-01-05 RX ADMIN — SODIUM CHLORIDE, PRESERVATIVE FREE 10 ML: 5 INJECTION INTRAVENOUS at 01:08

## 2022-01-05 RX ADMIN — SODIUM CHLORIDE 100 ML/HR: 9 INJECTION, SOLUTION INTRAVENOUS at 01:07

## 2022-01-05 RX ADMIN — OXYCODONE HYDROCHLORIDE AND ACETAMINOPHEN 500 MG: 500 TABLET ORAL at 18:01

## 2022-01-05 RX ADMIN — AMLODIPINE BESYLATE 10 MG: 5 TABLET ORAL at 09:17

## 2022-01-05 RX ADMIN — CARVEDILOL 6.25 MG: 6.25 TABLET, FILM COATED ORAL at 18:01

## 2022-01-05 RX ADMIN — ALBUTEROL SULFATE 2 PUFF: 90 AEROSOL, METERED RESPIRATORY (INHALATION) at 11:21

## 2022-01-05 RX ADMIN — METHYLPREDNISOLONE SODIUM SUCCINATE 80 MG: 125 INJECTION, POWDER, FOR SOLUTION INTRAMUSCULAR; INTRAVENOUS at 01:07

## 2022-01-05 RX ADMIN — ENOXAPARIN SODIUM 111 MG: 150 INJECTION SUBCUTANEOUS at 19:59

## 2022-01-05 RX ADMIN — OXYCODONE HYDROCHLORIDE AND ACETAMINOPHEN 500 MG: 500 TABLET ORAL at 09:17

## 2022-01-05 RX ADMIN — Medication 2000 UNITS: at 09:17

## 2022-01-05 RX ADMIN — ENOXAPARIN SODIUM 120 MG: 150 INJECTION SUBCUTANEOUS at 07:33

## 2022-01-05 RX ADMIN — ZINC SULFATE 220 MG (50 MG) CAPSULE 1 CAPSULE: CAPSULE at 09:17

## 2022-01-05 RX ADMIN — Medication 100 MG: at 09:17

## 2022-01-05 RX ADMIN — BARICITINIB 2 MG: 2 TABLET, FILM COATED ORAL at 01:14

## 2022-01-05 RX ADMIN — ALBUTEROL SULFATE 2 PUFF: 90 AEROSOL, METERED RESPIRATORY (INHALATION) at 17:38

## 2022-01-05 RX ADMIN — SODIUM CHLORIDE, PRESERVATIVE FREE 10 ML: 5 INJECTION INTRAVENOUS at 22:00

## 2022-01-05 RX ADMIN — ALBUTEROL SULFATE 2 PUFF: 90 AEROSOL, METERED RESPIRATORY (INHALATION) at 20:24

## 2022-01-05 NOTE — PROGRESS NOTES
Lovenox Monitoring  Indication: PE  Recent Labs     01/05/22  0822 01/05/22  0345 01/04/22  0238 01/03/22  1354 01/03/22  0423   HGB  --  12.5 12.2 12.6  --    PLT  --  295 161 180  --    CREA 2.06* 2.03* 2.05*  --    < >    < > = values in this interval not displayed. Current Weight: 112 kg  Est. CrCl = 52.6 ml/min  Current Dose: 120 mg subcutaneously every 12 hours. Plan: Change to 112 mg subcutaneously every 12 hours per [facility] P&T Committee Protocol with respect to weight.   Pharmacy will continue to monitor patient daily and will make dosage adjustments based upon changing renal function    Susan Gleason PharmD, BCPS

## 2022-01-05 NOTE — PROGRESS NOTES
[facility]  Hospitalist Group                                                                                          Hospitalist Progress Note  Forest Valentine MD  Answering service: [phone] from in house phone        Date of Service:  2022  NAME:  Abbi Beltran  :  1970  MRN:  [id number]      Admission Summary:   Abbi Beltran is a 45 y.o. male with hx of htn, ckd iii who presented to ED with complaints of hemoptysis, generalized body aches, fatigue, malaise myalgias, chills and shortness of breath. He was subsequently admitted for acute hypoxic respiratory failure secondary to COVID-19 pneumonia and was placed on appropriate maximum medical therapy. Patient was noted to have increased oxygen requirement requiring mid flow to high flow oxygen and was therefore transferred to [facility] for higher level of care. He is seen and examined at bedside and has no acute complaints or concerns. Denies any symptoms while resting. Interval history / Subjective:     Pt says he feels maybe slightly better today   Reviewed different levels of oxygen, and high risk for decline given how fast he progressed. Told him to prepare for potential hospitalization for over 2 weeks. Assessment & Plan:     Acute hypoxic respiratory failure secondary to COVID-19 pneumonia  Sepsis secondary to COVID19  -Continue supplemental oxygen, barcitinib, dexamethasone, ascorbic acid, zinc, pepcid  -V/Q indeterminate for PE --> on lovenox would treat as PE at this time.  Discussed with pharmacy, need to monitor given worsening RUTH   -Will continue solumedrol for now given decadron shortage   - High flow currently at 30L, wean as tolerated   -Albuterol PRN      RUTH stage 1 on CKD III   -Baseline Cr 1.7-1.8  -Likely prerenal etiology, DC IVF today given covid status   - Nephrology consulted, suspect pt will need diuresis in the next few days   - Hold ACEi   -Trend BMP daily      Uncontrolled hypertension POA  -Continue home Coreg and amlodipine  - Hold home HCTZ, lisnopril      Body mass index is 35.04 kg/m². Morbid obesity POA  Counseled on Lifestyle modifications, AHA Diet ,weight loss strategies.     Code status: FULL  DVT prophylaxis: lovenox     Care Plan discussed with: Patient/Family  Anticipated Disposition: Home w/Family  Anticipated Discharge: Greater than 48 hours     Hospital Problems  Date Reviewed: 3/29/2021          Codes Class Noted POA    COVID-19 ICD-10-CM: U07.1  ICD-9-CM: 079.89  1/5/2022 Unknown                Review of Systems:   A comprehensive review of systems was negative except for that written in the HPI. Vital Signs:    Last 24hrs VS reviewed since prior progress note. Most recent are:  Visit Vitals  BP (!) 150/96 (BP 1 Location: Left upper arm, BP Patient Position: At rest)   Pulse 71   Temp 97.9 °F (36.6 °C)   Resp 28   SpO2 96%         Intake/Output Summary (Last 24 hours) at 1/5/2022 1208  Last data filed at 1/4/2022 2030  Gross per 24 hour   Intake --   Output 250 ml   Net -250 ml        Physical Examination:     I had a face to face encounter with this patient and independently examined them on 1/5/2022 as outlined below:          Constitutional:  No acute distress, cooperative, pleasant    ENT:  Oral mucosa moist, oropharynx benign. Resp:  Course bilaterally. No wheezing/rhonchi/rales. No accessory muscle use. High flow at 30L    CV:  Regular rhythm, normal rate, no murmurs, gallops, rubs    GI:  Soft, non distended, non tender. normoactive bowel sounds, no hepatosplenomegaly     Musculoskeletal:  No edema, warm, 2+ pulses throughout    Neurologic:  Moves all extremities.   AAOx3, CN II-XII reviewed            Data Review:    Review and/or order of clinical lab test  Review and/or order of tests in the radiology section of CPT  Review and/or order of tests in the medicine section of CPT      Labs:     Recent Labs     01/05/22  8258 01/04/22  0238   WBC 6.9 5.0   HGB 12.5 12.2   HCT 38.9 38.7    161     Recent Labs     01/05/22  0822 01/05/22  0345 01/04/22  0238    123* 139   K 4.0 HEMOLYZED,RECOLLECT REQUESTED 3.7    101 101   CO2 24 22 25   BUN 52* 49* 46*   CREA 2.06* 2.03* 2.05*   * 130* 112*   CA 8.3* 8.4* 8.3*     Recent Labs     01/05/22  0345 01/04/22  0238 01/03/22  0423   ALT HEMOLYZED,RECOLLECT REQUESTED 50 44   AP 47 49 47   TBILI HEMOLYZED,RECOLLECT REQUESTED 1.0 1.1*   TP 8.4* 6.8 6.7   ALB 2.0* 2.5* 2.5*   GLOB 6.4* 4.3* 4.2*     No results for input(s): INR, PTP, APTT, INREXT in the last 72 hours. No results for input(s): FE, TIBC, PSAT, FERR in the last 72 hours. No results found for: FOL, RBCF   Recent Labs     01/04/22  0656   PH 7.44   PCO2 37   PO2 54*     No results for input(s): CPK, CKNDX, TROIQ in the last 72 hours.     No lab exists for component: CPKMB  Lab Results   Component Value Date/Time    Cholesterol, total 199 03/08/2021 01:10 PM    HDL Cholesterol 50 03/08/2021 01:10 PM    LDL, calculated 124.2 (H) 03/08/2021 01:10 PM    Triglyceride 124 03/08/2021 01:10 PM    CHOL/HDL Ratio 4.0 03/08/2021 01:10 PM     Lab Results   Component Value Date/Time    Glucose (POC) 129 (H) 01/01/2022 01:03 PM     Lab Results   Component Value Date/Time    Color DARK YELLOW 12/31/2021 09:26 PM    Appearance CLEAR 12/31/2021 09:26 PM    Specific gravity 1.015 12/31/2021 09:26 PM    pH (UA) 6.0 12/31/2021 09:26 PM    Protein 100 (A) 12/31/2021 09:26 PM    Glucose Negative 12/31/2021 09:26 PM    Ketone 15 (A) 12/31/2021 09:26 PM    Bilirubin Negative 12/31/2021 09:26 PM    Urobilinogen >8.0 (H) 12/31/2021 09:26 PM    Nitrites Negative 12/31/2021 09:26 PM    Leukocyte Esterase Negative 12/31/2021 09:26 PM    Epithelial cells FEW 12/31/2021 09:26 PM    Bacteria Negative 12/31/2021 09:26 PM    WBC 0-4 12/31/2021 09:26 PM    RBC 5-10 12/31/2021 09:26 PM         Medications Reviewed:     Current Facility-Administered Medications   Medication Dose Route Frequency    amLODIPine (NORVASC) tablet 10 mg  10 mg Oral DAILY    albuterol (PROVENTIL HFA, VENTOLIN HFA, PROAIR HFA) inhaler 2 Puff  2 Puff Inhalation QID RT    ascorbic acid (vitamin C) (VITAMIN C) tablet 500 mg  500 mg Oral BID    benzonatate (TESSALON) capsule 100 mg  100 mg Oral TID PRN    carvediloL (COREG) tablet 6.25 mg  6.25 mg Oral BID WITH MEALS    cholecalciferol (VITAMIN D3) (1000 Units /25 mcg) tablet 2,000 Units  2,000 Units Oral DAILY    cloNIDine HCL (CATAPRES) tablet 0.1 mg  0.1 mg Oral Q6H PRN    enoxaparin (LOVENOX) injection 120 mg  1 mg/kg SubCUTAneous Q12H    guaiFENesin-dextromethorphan (ROBITUSSIN DM) 100-10 mg/5 mL syrup 10 mL  10 mL Oral Q4H PRN    melatonin tablet 9 mg  9 mg Oral QHS PRN    zinc sulfate (ZINCATE) 50 mg zinc (220 mg) capsule 1 Capsule  1 Capsule Oral DAILY    thiamine HCL (B-1) tablet 100 mg  100 mg Oral DAILY    methylPREDNISolone (PF) (Solu-MEDROL) injection 80 mg  80 mg IntraVENous Q12H    sodium chloride (NS) flush 5-40 mL  5-40 mL IntraVENous Q8H    sodium chloride (NS) flush 5-40 mL  5-40 mL IntraVENous PRN    acetaminophen (TYLENOL) suppository 650 mg  650 mg Rectal Q6H PRN    polyethylene glycol (MIRALAX) packet 17 g  17 g Oral DAILY PRN    ondansetron (ZOFRAN ODT) tablet 4 mg  4 mg Oral Q8H PRN    Or    ondansetron (ZOFRAN) injection 4 mg  4 mg IntraVENous Q6H PRN    sodium chloride (NS) flush 5-40 mL  5-40 mL IntraVENous Q8H    sodium chloride (NS) flush 5-40 mL  5-40 mL IntraVENous PRN    acetaminophen (TYLENOL) tablet 650 mg  650 mg Oral Q6H PRN    polyethylene glycol (MIRALAX) packet 17 g  17 g Oral DAILY PRN    ondansetron (ZOFRAN ODT) tablet 4 mg  4 mg Oral Q8H PRN    Or    ondansetron (ZOFRAN) injection 4 mg  4 mg IntraVENous Q6H PRN    baricitinib (OLUMIANT) tablet 2 mg  2 mg Oral Q24H     ______________________________________________________________________  EXPECTED LENGTH OF STAY: 5d 9h  ACTUAL LENGTH OF STAY:          1                 Tena Logan MD

## 2022-01-05 NOTE — PROGRESS NOTES
Transition of Care Plan   RUR: 14%    Disposition: The disposition plan is home with family assistance   F/U with PCP/Specialist     Transport: wife    Reason for Admission:  COVID 19                     RUR Score:     14%                Plan for utilizing home health: No recommendations          PCP: First and Last name:  None     Name of Practice:    Are you a current patient: Yes/No:    Approximate date of last visit:    Can you participate in a virtual visit with your PCP:                     Current Advanced Directive/Advance Care Plan: Full Code      Healthcare Decision Maker:            Transition of Care Plan:                      CRM spoke with patient, introduced self, explained role, verified demographics, and offered assistance. The patient lives with his wife, daughter, and grand baby in a home with no steps to access. The patient is independent in his ADL's/IADL's and does not have any DME. The patient uses [facility] on [address]. Care Management Interventions  PCP Verified by CM: Yes (does not have one; uses [facility])  Palliative Care Criteria Met (RRAT>21 & CHF Dx)?: No  Mode of Transport at Discharge:  Other (see comment) (wife)  Transition of Care Consult (CM Consult): Discharge Planning  MyChart Signup: No  Discharge Durable Medical Equipment: No  Health Maintenance Reviewed: Yes  Physical Therapy Consult: No  Occupational Therapy Consult: No  Speech Therapy Consult: No  Support Systems: Spouse/Significant Other,Child(matthew)  Confirm Follow Up Transport: Family  [employer] Information Provided?: No  Discharge Location  Discharge Placement: Home with family assistance    MARTY Champion

## 2022-01-05 NOTE — H&P
History & Physical    Primary Care Provider: None  Source of Information: Patient and chart review    History of Presenting Illness:   Mary Card is a 45 y.o. male with hx of htn, ckd iii who presented to ED with complaints of hemoptysis, generalized body aches, fatigue, malaise myalgias, chills and shortness of breath. He was subsequently admitted for acute hypoxic respiratory failure secondary to COVID-19 pneumonia and was placed on appropriate maximum medical therapy. Patient was noted to have increased oxygen requirement requiring mid flow to high flow oxygen and was therefore transferred to [address] [place] [address] for higher level of care. He is seen and examined at bedside and has no acute complaints or concerns. Denies any symptoms while resting. The patient denies any fever, chills, chest or abdominal pain, nausea, vomiting, cough, congestion, recent illness, palpitations, or dysuria. Review of Systems:  A comprehensive review of systems was negative except for that written in the History of Present Illness. Past Medical History:   Diagnosis Date    Hypertension       No past surgical history on file. Prior to Admission medications    Medication Sig Start Date End Date Taking? Authorizing Provider   triamterene (DYRENIUM) 50 mg capsule Take 1 Cap by mouth daily. 4/16/21   Britni Hill PA   lisinopril-hydroCHLOROthiazide (PRINZIDE, ZESTORETIC) 20-25 mg per tablet Take 1 Tab by mouth daily. 1 po daily 3/8/21   Brett Balderrama MD   amLODIPine (NORVASC) 10 mg tablet Take 1 Tab by mouth daily. 3/8/21   Brett Balderrama MD     No Known Allergies   No family history on file.      SOCIAL HISTORY:  Patient resides:  Independently X   Assisted Living    SNF    With family care       Smoking history:   None X   Former    Chronic      Alcohol history:   None X   Social    Chronic      Ambulates:   Independently X   w/cane    w/walker    w/wc CODE STATUS:  DNR    Full X   Other      Objective:     Physical Exam:     Visit Vitals  BP (!) 154/105 (BP 1 Location: Left upper arm, BP Patient Position: At rest)   Pulse 70   Temp 97.8 °F (36.6 °C)   Resp (!) 36   SpO2 (!) 89%    O2 Flow Rate (L/min): 11 l/min O2 Device: Hi flow nasal cannula (mid flow)    General:  Alert, cooperative, no distress, appears stated age. Head:  Normocephalic, without obvious abnormality, atraumatic. Eyes:  Conjunctivae/corneas clear. PERRL, EOMs intact. Nose: Nares normal. Septum midline. Mucosa normal.        Neck: Supple, symmetrical, trachea midline, no carotid bruit and no JVD. Lungs:    Coarse anterior breath sounds to auscultation bilaterally. Chest wall:  No tenderness or deformity. Heart:  Regular rate and rhythm, S1, S2 normal, no murmur, click, rub or gallop. Abdomen:   Soft, non-tender. Bowel sounds normal. No masses,  No organomegaly. Extremities: Extremities normal, atraumatic, no cyanosis or edema. Pulses: 2+ and symmetric all extremities. Skin: Skin color, texture, turgor normal. No rashes or lesions   Neurologic: CNII-XII intact. Data Review:     Recent Days:  Recent Labs     01/04/22  0238 01/03/22  1354 01/02/22  0431   WBC 5.0 5.6 5.2   HGB 12.2 12.6 13.1   HCT 38.7 39.0 40.3    180 151     Recent Labs     01/04/22  0238 01/03/22  0423 01/02/22  0431    136 135*   K 3.7 3.5 3.2*    100 98   CO2 25 25 24   * 120* 140*   BUN 46* 36* 29*   CREA 2.05* 1.82* 1.84*   CA 8.3* 8.1* 7.7*   ALB 2.5* 2.5* 2.7*   ALT 50 44 40     Recent Labs     01/04/22  0656   PH 7.44   PCO2 37   PO2 54*   HCO3 25       24 Hour Results:  Recent Results (from the past 24 hour(s))   CBC WITH AUTOMATED DIFF    Collection Time: 01/04/22  2:38 AM   Result Value Ref Range    WBC 5.0 4.1 - 11.1 K/uL    RBC 4.39 4. 10 - 5.70 M/uL    HGB 12.2 12.1 - 17.0 g/dL    HCT 38.7 36.6 - 50.3 %    MCV 88.2 80.0 - 99.0 FL    MCH 27.8 26.0 - 34.0 PG MCHC 31.5 30.0 - 36.5 g/dL    RDW 13.4 11.5 - 14.5 %    PLATELET 634 619 - 596 K/uL    NRBC 0.0 0  WBC    ABSOLUTE NRBC 0.00 0.00 - 0.01 K/uL    NEUTROPHILS 75 32 - 75 %    LYMPHOCYTES 12 12 - 49 %    MONOCYTES 12 5 - 13 %    EOSINOPHILS 0 0 - 7 %    BASOPHILS 0 0 - 1 %    IMMATURE GRANULOCYTES 1 %    ABS. NEUTROPHILS 3.7 1.8 - 8.0 K/UL    ABS. LYMPHOCYTES 0.6 (L) 0.8 - 3.5 K/UL    ABS. MONOCYTES 0.6 0.0 - 1.0 K/UL    ABS. EOSINOPHILS 0.0 0.0 - 0.4 K/UL    ABS. BASOPHILS 0.0 0.0 - 0.1 K/UL    ABS. IMM. GRANS. 0.1 K/UL    DF SMEAR SCANNED      RBC COMMENTS NORMOCYTIC, NORMOCHROMIC     METABOLIC PANEL, COMPREHENSIVE    Collection Time: 01/04/22  2:38 AM   Result Value Ref Range    Sodium 139 136 - 145 mmol/L    Potassium 3.7 3.5 - 5.1 mmol/L    Chloride 101 97 - 108 mmol/L    CO2 25 21 - 32 mmol/L    Anion gap 13 5 - 15 mmol/L    Glucose 112 (H) 65 - 100 mg/dL    BUN 46 (H) 6 - 20 MG/DL    Creatinine 2.05 (H) 0.70 - 1.30 MG/DL    BUN/Creatinine ratio 22 (H) 12 - 20      GFR est AA 42 (L) >60 ml/min/1.73m2    GFR est non-AA 34 (L) >60 ml/min/1.73m2    Calcium 8.3 (L) 8.5 - 10.1 MG/DL    Bilirubin, total 1.0 0.2 - 1.0 MG/DL    ALT (SGPT) 50 12 - 78 U/L    AST (SGOT) 60 (H) 15 - 37 U/L    Alk.  phosphatase 49 45 - 117 U/L    Protein, total 6.8 6.4 - 8.2 g/dL    Albumin 2.5 (L) 3.5 - 5.0 g/dL    Globulin 4.3 (H) 2.0 - 4.0 g/dL    A-G Ratio 0.6 (L) 1.1 - 2.2     NT-PRO BNP    Collection Time: 01/04/22  6:40 AM   Result Value Ref Range    NT pro-BNP 2,083 (H) 0 - 125 PG/ML   D DIMER    Collection Time: 01/04/22  6:40 AM   Result Value Ref Range    D-dimer 4.72 (H) 0.00 - 0.65 mg/L FEU   FIBRINOGEN    Collection Time: 01/04/22  6:40 AM   Result Value Ref Range    Fibrinogen 591 (H) 200 - 475 mg/dL   CRP, HIGH SENSITIVITY    Collection Time: 01/04/22  6:40 AM   Result Value Ref Range    CRP, High sensitivity >9.5 mg/L   LACTIC ACID    Collection Time: 01/04/22  6:40 AM   Result Value Ref Range    Lactic acid 1.0 0.4 - 2. 0 MMOL/L   BLOOD GAS, ARTERIAL    Collection Time: 01/04/22  6:56 AM   Result Value Ref Range    pH 7.44 7.35 - 7.45      PCO2 37 35 - 45 mmHg    PO2 54 (L) 80 - 100 mmHg    O2 SAT 90 (L) 92 - 97 %    BICARBONATE 25 22 - 26 mmol/L    BASE EXCESS 0.9 mmol/L    O2 METHOD NASAL CANNULA      SPONTANEOUS RATE 30      Sample source ARTERIAL      SITE LEFT RADIAL      MADAY'S TEST YES           Imaging:     Assessment:     Desire Meyer is a 45 y.o. male with hx of htn, ckd iii who is admitted for acute hypoxic respiratory failure secondary to COVID-19 pneumonia. Plan:       Acute hypoxic respiratory failure secondary to COVID-19 pneumonia  -Continue supplemental oxygen, barcitinib, dexamethasone, ascorbic acid, zinc,pepcid  -V/Q indeterminate for PE. -Therapeutic Lovenox   -Twice daily Solu-Medrol  -Albuterol every 6     Acute renal failure POA on CKD III   -Baseline Cr 1.7.   -Likely prerenal etiology  -Cautious volume resuscitation  -Trend BMP daily        Uncontrolled hypertension POA  -Continue home Coreg and amlodipine     Body mass index is 35.04 kg/m².    Morbid obesity POA  Counseled on Lifestyle modifications, AHA Diet ,weight loss strategies.            FEN/GI -  NS @ 100 ml/hr  Activity - as tolerated  DVT prophylaxis - Lovenox  GI prophylaxis -  NI  Disposition - TBD    CODE STATUS:  Full code       Signed By: Karen Chiu MD     January 4, 2022

## 2022-01-05 NOTE — PROGRESS NOTES
Bedside shift change report given to Adrienne Willoughby (oncoming nurse) by Natalie Regan RN (offgoing nurse). Report included the following information SBAR, Kardex, ED Summary, Recent Results and Cardiac Rhythm NSR/ST.

## 2022-01-05 NOTE — PROGRESS NOTES
6874  Pt was desatting to 79%. I walked into room, pt's O2 was off, he pulled his IV out, blood on floor; pt was sitting on BSC. Pt confused, asking RN what happened over night. ABGs completed, results within normal limits besides PO2 (71)    9235  Pt lethargic and growing more restless; pulled out peripheral IV, keeps pulling off o2,sats dropping to upper 70s- low 80s. Messaged on call MD which she directed me to admitting MD [facility]). Spoke with Destini, per phone readback, ordered ABGs.

## 2022-01-05 NOTE — CONSULTS
[facility]   [address] [facility], [address] [place] [address]  Phone: [phone]   [phone] NOTE     Patient: Jada Crawford MRN: [id number]  PCP: None   :     1970  Age:   45 y.o. Sex:  male      Referring physician: Norman Guerrero  Reason for consultation: 45 y.o. male with TWBSB-57 [B97.8] complicated by RUTH on CKD  Admission Date: 2022  8:21 PM  LOS: 1 day      ASSESSMENT and PLAN :   RUTH on CKD:  -Appears clinically dry on exam but given his worsening oxygenation, recommend stopping IVF  -RUTH could be secondary to Covid infection  -UA with microscopic hematuria and proteinuria. Patient reports chronic issue  -Prior renal ultrasound in 2018: Suggestive of hypertensive CKD  -Hold off on diuretics for today  -Labs in a.m. CKD 3B:  -Baseline creatinine 1.7 to 1.8 mg/dL  -Etiology: 2/2 chronic HTN  -UA with microscopic hematuria and proteinuria.  -Hold off on serologies at this time. SARS-CoV-2 infection  Bilateral pneumonia  Acute hypoxic respiratory failure  -On Solu-Medrol, baricitinib, vitamin C and zinc  -Per primary team    Chronic HTN:  -Hold lisinopril HCTZ  -Continue other meds    Care Plan discussed with: Patient     Thank you for consulting [facility] in the care of your patient. Subjective:   HPI: Jada Crawford is a 45 y.o.  male who has been admitted to the hospital for further SARS Cov 2 infection with progressive respiratory failure from bilateral pneumonia. He was transfer from [facility] due to worsening respiratory status. Nephrology has been asked to evaluate him for worsening renal function. Mr. Rama Avalos is previously unvaccinated for Covid. He reports longstanding poorly controlled hypertension and resultant CKD.   He was evaluated by nephrology several years ago when he was referred to [facility]  He was followed by Dr. Derik Brewster as PCP and had a renal ultrasound in 2018. Urine analysis showed microscopic hematuria and proteinuria on admission. Patient reports this is a chronic issue. Patient has family history of hypertension affecting both parents. He denies any family history of CKD/ESRD. Denies any prior history of kidney stones. Reports prior history of NSAID use but has been abstinent from all NSAIDs prior to this admission. Past Medical Hx:   Past Medical History:   Diagnosis Date    Hypertension         Past Surgical Hx:   No past surgical history on file. No Known Allergies    Social Hx:  reports that he has never smoked. He has never used smokeless tobacco. He reports previous alcohol use. He reports that he does not use drugs. No family history on file. Review of Systems:  A thorough twelve point review of system was performed today. Pertinent positives and negatives are mentioned in the HPI. The reminder of the ROS is negative and noncontributory. Objective:    Vitals:    Vitals:    01/05/22 0709 01/05/22 0729 01/05/22 0836 01/05/22 1000   BP:  (!) 150/96     Pulse: 79 74  71   Resp:  28     Temp:  97.9 °F (36.6 °C)     SpO2:  95% 96%      I&O's:  01/04 0701 - 01/05 0700  In: -   Out: 250 [Urine:250]  Visit Vitals  BP (!) 150/96 (BP 1 Location: Left upper arm, BP Patient Position: At rest)   Pulse 71   Temp 97.9 °F (36.6 °C)   Resp 28   SpO2 96%       Physical Exam:  General: On mid flow O2  HEENT: PERRL,  No Pallor , No Icterus  Neck: Supple,no mass palpable  Lungs : Diminished bilaterally  CVS: RRR, S1 S2 normal, No murmur   Abdomen: Soft, NT, BS +  Extremities: No edema  Skin: No rash or lesions.   MS: No joint swelling, erythema, warmth  Neurologic: non focal, AAO x 3  Psych: normal affect    Laboratory Results:    Recent Labs     01/05/22  0822 01/05/22  0345 01/04/22  0238 01/03/22  0423 01/03/22  0423    123* 139   < > 136   K 4.0 HEMOLYZED,RECOLLECT REQUESTED 3.7   < > 3.5    101 101   < > 100   CO2 24 22 25   < > 25   * 130* 112*   < > 120*   BUN 52* 49* 46*   < > 36*   CREA 2.06* 2.03* 2.05*   < > 1.82*   CA 8.3* 8.4* 8.3*   < > 8.1*   ALB  --  2.0* 2.5*  --  2.5*   ALT  --  HEMOLYZED,RECOLLECT REQUESTED 50  --  44    < > = values in this interval not displayed. Recent Labs     01/05/22  0345 01/04/22  0238 01/03/22  1354   WBC 6.9 5.0 5.6   HGB 12.5 12.2 12.6   HCT 38.9 38.7 39.0    161 180     No results found for: SDES  Lab Results   Component Value Date/Time    Culture result: MODERATE NORMAL RESPIRATORY TONY 01/01/2022 05:11 AM    Culture result: NO GROWTH 5 DAYS 12/31/2021 03:32 PM    Culture result: NO GROWTH 5 DAYS 12/31/2021 03:32 PM     Recent Results (from the past 24 hour(s))   CBC WITH AUTOMATED DIFF    Collection Time: 01/05/22  3:45 AM   Result Value Ref Range    WBC 6.9 4.1 - 11.1 K/uL    RBC 4.39 4. 10 - 5.70 M/uL    HGB 12.5 12.1 - 17.0 g/dL    HCT 38.9 36.6 - 50.3 %    MCV 88.6 80.0 - 99.0 FL    MCH 28.5 26.0 - 34.0 PG    MCHC 32.1 30.0 - 36.5 g/dL    RDW 13.9 11.5 - 14.5 %    PLATELET 891 461 - 495 K/uL    MPV 11.5 8.9 - 12.9 FL    NRBC 0.0 0  WBC    ABSOLUTE NRBC 0.00 0.00 - 0.01 K/uL    NEUTROPHILS 87 (H) 32 - 75 %    LYMPHOCYTES 7 (L) 12 - 49 %    MONOCYTES 6 5 - 13 %    EOSINOPHILS 0 0 - 7 %    BASOPHILS 0 0 - 1 %    IMMATURE GRANULOCYTES 0 %    ABS. NEUTROPHILS 6.0 1.8 - 8.0 K/UL    ABS. LYMPHOCYTES 0.5 (L) 0.8 - 3.5 K/UL    ABS. MONOCYTES 0.4 0.0 - 1.0 K/UL    ABS. EOSINOPHILS 0.0 0.0 - 0.4 K/UL    ABS. BASOPHILS 0.0 0.0 - 0.1 K/UL    ABS. IMM.  GRANS. 0.0 K/UL    DF MANUAL      PLATELET COMMENTS Large Platelets      RBC COMMENTS NORMOCYTIC, NORMOCHROMIC     METABOLIC PANEL, COMPREHENSIVE    Collection Time: 01/05/22  3:45 AM   Result Value Ref Range    Sodium 123 (L) 136 - 145 mmol/L    Potassium HEMOLYZED,RECOLLECT REQUESTED 3.5 - 5.1 mmol/L    Chloride 101 97 - 108 mmol/L    CO2 22 21 - 32 mmol/L    Anion gap 0 (L) 5 - 15 mmol/L    Glucose 130 (H) 65 - 100 mg/dL    BUN 49 (H) 6 - 20 MG/DL    Creatinine 2.03 (H) 0.70 - 1.30 MG/DL    BUN/Creatinine ratio 24 (H) 12 - 20      GFR est AA 42 (L) >60 ml/min/1.73m2    GFR est non-AA 35 (L) >60 ml/min/1.73m2    Calcium 8.4 (L) 8.5 - 10.1 MG/DL    Bilirubin, total HEMOLYZED,RECOLLECT REQUESTED 0.2 - 1.0 MG/DL    ALT (SGPT) HEMOLYZED,RECOLLECT REQUESTED 12 - 78 U/L    AST (SGOT) HEMOLYZED,RECOLLECT REQUESTED 15 - 37 U/L    Alk.  phosphatase 47 45 - 117 U/L    Protein, total 8.4 (H) 6.4 - 8.2 g/dL    Albumin 2.0 (L) 3.5 - 5.0 g/dL    Globulin 6.4 (H) 2.0 - 4.0 g/dL    A-G Ratio 0.3 (L) 1.1 - 2.2     POC G3 - PUL    Collection Time: 01/05/22  4:36 AM   Result Value Ref Range    FIO2 (POC) 91 %    pH (POC) 7.43 7.35 - 7.45      pCO2 (POC) 38.0 35.0 - 45.0 MMHG    pO2 (POC) 71 (L) 80 - 100 MMHG    HCO3 (POC) 25.4 22 - 26 MMOL/L    sO2 (POC) 94.6 92 - 97 %    Base excess (POC) 1.3 mmol/L    Site LEFT RADIAL      Device: High Flow Nasal Cannula      Allens test (POC) Positive      Specimen type (POC) ARTERIAL     METABOLIC PANEL, BASIC    Collection Time: 01/05/22  8:22 AM   Result Value Ref Range    Sodium 137 136 - 145 mmol/L    Potassium 4.0 3.5 - 5.1 mmol/L    Chloride 105 97 - 108 mmol/L    CO2 24 21 - 32 mmol/L    Anion gap 8 5 - 15 mmol/L    Glucose 137 (H) 65 - 100 mg/dL    BUN 52 (H) 6 - 20 MG/DL    Creatinine 2.06 (H) 0.70 - 1.30 MG/DL    BUN/Creatinine ratio 25 (H) 12 - 20      GFR est AA 41 (L) >60 ml/min/1.73m2    GFR est non-AA 34 (L) >60 ml/min/1.73m2    Calcium 8.3 (L) 8.5 - 10.1 MG/DL         Urine dipstick:   Lab Results   Component Value Date/Time    Color DARK YELLOW 12/31/2021 09:26 PM    Appearance CLEAR 12/31/2021 09:26 PM    Specific gravity 1.015 12/31/2021 09:26 PM    pH (UA) 6.0 12/31/2021 09:26 PM    Protein 100 (A) 12/31/2021 09:26 PM    Glucose Negative 12/31/2021 09:26 PM    Ketone 15 (A) 12/31/2021 09:26 PM    Bilirubin Negative 12/31/2021 09:26 PM    Urobilinogen >8.0 (H) 12/31/2021 09:26 PM    Nitrites Negative 12/31/2021 09:26 PM    Leukocyte Esterase Negative 12/31/2021 09:26 PM    Epithelial cells FEW 12/31/2021 09:26 PM    Bacteria Negative 12/31/2021 09:26 PM    WBC 0-4 12/31/2021 09:26 PM    RBC 5-10 12/31/2021 09:26 PM       I have reviewed the following: All pertinent labs, microbiology data, radiology imaging for my assessment     Medications list Personally Reviewed   [x]      Yes     []               No       Medications:  Prior to Admission medications    Medication Sig Start Date End Date Taking? Authorizing Provider   triamterene (DYRENIUM) 50 mg capsule Take 1 Cap by mouth daily. 4/16/21  Yes Leticia Hill PA   amLODIPine (NORVASC) 10 mg tablet Take 1 Tab by mouth daily. 3/8/21  Yes Sole Richard MD   lisinopril-hydroCHLOROthiazide (PRINZIDE, ZESTORETIC) 20-25 mg per tablet Take 1 Tab by mouth daily. 1 po daily 3/8/21   Sole Richard MD        Thank you for allowing us to participate in the care of this patient. We will follow patient. Please dont hesitate to call with any questions    Sandee Lopez MD  [facility]   [address] [facility] [address]  Phone - [phone]   Fax - [phone]  www. [facility] full weight-bearing

## 2022-01-05 NOTE — PROGRESS NOTES
Problem: Breathing Pattern - Ineffective  Goal: Ability to achieve and maintain a regular respiratory rate  1/5/2022 0917 by Misael Nick, RN  Note: Pt breathing labored, tachypneic, requiring high flow O2.  1/5/2022 0912 by Misael Nick, RN  Outcome: Not Progressing Towards Goal     Problem: Fatigue  Goal: Verbalize increase energy and improved vitality  Outcome: Not Progressing Towards Goal  Note: Pt incredibly fatigued; lethargic, falls into a deep sleep unaware of pullling at lines and O2     Problem: Falls - Risk of  Goal: *Absence of Falls  Description: Document [facility] Fall Risk and appropriate interventions in the flowsheet.   Outcome: Progressing Towards Goal  Note: Fall Risk Interventions:  Mobility Interventions: Assess mobility with egress test,Communicate number of staff needed for ambulation/transfer,Patient to call before getting OOB         Medication Interventions: Assess postural VS orthostatic hypotension,Patient to call before getting OOB,Teach patient to arise slowly    Elimination Interventions: Call light in reach,Urinal in reach,Patient to call for help with toileting needs    History of Falls Interventions: Bed/chair exit alarm

## 2022-01-05 NOTE — PROGRESS NOTES
Bedside shift change report given to Luis Miguel Tolliver RN (oncoming nurse) by Yue Kumari RN (offgoing nurse). Report included the following information SBAR, Kardex, ED Summary, Intake/Output, MAR, Accordion, Recent Results, Med Rec Status, Cardiac Rhythm NSR and Alarm Parameters . Updated oncoming RN about pulling at lines and O2.

## 2022-01-05 NOTE — ACP (ADVANCE CARE PLANNING)
Advance Care Planning     Advance Care Planning (ACP) Physician/NP/PA Conversation      Date of Conversation: 1/4/2022  Conducted with: Patient with Decision Making Capacity    Healthcare Decision Maker:   No healthcare decision makers have been documented. Click here to complete [address] including selection of the Healthcare Decision Maker Relationship (ie \"Primary\")    Today we documented Decision Maker(s) consistent with Legal Next of Kin hierarchy. Care Preferences:    Hospitalization: \"If your health worsens and it becomes clear that your chance of recovery is unlikely, what would be your preference regarding hospitalization? \"  The patient would prefer hospitalization. Ventilation: \"If you were unable to breathe on your own and your chance of recovery was unlikely, what would be your preference about the use of a ventilator (breathing machine) if it was available to you? \"   The patient would desire the use of a ventilator. Resuscitation: \"In the event your heart stopped as a result of an underlying serious health condition, would you want attempts to be made to restart your heart, or would you prefer a natural death? \"   Yes, attempt to resuscitate. Additional topics discussed: treatment goals, benefit/burden of treatment options, artificial nutrition, ventilation preferences, hospitalization preferences and resuscitation preferences    Had long discussion about COVID19, and all the different levels of oxygen available in the hospital. Discussed that he is currently on 30LHFNC, and although there is room to increase, it is a large amount of oxygen. He is at high risk for further decompensation and death. Reviewed code status, difference between full code and DNR. Reviewed poor chance of success of CPR in the setting of [address].  Pt will think about this, but for now is FULL CODE    Conversation Outcomes / Follow-Up Plan:   ACP in process - information provided, considering goals and options  Reviewed DNR/DNI and patient elects Full Code (Attempt Resuscitation)     Length of Voluntary ACP Conversation in minutes:  20 minutes    Jazlyn Best MD

## 2022-01-06 LAB
ANION GAP SERPL CALC-SCNC: 9 MMOL/L (ref 5–15)
BASOPHILS # BLD: 0 K/UL (ref 0–0.1)
BASOPHILS NFR BLD: 0 % (ref 0–1)
BUN SERPL-MCNC: 55 MG/DL (ref 6–20)
BUN/CREAT SERPL: 25 (ref 12–20)
CALCIUM SERPL-MCNC: 8.2 MG/DL (ref 8.5–10.1)
CHLORIDE SERPL-SCNC: 103 MMOL/L (ref 97–108)
CO2 SERPL-SCNC: 24 MMOL/L (ref 21–32)
CREAT SERPL-MCNC: 2.17 MG/DL (ref 0.7–1.3)
DIFFERENTIAL METHOD BLD: ABNORMAL
EOSINOPHIL # BLD: 0 K/UL (ref 0–0.4)
EOSINOPHIL NFR BLD: 0 % (ref 0–7)
ERYTHROCYTE [DISTWIDTH] IN BLOOD BY AUTOMATED COUNT: 13.1 % (ref 11.5–14.5)
GLUCOSE SERPL-MCNC: 119 MG/DL (ref 65–100)
HCT VFR BLD AUTO: 38.2 % (ref 36.6–50.3)
HGB BLD-MCNC: 12 G/DL (ref 12.1–17)
IMM GRANULOCYTES # BLD AUTO: 0 K/UL
IMM GRANULOCYTES NFR BLD AUTO: 0 %
LYMPHOCYTES # BLD: 1.7 K/UL (ref 0.8–3.5)
LYMPHOCYTES NFR BLD: 19 % (ref 12–49)
MCH RBC QN AUTO: 28.4 PG (ref 26–34)
MCHC RBC AUTO-ENTMCNC: 31.4 G/DL (ref 30–36.5)
MCV RBC AUTO: 90.3 FL (ref 80–99)
MONOCYTES # BLD: 0.6 K/UL (ref 0–1)
MONOCYTES NFR BLD: 7 % (ref 5–13)
NEUTS SEG # BLD: 6.9 K/UL (ref 1.8–8)
NEUTS SEG NFR BLD: 74 % (ref 32–75)
NRBC # BLD: 0 K/UL (ref 0–0.01)
NRBC BLD-RTO: 0 PER 100 WBC
PLATELET # BLD AUTO: 300 K/UL (ref 150–400)
PLATELET COMMENTS,PCOM: ABNORMAL
PMV BLD AUTO: 11.5 FL (ref 8.9–12.9)
POTASSIUM SERPL-SCNC: 4.1 MMOL/L (ref 3.5–5.1)
RBC # BLD AUTO: 4.23 M/UL (ref 4.1–5.7)
RBC MORPH BLD: ABNORMAL
SODIUM SERPL-SCNC: 136 MMOL/L (ref 136–145)
WBC # BLD AUTO: 9.2 K/UL (ref 4.1–11.1)
WBC MORPH BLD: ABNORMAL

## 2022-01-06 PROCEDURE — 74011000250 HC RX REV CODE- 250: Performed by: INTERNAL MEDICINE

## 2022-01-06 PROCEDURE — 74011250637 HC RX REV CODE- 250/637: Performed by: STUDENT IN AN ORGANIZED HEALTH CARE EDUCATION/TRAINING PROGRAM

## 2022-01-06 PROCEDURE — 74011000250 HC RX REV CODE- 250: Performed by: STUDENT IN AN ORGANIZED HEALTH CARE EDUCATION/TRAINING PROGRAM

## 2022-01-06 PROCEDURE — 77010033678 HC OXYGEN DAILY

## 2022-01-06 PROCEDURE — 74011250636 HC RX REV CODE- 250/636: Performed by: HOSPITALIST

## 2022-01-06 PROCEDURE — 36415 COLL VENOUS BLD VENIPUNCTURE: CPT

## 2022-01-06 PROCEDURE — 74011250636 HC RX REV CODE- 250/636: Performed by: STUDENT IN AN ORGANIZED HEALTH CARE EDUCATION/TRAINING PROGRAM

## 2022-01-06 PROCEDURE — 65660000001 HC RM ICU INTERMED STEPDOWN

## 2022-01-06 PROCEDURE — 80048 BASIC METABOLIC PNL TOTAL CA: CPT

## 2022-01-06 PROCEDURE — 74011250636 HC RX REV CODE- 250/636: Performed by: INTERNAL MEDICINE

## 2022-01-06 PROCEDURE — 94664 DEMO&/EVAL PT USE INHALER: CPT

## 2022-01-06 PROCEDURE — 94640 AIRWAY INHALATION TREATMENT: CPT

## 2022-01-06 PROCEDURE — 77010033711 HC HIGH FLOW OXYGEN

## 2022-01-06 PROCEDURE — 85025 COMPLETE CBC W/AUTO DIFF WBC: CPT

## 2022-01-06 PROCEDURE — 74011250637 HC RX REV CODE- 250/637: Performed by: INTERNAL MEDICINE

## 2022-01-06 RX ORDER — BUMETANIDE 0.25 MG/ML
1 INJECTION INTRAMUSCULAR; INTRAVENOUS DAILY
Status: DISCONTINUED | OUTPATIENT
Start: 2022-01-06 | End: 2022-01-09 | Stop reason: HOSPADM

## 2022-01-06 RX ORDER — ENOXAPARIN SODIUM 100 MG/ML
90 INJECTION SUBCUTANEOUS EVERY 12 HOURS
Status: DISCONTINUED | OUTPATIENT
Start: 2022-01-06 | End: 2022-01-09

## 2022-01-06 RX ADMIN — AMLODIPINE BESYLATE 10 MG: 5 TABLET ORAL at 08:20

## 2022-01-06 RX ADMIN — CARVEDILOL 6.25 MG: 6.25 TABLET, FILM COATED ORAL at 08:20

## 2022-01-06 RX ADMIN — Medication 100 MG: at 08:20

## 2022-01-06 RX ADMIN — CARVEDILOL 6.25 MG: 6.25 TABLET, FILM COATED ORAL at 17:04

## 2022-01-06 RX ADMIN — METHYLPREDNISOLONE SODIUM SUCCINATE 80 MG: 125 INJECTION, POWDER, FOR SOLUTION INTRAMUSCULAR; INTRAVENOUS at 00:21

## 2022-01-06 RX ADMIN — ALBUTEROL SULFATE 2 PUFF: 90 AEROSOL, METERED RESPIRATORY (INHALATION) at 08:52

## 2022-01-06 RX ADMIN — SODIUM CHLORIDE, PRESERVATIVE FREE 10 ML: 5 INJECTION INTRAVENOUS at 21:46

## 2022-01-06 RX ADMIN — BARICITINIB 2 MG: 2 TABLET, FILM COATED ORAL at 00:21

## 2022-01-06 RX ADMIN — ALBUTEROL SULFATE 2 PUFF: 90 AEROSOL, METERED RESPIRATORY (INHALATION) at 21:05

## 2022-01-06 RX ADMIN — BUMETANIDE 1 MG: 0.25 INJECTION INTRAMUSCULAR; INTRAVENOUS at 11:21

## 2022-01-06 RX ADMIN — SODIUM CHLORIDE, PRESERVATIVE FREE 10 ML: 5 INJECTION INTRAVENOUS at 17:05

## 2022-01-06 RX ADMIN — BENZONATATE 100 MG: 100 CAPSULE ORAL at 07:45

## 2022-01-06 RX ADMIN — ENOXAPARIN SODIUM 90 MG: 100 INJECTION SUBCUTANEOUS at 21:45

## 2022-01-06 RX ADMIN — GUAIFENESIN AND DEXTROMETHORPHAN 10 ML: 100; 10 SYRUP ORAL at 12:27

## 2022-01-06 RX ADMIN — Medication 2000 UNITS: at 08:20

## 2022-01-06 RX ADMIN — ENOXAPARIN SODIUM 111 MG: 150 INJECTION SUBCUTANEOUS at 07:44

## 2022-01-06 RX ADMIN — OXYCODONE HYDROCHLORIDE AND ACETAMINOPHEN 500 MG: 500 TABLET ORAL at 08:20

## 2022-01-06 RX ADMIN — OXYCODONE HYDROCHLORIDE AND ACETAMINOPHEN 500 MG: 500 TABLET ORAL at 17:04

## 2022-01-06 RX ADMIN — SODIUM CHLORIDE, PRESERVATIVE FREE 10 ML: 5 INJECTION INTRAVENOUS at 06:00

## 2022-01-06 RX ADMIN — ALBUTEROL SULFATE 2 PUFF: 90 AEROSOL, METERED RESPIRATORY (INHALATION) at 14:39

## 2022-01-06 RX ADMIN — ZINC SULFATE 220 MG (50 MG) CAPSULE 1 CAPSULE: CAPSULE at 08:20

## 2022-01-06 RX ADMIN — METHYLPREDNISOLONE SODIUM SUCCINATE 80 MG: 125 INJECTION, POWDER, FOR SOLUTION INTRAMUSCULAR; INTRAVENOUS at 11:21

## 2022-01-06 NOTE — PROGRESS NOTES
[address]  Hospitalist Group                                                                                          Hospitalist Progress Note  James Mckeon MD  Answering service: [phone] OR 36 from in house phone        Date of Service:  2022  NAME:  Susie Villa  :  1970  MRN:  [id number]      Admission Summary:   Susie Villa is a 45 y.o. male with hx of htn, ckd iii who presented to ED with complaints of hemoptysis, generalized body aches, fatigue, malaise myalgias, chills and shortness of breath. He was subsequently admitted for acute hypoxic respiratory failure secondary to COVID-19 pneumonia and was placed on appropriate maximum medical therapy. Patient was noted to have increased oxygen requirement requiring mid flow to high flow oxygen and was therefore transferred to [facility] for higher level of care. He is seen and examined at bedside and has no acute complaints or concerns. Denies any symptoms while resting. Interval history / Subjective:   Patient seen and examined on 25 L high flow nasal cannula  Patient specifically has no complaints discussed with RN at bedside     Assessment & Plan:     Acute hypoxic respiratory failure secondary to COVID-19 pneumonia  Sepsis secondary to COVID19  -Continue supplemental oxygen, barcitinib, dexamethasone, ascorbic acid, zinc, pepcid  -V/Q indeterminate for PE --> on lovenox would treat as PE at this time.  Discussed with pharmacy, need to monitor given worsening RUTH   -Will continue solumedrol for now given decadron shortage   - High flow currently at 25L, wean as tolerated   -Albuterol PRN      RUTH stage 1 on CKD III   -Baseline Cr 1.7-1.8  -Likely prerenal etiology, DC IVF today given covid status   - Nephrology consulted, suspect pt will need diuresis in the next few days   - Hold ACEi   -Trend BMP daily  -On Bumex 1 mg daily      Uncontrolled hypertension POA  -Continue home Coreg and amlodipine  - Hold home HCTZ, lisnopril      Body mass index is 35.04 kg/m². Morbid obesity POA  Counseled on Lifestyle modifications, AHA Diet ,weight loss strategies.     Code status: Full  DVT prophylaxis: lovenox fully anticoagulated for PE    Care Plan discussed with: Patient/Family  Anticipated Disposition: Home w/Family  Anticipated Discharge: Greater than 48 hours     Hospital Problems  Date Reviewed: 3/29/2021          Codes Class Noted POA    COVID-19 ICD-10-CM: U07.1  ICD-9-CM: 079.89  1/5/2022 Unknown                Review of Systems:   A comprehensive review of systems was negative except for that written in the HPI. Vital Signs:    Last 24hrs VS reviewed since prior progress note. Most recent are:  Visit Vitals  BP (!) 143/88   Pulse 79   Temp 98 °F (36.7 °C)   Resp 20   Wt 94.4 kg (208 lb 1.8 oz)   SpO2 100%   BMI 29.86 kg/m²         Intake/Output Summary (Last 24 hours) at 1/6/2022 1214  Last data filed at 1/6/2022 0400  Gross per 24 hour   Intake --   Output 600 ml   Net -600 ml        Physical Examination:     I had a face to face encounter with this patient and independently examined them on 1/6/2022 as outlined below:          Constitutional:  No acute distress, cooperative, pleasant    ENT:  Oral mucosa moist, oropharynx benign. Resp:  Course bilaterally. No wheezing/rhonchi/rales. No accessory muscle use. High flow at 30L    CV:  Regular rhythm, normal rate, no murmurs, gallops, rubs    GI:  Soft, non distended, non tender. normoactive bowel sounds, no hepatosplenomegaly     Musculoskeletal:  No edema, warm, 2+ pulses throughout    Neurologic:  Moves all extremities.   AAOx3, CN II-XII reviewed            Data Review:    Review and/or order of clinical lab test  Review and/or order of tests in the radiology section of CPT  Review and/or order of tests in the medicine section of CPT      Labs:     Recent Labs     01/06/22  0054 01/05/22  0345   WBC 9.2 6.9   HGB 12.0* 12.5   HCT 38.2 38.9    295     Recent Labs     01/06/22  0054 01/05/22  0822 01/05/22  0345    137 123*   K 4.1 4.0 HEMOLYZED,RECOLLECT REQUESTED    105 101   CO2 24 24 22   BUN 55* 52* 49*   CREA 2.17* 2.06* 2.03*   * 137* 130*   CA 8.2* 8.3* 8.4*     Recent Labs     01/05/22  0345 01/04/22  0238   ALT HEMOLYZED,RECOLLECT REQUESTED 50   AP 47 49   TBILI HEMOLYZED,RECOLLECT REQUESTED 1.0   TP 8.4* 6.8   ALB 2.0* 2.5*   GLOB 6.4* 4.3*     No results for input(s): INR, PTP, APTT, INREXT, INREXT in the last 72 hours. No results for input(s): FE, TIBC, PSAT, FERR in the last 72 hours. No results found for: FOL, RBCF   Recent Labs     01/04/22  0656   PH 7.44   PCO2 37   PO2 54*     No results for input(s): CPK, CKNDX, TROIQ in the last 72 hours.     No lab exists for component: CPKMB  Lab Results   Component Value Date/Time    Cholesterol, total 199 03/08/2021 01:10 PM    HDL Cholesterol 50 03/08/2021 01:10 PM    LDL, calculated 124.2 (H) 03/08/2021 01:10 PM    Triglyceride 124 03/08/2021 01:10 PM    CHOL/HDL Ratio 4.0 03/08/2021 01:10 PM     Lab Results   Component Value Date/Time    Glucose (POC) 129 (H) 01/01/2022 01:03 PM     Lab Results   Component Value Date/Time    Color DARK YELLOW 12/31/2021 09:26 PM    Appearance CLEAR 12/31/2021 09:26 PM    Specific gravity 1.015 12/31/2021 09:26 PM    pH (UA) 6.0 12/31/2021 09:26 PM    Protein 100 (A) 12/31/2021 09:26 PM    Glucose Negative 12/31/2021 09:26 PM    Ketone 15 (A) 12/31/2021 09:26 PM    Bilirubin Negative 12/31/2021 09:26 PM    Urobilinogen >8.0 (H) 12/31/2021 09:26 PM    Nitrites Negative 12/31/2021 09:26 PM    Leukocyte Esterase Negative 12/31/2021 09:26 PM    Epithelial cells FEW 12/31/2021 09:26 PM    Bacteria Negative 12/31/2021 09:26 PM    WBC 0-4 12/31/2021 09:26 PM    RBC 5-10 12/31/2021 09:26 PM         Medications Reviewed:     Current Facility-Administered Medications   Medication Dose Route Frequency    bumetanide (BUMEX) injection 1 mg  1 mg IntraVENous DAILY    amLODIPine (NORVASC) tablet 10 mg  10 mg Oral DAILY    enoxaparin (LOVENOX) injection 111 mg  111 mg SubCUTAneous Q12H    sodium chloride (OCEAN) 0.65 % nasal squeeze bottle 2 Spray  2 Spray Both Nostrils Q2H PRN    albuterol (PROVENTIL HFA, VENTOLIN HFA, PROAIR HFA) inhaler 2 Puff  2 Puff Inhalation QID RT    ascorbic acid (vitamin C) (VITAMIN C) tablet 500 mg  500 mg Oral BID    benzonatate (TESSALON) capsule 100 mg  100 mg Oral TID PRN    carvediloL (COREG) tablet 6.25 mg  6.25 mg Oral BID WITH MEALS    cholecalciferol (VITAMIN D3) (1000 Units /25 mcg) tablet 2,000 Units  2,000 Units Oral DAILY    cloNIDine HCL (CATAPRES) tablet 0.1 mg  0.1 mg Oral Q6H PRN    guaiFENesin-dextromethorphan (ROBITUSSIN DM) 100-10 mg/5 mL syrup 10 mL  10 mL Oral Q4H PRN    melatonin tablet 9 mg  9 mg Oral QHS PRN    zinc sulfate (ZINCATE) 50 mg zinc (220 mg) capsule 1 Capsule  1 Capsule Oral DAILY    thiamine HCL (B-1) tablet 100 mg  100 mg Oral DAILY    methylPREDNISolone (PF) (Solu-MEDROL) injection 80 mg  80 mg IntraVENous Q12H    sodium chloride (NS) flush 5-40 mL  5-40 mL IntraVENous Q8H    sodium chloride (NS) flush 5-40 mL  5-40 mL IntraVENous PRN    acetaminophen (TYLENOL) suppository 650 mg  650 mg Rectal Q6H PRN    polyethylene glycol (MIRALAX) packet 17 g  17 g Oral DAILY PRN    ondansetron (ZOFRAN ODT) tablet 4 mg  4 mg Oral Q8H PRN    Or    ondansetron (ZOFRAN) injection 4 mg  4 mg IntraVENous Q6H PRN    acetaminophen (TYLENOL) tablet 650 mg  650 mg Oral Q6H PRN    baricitinib (OLUMIANT) tablet 2 mg  2 mg Oral Q24H     ______________________________________________________________________  EXPECTED LENGTH OF STAY: 5d 9h  ACTUAL LENGTH OF STAY:          2                 Julianna Mary MD

## 2022-01-06 NOTE — PROGRESS NOTES
Problem: Breathing Pattern - Ineffective  Goal: Ability to achieve and maintain a regular respiratory rate  Outcome: Not Progressing Towards Goal  Note: Pt not grasping understanding of Covid diagnosis; sometimes pulls off high flow, then desats to low 80s. Needs more education on severity of respiratory failure.

## 2022-01-06 NOTE — PROGRESS NOTES
[facility]   [address] [facility], [address] [place] [address]  Phone: [phone]   [phone]    [email or web address]     Nephrology Progress Note    Patient Name : Pierre Morales      : 1970     MRN : [id number]  Date of Admission : 2022  Date of Servive : 22    CC:  Follow up for *RUTH       Assessment and Plan   RUTH on CKD:  -2/2 COVID infection   - Non oliguric   - renal US : deferred due to covid isolation   - start Bumex 1 mg IV daily   - labs daily      CKD 3B:  -Baseline creatinine 1.7 to 1.8 mg/dL  -Etiology: 2/2 chronic HTN  -UA with microscopic hematuria and proteinuria.  -Hold off on serologies at this time.     SARS-CoV-2 infection  Bilateral pneumonia  Acute hypoxic respiratory failure  -On Solu-Medrol, baricitinib, vitamin C and zinc  -Per primary team     Chronic HTN:  -Hold lisinopril HCTZ  -Continue other meds     Care Plan discussed with: Patient     Interval History:  Seen and examined. Remains on mid flow O2, saturating well. Voiding well. Cr relatively stable   Denies any new sx     Review of Systems: A comprehensive review of systems was negative except for that written in the HPI.     Current Medications:   Current Facility-Administered Medications   Medication Dose Route Frequency    bumetanide (BUMEX) injection 1 mg  1 mg IntraVENous DAILY    amLODIPine (NORVASC) tablet 10 mg  10 mg Oral DAILY    enoxaparin (LOVENOX) injection 111 mg  111 mg SubCUTAneous Q12H    sodium chloride (OCEAN) 0.65 % nasal squeeze bottle 2 Spray  2 Spray Both Nostrils Q2H PRN    albuterol (PROVENTIL HFA, VENTOLIN HFA, PROAIR HFA) inhaler 2 Puff  2 Puff Inhalation QID RT    ascorbic acid (vitamin C) (VITAMIN C) tablet 500 mg  500 mg Oral BID    benzonatate (TESSALON) capsule 100 mg  100 mg Oral TID PRN    carvediloL (COREG) tablet 6.25 mg  6.25 mg Oral BID WITH MEALS    cholecalciferol (VITAMIN D3) (1000 Units /25 mcg) tablet 2,000 Units  2,000 Units Oral DAILY    cloNIDine HCL (CATAPRES) tablet 0.1 mg  0.1 mg Oral Q6H PRN    guaiFENesin-dextromethorphan (ROBITUSSIN DM) 100-10 mg/5 mL syrup 10 mL  10 mL Oral Q4H PRN    melatonin tablet 9 mg  9 mg Oral QHS PRN    zinc sulfate (ZINCATE) 50 mg zinc (220 mg) capsule 1 Capsule  1 Capsule Oral DAILY    thiamine HCL (B-1) tablet 100 mg  100 mg Oral DAILY    methylPREDNISolone (PF) (Solu-MEDROL) injection 80 mg  80 mg IntraVENous Q12H    sodium chloride (NS) flush 5-40 mL  5-40 mL IntraVENous Q8H    sodium chloride (NS) flush 5-40 mL  5-40 mL IntraVENous PRN    acetaminophen (TYLENOL) suppository 650 mg  650 mg Rectal Q6H PRN    polyethylene glycol (MIRALAX) packet 17 g  17 g Oral DAILY PRN    ondansetron (ZOFRAN ODT) tablet 4 mg  4 mg Oral Q8H PRN    Or    ondansetron (ZOFRAN) injection 4 mg  4 mg IntraVENous Q6H PRN    acetaminophen (TYLENOL) tablet 650 mg  650 mg Oral Q6H PRN    baricitinib (OLUMIANT) tablet 2 mg  2 mg Oral Q24H      No Known Allergies    Objective:  Vitals:    Vitals:    01/06/22 0400 01/06/22 0558 01/06/22 0658 01/06/22 0852   BP:   (!) 143/104    Pulse: 72 62 73    Resp:   27    Temp:       SpO2:   99% 98%   Weight:         Intake and Output:  No intake/output data recorded. 01/04 1901 - 01/06 0700  In: -   Out: 950 [Urine:950]    Physical Examination:  General: On mid flow O2  HEENT: PERRL,  No Pallor , No Icterus  Neck: Supple,no mass palpable  Lungs : Diminished bilaterally  CVS: RRR, S1 S2 normal, No murmur   Abdomen: Soft, NT, BS +  Extremities: No edema  Skin: No rash or lesions. MS: No joint swelling, erythema, warmth  Neurologic: non focal, AAO x 3  Psych: normal affect    []    High complexity decision making was performed  []    Patient is at high-risk of decompensation with multiple organ involvement    Lab Data Personally Reviewed: I have reviewed all the pertinent labs, microbiology data and radiology studies during assessment.     Recent Labs     01/06/22  0052 01/05/22  0822 01/05/22  0345 01/04/22  0238    137 123* 139   K 4.1 4.0 HEMOLYZED,RECOLLECT REQUESTED 3.7    105 101 101   CO2 24 24 22 25   * 137* 130* 112*   BUN 55* 52* 49* 46*   CREA 2.17* 2.06* 2.03* 2.05*   CA 8.2* 8.3* 8.4* 8.3*   ALB  --   --  2.0* 2.5*   ALT  --   --  HEMOLYZED,RECOLLECT REQUESTED 50     Recent Labs     01/06/22  0054 01/05/22  0345 01/04/22  0238 01/03/22  1354   WBC 9.2 6.9 5.0 5.6   HGB 12.0* 12.5 12.2 12.6   HCT 38.2 38.9 38.7 39.0    295 161 180     No results found for: SDES  Lab Results   Component Value Date/Time    Culture result: MODERATE NORMAL RESPIRATORY TONY 01/01/2022 05:11 AM    Culture result: NO GROWTH 5 DAYS 12/31/2021 03:32 PM    Culture result: NO GROWTH 5 DAYS 12/31/2021 03:32 PM     Recent Results (from the past 24 hour(s))   C REACTIVE PROTEIN, QT    Collection Time: 01/05/22 12:38 PM   Result Value Ref Range    C-Reactive protein 3.47 (H) 0.00 - 0.60 mg/dL   SODIUM, UR, RANDOM    Collection Time: 01/05/22  2:19 PM   Result Value Ref Range    Sodium,urine random 13 MMOL/L   CREATININE, UR, RANDOM    Collection Time: 01/05/22  2:19 PM   Result Value Ref Range    Creatinine, urine 184.00 mg/dL   CHLORIDE, URINE RANDOM    Collection Time: 01/05/22  2:19 PM   Result Value Ref Range    Chloride,urine random <10 MMOL/L   URINE CULTURE HOLD SAMPLE    Collection Time: 01/05/22  2:19 PM    Specimen: Serum   Result Value Ref Range    Urine culture hold        Urine on hold in Microbiology dept for 2 days. If unpreserved urine is submitted, it cannot be used for addtional testing after 24 hours, recollection will be required.    METABOLIC PANEL, BASIC    Collection Time: 01/06/22 12:54 AM   Result Value Ref Range    Sodium 136 136 - 145 mmol/L    Potassium 4.1 3.5 - 5.1 mmol/L    Chloride 103 97 - 108 mmol/L    CO2 24 21 - 32 mmol/L    Anion gap 9 5 - 15 mmol/L    Glucose 119 (H) 65 - 100 mg/dL    BUN 55 (H) 6 - 20 MG/DL    Creatinine 2.17 (H) 0.70 - 1.30 MG/DL    BUN/Creatinine ratio 25 (H) 12 - 20      GFR est AA 39 (L) >60 ml/min/1.73m2    GFR est non-AA 32 (L) >60 ml/min/1.73m2    Calcium 8.2 (L) 8.5 - 10.1 MG/DL   CBC WITH AUTOMATED DIFF    Collection Time: 01/06/22 12:54 AM   Result Value Ref Range    WBC 9.2 4.1 - 11.1 K/uL    RBC 4.23 4. 10 - 5.70 M/uL    HGB 12.0 (L) 12.1 - 17.0 g/dL    HCT 38.2 36.6 - 50.3 %    MCV 90.3 80.0 - 99.0 FL    MCH 28.4 26.0 - 34.0 PG    MCHC 31.4 30.0 - 36.5 g/dL    RDW 13.1 11.5 - 14.5 %    PLATELET 791 856 - 745 K/uL    MPV 11.5 8.9 - 12.9 FL    NRBC 0.0 0  WBC    ABSOLUTE NRBC 0.00 0.00 - 0.01 K/uL    NEUTROPHILS 74 32 - 75 %    LYMPHOCYTES 19 12 - 49 %    MONOCYTES 7 5 - 13 %    EOSINOPHILS 0 0 - 7 %    BASOPHILS 0 0 - 1 %    IMMATURE GRANULOCYTES 0 %    ABS. NEUTROPHILS 6.9 1.8 - 8.0 K/UL    ABS. LYMPHOCYTES 1.7 0.8 - 3.5 K/UL    ABS. MONOCYTES 0.6 0.0 - 1.0 K/UL    ABS. EOSINOPHILS 0.0 0.0 - 0.4 K/UL    ABS. BASOPHILS 0.0 0.0 - 0.1 K/UL    ABS. IMM. GRANS. 0.0 K/UL    DF MANUAL      PLATELET COMMENTS Large Platelets      RBC COMMENTS NORMOCYTIC, NORMOCHROMIC      WBC COMMENTS ATYPICAL LYMPHOCYTES PRESENT             I have reviewed the flowsheets. Chart and Pertinent Notes have been reviewed. No change in PMH ,family and social history from Consult note.       Krystal Griffin [facility]

## 2022-01-06 NOTE — PROGRESS NOTES
Bedside shift change report given to Yoly Morris RN (oncoming nurse) by Senait Parks RN (offgoing nurse). Report included the following information SBAR, Kardex, ED Summary, Intake/Output, MAR, Accordion, Recent Results, Med Rec Status, Cardiac Rhythm NSR  and Alarm Parameters .

## 2022-01-06 NOTE — PROGRESS NOTES
Lovenox Monitoring  Indication: PE  Recent Labs     01/06/22  0054 01/05/22  0822 01/05/22  0345 01/04/22  0238 01/04/22  0238   HGB 12.0*  --  12.5  --  12.2     --  295  --  161   CREA 2.17* 2.06* 2.03*   < > 2.05*    < > = values in this interval not displayed. Current Weight: 94.4 kg  Est. CrCl = 46 ml/min  Current Dose: 111 mg subcutaneously every 12 hours. Plan: Change to 90 mg subcutaneously every 12 hours with respect to change in weight per P&T protocol. Pharmacy will continue to monitor the patient daily and make changes as needed.     Curt Delacruz, PharmD, BCPS

## 2022-01-07 PROCEDURE — 74011250636 HC RX REV CODE- 250/636: Performed by: HOSPITALIST

## 2022-01-07 PROCEDURE — 65660000001 HC RM ICU INTERMED STEPDOWN

## 2022-01-07 PROCEDURE — 74011250637 HC RX REV CODE- 250/637: Performed by: STUDENT IN AN ORGANIZED HEALTH CARE EDUCATION/TRAINING PROGRAM

## 2022-01-07 PROCEDURE — 94640 AIRWAY INHALATION TREATMENT: CPT

## 2022-01-07 PROCEDURE — 74011000250 HC RX REV CODE- 250: Performed by: INTERNAL MEDICINE

## 2022-01-07 PROCEDURE — 94760 N-INVAS EAR/PLS OXIMETRY 1: CPT

## 2022-01-07 PROCEDURE — 74011000250 HC RX REV CODE- 250: Performed by: STUDENT IN AN ORGANIZED HEALTH CARE EDUCATION/TRAINING PROGRAM

## 2022-01-07 PROCEDURE — 94761 N-INVAS EAR/PLS OXIMETRY MLT: CPT

## 2022-01-07 PROCEDURE — 74011250636 HC RX REV CODE- 250/636: Performed by: STUDENT IN AN ORGANIZED HEALTH CARE EDUCATION/TRAINING PROGRAM

## 2022-01-07 PROCEDURE — 77010033711 HC HIGH FLOW OXYGEN

## 2022-01-07 PROCEDURE — 74011250637 HC RX REV CODE- 250/637: Performed by: INTERNAL MEDICINE

## 2022-01-07 RX ADMIN — CARVEDILOL 6.25 MG: 6.25 TABLET, FILM COATED ORAL at 08:57

## 2022-01-07 RX ADMIN — SODIUM CHLORIDE, PRESERVATIVE FREE 10 ML: 5 INJECTION INTRAVENOUS at 08:58

## 2022-01-07 RX ADMIN — OXYCODONE HYDROCHLORIDE AND ACETAMINOPHEN 500 MG: 500 TABLET ORAL at 17:53

## 2022-01-07 RX ADMIN — ALBUTEROL SULFATE 2 PUFF: 90 AEROSOL, METERED RESPIRATORY (INHALATION) at 15:24

## 2022-01-07 RX ADMIN — Medication 100 MG: at 08:57

## 2022-01-07 RX ADMIN — SODIUM CHLORIDE, PRESERVATIVE FREE 10 ML: 5 INJECTION INTRAVENOUS at 14:00

## 2022-01-07 RX ADMIN — CARVEDILOL 6.25 MG: 6.25 TABLET, FILM COATED ORAL at 17:53

## 2022-01-07 RX ADMIN — CLONIDINE HYDROCHLORIDE 0.1 MG: 0.1 TABLET ORAL at 00:55

## 2022-01-07 RX ADMIN — ALBUTEROL SULFATE 2 PUFF: 90 AEROSOL, METERED RESPIRATORY (INHALATION) at 20:51

## 2022-01-07 RX ADMIN — ENOXAPARIN SODIUM 90 MG: 100 INJECTION SUBCUTANEOUS at 08:58

## 2022-01-07 RX ADMIN — ZINC SULFATE 220 MG (50 MG) CAPSULE 1 CAPSULE: CAPSULE at 08:57

## 2022-01-07 RX ADMIN — ENOXAPARIN SODIUM 90 MG: 100 INJECTION SUBCUTANEOUS at 20:51

## 2022-01-07 RX ADMIN — OXYCODONE HYDROCHLORIDE AND ACETAMINOPHEN 500 MG: 500 TABLET ORAL at 08:57

## 2022-01-07 RX ADMIN — ALBUTEROL SULFATE 2 PUFF: 90 AEROSOL, METERED RESPIRATORY (INHALATION) at 11:18

## 2022-01-07 RX ADMIN — ALBUTEROL SULFATE 2 PUFF: 90 AEROSOL, METERED RESPIRATORY (INHALATION) at 08:19

## 2022-01-07 RX ADMIN — SODIUM CHLORIDE, PRESERVATIVE FREE 10 ML: 5 INJECTION INTRAVENOUS at 22:00

## 2022-01-07 RX ADMIN — Medication 2000 UNITS: at 08:57

## 2022-01-07 RX ADMIN — BUMETANIDE 1 MG: 0.25 INJECTION INTRAMUSCULAR; INTRAVENOUS at 08:57

## 2022-01-07 RX ADMIN — AMLODIPINE BESYLATE 10 MG: 5 TABLET ORAL at 08:57

## 2022-01-07 RX ADMIN — METHYLPREDNISOLONE SODIUM SUCCINATE 80 MG: 125 INJECTION, POWDER, FOR SOLUTION INTRAMUSCULAR; INTRAVENOUS at 11:40

## 2022-01-07 RX ADMIN — GUAIFENESIN AND DEXTROMETHORPHAN 10 ML: 100; 10 SYRUP ORAL at 00:46

## 2022-01-07 RX ADMIN — METHYLPREDNISOLONE SODIUM SUCCINATE 80 MG: 125 INJECTION, POWDER, FOR SOLUTION INTRAMUSCULAR; INTRAVENOUS at 00:46

## 2022-01-07 NOTE — PROGRESS NOTES
[address] [facility]                                                                                          Hospitalist Progress Note  Claudy Crowder MD  Answering service: [phone] from in house phone        Date of Service:  2022  NAME:  Andrews Gaines  :  1970  MRN:  [id number]      Admission Summary: Nayeli Mcduffie is a 45 y.o. male with hx of htn, ckd iii who presented to ED with complaints of hemoptysis, generalized body aches, fatigue, malaise myalgias, chills and shortness of breath. He was subsequently admitted for acute hypoxic respiratory failure secondary to COVID-19 pneumonia and was placed on appropriate maximum medical therapy. Patient was noted to have increased oxygen requirement requiring mid flow to high flow oxygen and was therefore transferred to [facility] for higher level of care. He is seen and examined at bedside and has no acute complaints or concerns. Denies any symptoms while resting. \"    Interval history / Subjective:   2022. Saw patient this morning, awake alert and oriented, no apparent distress, reporting shortness of breath on minimal exertion otherwise denies any fever, chills, nausea, vomiting.  P.o. tolerant. Assessment & Plan:     Acute respiratory failure with hypoxia  Due to COVID-19 pneumonia  Currently on 15 L high flow nasal cannula. VQ scan is intermediate for PE. Continue supplemental oxygen, baricitinib, dexamethasone, vitamin C, zinc sulfate, therapeutic Lovenox. COVID-19 pneumonia  Not vaccinated. As above     RUTH on CKD stage III  Likely prerenal, due to dehydration. Baseline creatinine 1.7. Nonoliguric, electrolytes WNL. Monitor volume status atelectasis, replace electrolytes as needed. Renally dose medications, avoid nephrotoxic meds. Hypertension  Controlled, continue Coreg and amlodipine. Hold ACE and HCTZ due to worsening renal function.     Resume ACE and ARB once appropriate. Monitor closely, as needed IV hydralazine.        Obesity  BMI 35.0. Diet and lifestyle modification recommended. Code status: Full  DVT prophylaxis: lovenox fully anticoagulated for PE    Care Plan discussed with: Patient/Family  Anticipated Disposition: Home w/Family  Anticipated Discharge: Greater than 48 hours     Hospital Problems  Date Reviewed: 3/29/2021          Codes Class Noted POA    COVID-19 ICD-10-CM: U07.1  ICD-9-CM: 079.89  1/5/2022 Unknown                Review of Systems:   A comprehensive review of systems was negative except for that written in the HPI. Vital Signs:    Last 24hrs VS reviewed since prior progress note. Most recent are:  Visit Vitals  BP (!) 135/94 (BP 1 Location: Left upper arm, BP Patient Position: Sitting)   Pulse 67   Temp 97.8 °F (36.6 °C)   Resp 18   Wt 94.4 kg (208 lb 1.8 oz)   SpO2 94%   BMI 29.86 kg/m²         Intake/Output Summary (Last 24 hours) at 1/7/2022 1402  Last data filed at 1/7/2022 1143  Gross per 24 hour   Intake --   Output 2125 ml   Net -2125 ml        Physical Examination:     I had a face to face encounter with this patient and independently examined them on 1/7/2022 as outlined below:          Constitutional:  No acute distress, cooperative, pleasant    ENT:  Oral mucosa moist, oropharynx benign. Resp:  Course bilaterally. No wheezing/rhonchi/rales. CV:  Regular rhythm, normal rate, no murmurs, gallops, rubs    GI:  Soft, non distended, non tender. normoactive bowel sounds, no hepatosplenomegaly     Musculoskeletal:  No edema, warm, 2+ pulses throughout    Neurologic:  Moves all extremities.   AAOx3, CN II-XII reviewed            Data Review:    Review and/or order of clinical lab test  Review and/or order of tests in the radiology section of CPT  Review and/or order of tests in the medicine section of CPT      Labs:     Recent Labs     01/06/22  0054 01/05/22  0345   WBC 9.2 6.9   HGB 12.0* 12.5   HCT 38.2 38.9    295     Recent Labs     01/06/22  0054 01/05/22  0822 01/05/22  0345    137 123*   K 4.1 4.0 HEMOLYZED,RECOLLECT REQUESTED    105 101   CO2 24 24 22   BUN 55* 52* 49*   CREA 2.17* 2.06* 2.03*   * 137* 130*   CA 8.2* 8.3* 8.4*     Recent Labs     01/05/22  0345   ALT HEMOLYZED,RECOLLECT REQUESTED   AP 47   TBILI HEMOLYZED,RECOLLECT REQUESTED   TP 8.4*   ALB 2.0*   GLOB 6.4*     No results for input(s): INR, PTP, APTT, INREXT, INREXT in the last 72 hours. No results for input(s): FE, TIBC, PSAT, FERR in the last 72 hours. No results found for: FOL, RBCF   No results for input(s): PH, PCO2, PO2 in the last 72 hours. No results for input(s): CPK, CKNDX, TROIQ in the last 72 hours.     No lab exists for component: CPKMB  Lab Results   Component Value Date/Time    Cholesterol, total 199 03/08/2021 01:10 PM    HDL Cholesterol 50 03/08/2021 01:10 PM    LDL, calculated 124.2 (H) 03/08/2021 01:10 PM    Triglyceride 124 03/08/2021 01:10 PM    CHOL/HDL Ratio 4.0 03/08/2021 01:10 PM     Lab Results   Component Value Date/Time    Glucose (POC) 129 (H) 01/01/2022 01:03 PM     Lab Results   Component Value Date/Time    Color DARK YELLOW 12/31/2021 09:26 PM    Appearance CLEAR 12/31/2021 09:26 PM    Specific gravity 1.015 12/31/2021 09:26 PM    pH (UA) 6.0 12/31/2021 09:26 PM    Protein 100 (A) 12/31/2021 09:26 PM    Glucose Negative 12/31/2021 09:26 PM    Ketone 15 (A) 12/31/2021 09:26 PM    Bilirubin Negative 12/31/2021 09:26 PM    Urobilinogen >8.0 (H) 12/31/2021 09:26 PM    Nitrites Negative 12/31/2021 09:26 PM    Leukocyte Esterase Negative 12/31/2021 09:26 PM    Epithelial cells FEW 12/31/2021 09:26 PM    Bacteria Negative 12/31/2021 09:26 PM    WBC 0-4 12/31/2021 09:26 PM    RBC 5-10 12/31/2021 09:26 PM         Medications Reviewed:     Current Facility-Administered Medications   Medication Dose Route Frequency    bumetanide (BUMEX) injection 1 mg  1 mg IntraVENous DAILY    enoxaparin (LOVENOX) injection 90 mg  90 mg SubCUTAneous Q12H    amLODIPine (NORVASC) tablet 10 mg  10 mg Oral DAILY    sodium chloride (OCEAN) 0.65 % nasal squeeze bottle 2 Spray  2 Spray Both Nostrils Q2H PRN    albuterol (PROVENTIL HFA, VENTOLIN HFA, PROAIR HFA) inhaler 2 Puff  2 Puff Inhalation QID RT    ascorbic acid (vitamin C) (VITAMIN C) tablet 500 mg  500 mg Oral BID    benzonatate (TESSALON) capsule 100 mg  100 mg Oral TID PRN    carvediloL (COREG) tablet 6.25 mg  6.25 mg Oral BID WITH MEALS    cholecalciferol (VITAMIN D3) (1000 Units /25 mcg) tablet 2,000 Units  2,000 Units Oral DAILY    cloNIDine HCL (CATAPRES) tablet 0.1 mg  0.1 mg Oral Q6H PRN    guaiFENesin-dextromethorphan (ROBITUSSIN DM) 100-10 mg/5 mL syrup 10 mL  10 mL Oral Q4H PRN    melatonin tablet 9 mg  9 mg Oral QHS PRN    zinc sulfate (ZINCATE) 50 mg zinc (220 mg) capsule 1 Capsule  1 Capsule Oral DAILY    thiamine HCL (B-1) tablet 100 mg  100 mg Oral DAILY    methylPREDNISolone (PF) (Solu-MEDROL) injection 80 mg  80 mg IntraVENous Q12H    sodium chloride (NS) flush 5-40 mL  5-40 mL IntraVENous Q8H    sodium chloride (NS) flush 5-40 mL  5-40 mL IntraVENous PRN    acetaminophen (TYLENOL) suppository 650 mg  650 mg Rectal Q6H PRN    polyethylene glycol (MIRALAX) packet 17 g  17 g Oral DAILY PRN    ondansetron (ZOFRAN ODT) tablet 4 mg  4 mg Oral Q8H PRN    Or    ondansetron (ZOFRAN) injection 4 mg  4 mg IntraVENous Q6H PRN    acetaminophen (TYLENOL) tablet 650 mg  650 mg Oral Q6H PRN     ______________________________________________________________________  EXPECTED LENGTH OF STAY: 5d 9h  ACTUAL LENGTH OF STAY:          2                 Ofelia Essex, MD

## 2022-01-07 NOTE — PROGRESS NOTES
Problem: Gas Exchange - Impaired  Goal: Absence of hypoxia  1/7/2022 0943 by Kevan Jon RN  Outcome: Progressing Towards Goal  Note: Pt has been weaned to 20 L HF from 28.

## 2022-01-07 NOTE — PROGRESS NOTES
[facility]   [address] [facility], [address] [place] [address]  Phone: [phone]   [phone]    [email or web address]     Nephrology Progress Note    Patient Name : Keron Chacko      : 1970     MRN : [id number]  Date of Admission : 2022  Date of Servive : 22    CC:  Follow up for *RUTH       Assessment and Plan   RUTH on CKD:  -2/2 COVID infection   - Non oliguric   - renal US : deferred due to covid isolation   - continue w/ low dose Bumex   - labs daily      CKD 3B:  -Baseline creatinine 1.7 to 1.8 mg/dL  -Etiology: 2/2 chronic HTN  -UA with microscopic hematuria and proteinuria.  -Hold off on serologies at this time.     SARS-CoV-2 infection  Bilateral pneumonia  Acute hypoxic respiratory failure  -On Solu-Medrol, baricitinib, vitamin C and zinc  -Per primary team     Chronic HTN:  -Hold lisinopril HCTZ  -Continue other meds     Care Plan discussed with: Patient     Interval History:  Seen and examined. Reports improving SOB. O2 requirements down. Cr up a little. Has good UOP     Review of Systems: A comprehensive review of systems was negative except for that written in the HPI.     Current Medications:   Current Facility-Administered Medications   Medication Dose Route Frequency    bumetanide (BUMEX) injection 1 mg  1 mg IntraVENous DAILY    enoxaparin (LOVENOX) injection 90 mg  90 mg SubCUTAneous Q12H    amLODIPine (NORVASC) tablet 10 mg  10 mg Oral DAILY    sodium chloride (OCEAN) 0.65 % nasal squeeze bottle 2 Spray  2 Spray Both Nostrils Q2H PRN    albuterol (PROVENTIL HFA, VENTOLIN HFA, PROAIR HFA) inhaler 2 Puff  2 Puff Inhalation QID RT    ascorbic acid (vitamin C) (VITAMIN C) tablet 500 mg  500 mg Oral BID    benzonatate (TESSALON) capsule 100 mg  100 mg Oral TID PRN    carvediloL (COREG) tablet 6.25 mg  6.25 mg Oral BID WITH MEALS    cholecalciferol (VITAMIN D3) (1000 Units /25 mcg) tablet 2,000 Units  2,000 Units Oral DAILY    cloNIDine HCL (CATAPRES) tablet 0.1 mg  0.1 mg Oral Q6H PRN    guaiFENesin-dextromethorphan (ROBITUSSIN DM) 100-10 mg/5 mL syrup 10 mL  10 mL Oral Q4H PRN    melatonin tablet 9 mg  9 mg Oral QHS PRN    zinc sulfate (ZINCATE) 50 mg zinc (220 mg) capsule 1 Capsule  1 Capsule Oral DAILY    thiamine HCL (B-1) tablet 100 mg  100 mg Oral DAILY    methylPREDNISolone (PF) (Solu-MEDROL) injection 80 mg  80 mg IntraVENous Q12H    sodium chloride (NS) flush 5-40 mL  5-40 mL IntraVENous Q8H    sodium chloride (NS) flush 5-40 mL  5-40 mL IntraVENous PRN    acetaminophen (TYLENOL) suppository 650 mg  650 mg Rectal Q6H PRN    polyethylene glycol (MIRALAX) packet 17 g  17 g Oral DAILY PRN    ondansetron (ZOFRAN ODT) tablet 4 mg  4 mg Oral Q8H PRN    Or    ondansetron (ZOFRAN) injection 4 mg  4 mg IntraVENous Q6H PRN    acetaminophen (TYLENOL) tablet 650 mg  650 mg Oral Q6H PRN      No Known Allergies    Objective:  Vitals:    Vitals:    01/07/22 0600 01/07/22 0743 01/07/22 0819 01/07/22 0858   BP:  (!) 142/94     Pulse: 62 63     Resp:  (!) 32     Temp:  98 °F (36.7 °C)     SpO2:  98% 100% 99%   Weight:         Intake and Output:  No intake/output data recorded. 01/05 1901 - 01/07 0700  In: -   Out: 9794 [Urine:2725]    Physical Examination:  General: On mid flow O2  HEENT: PERRL,  No Pallor , No Icterus  Neck: Supple,no mass palpable  Lungs : Diminished bilaterally  CVS: RRR, S1 S2 normal, No murmur   Abdomen: Soft, NT, BS +  Extremities: No edema  Skin: No rash or lesions. MS: No joint swelling, erythema, warmth  Neurologic: non focal, AAO x 3  Psych: normal affect    []    High complexity decision making was performed  []    Patient is at high-risk of decompensation with multiple organ involvement    Lab Data Personally Reviewed: I have reviewed all the pertinent labs, microbiology data and radiology studies during assessment.     Recent Labs     01/06/22  0054 01/05/22  0822 01/05/22  0345    137 123*   K 4.1 4. 0 HEMOLYZED,RECOLLECT REQUESTED    105 101   CO2 24 24 22   * 137* 130*   BUN 55* 52* 49*   CREA 2.17* 2.06* 2.03*   CA 8.2* 8.3* 8.4*   ALB  --   --  2.0*   ALT  --   --  HEMOLYZED,RECOLLECT REQUESTED     Recent Labs     01/06/22  0054 01/05/22  0345   WBC 9.2 6.9   HGB 12.0* 12.5   HCT 38.2 38.9    295     No results found for: SDES  Lab Results   Component Value Date/Time    Culture result: MODERATE NORMAL RESPIRATORY TONY 01/01/2022 05:11 AM    Culture result: NO GROWTH 5 DAYS 12/31/2021 03:32 PM    Culture result: NO GROWTH 5 DAYS 12/31/2021 03:32 PM     No results found for this or any previous visit (from the past 24 hour(s)). I have reviewed the flowsheets. Chart and Pertinent Notes have been reviewed. No change in PMH ,family and social history from Consult note.       Dwain Bowden, [facility]

## 2022-01-07 NOTE — PROGRESS NOTES
Bedside shift change report given to Jo-Ann Browne RN (oncoming nurse) by Iris Smallwood RN (offgoing nurse). Report included the following information SBAR, Kardex, ED Summary, Intake/Output, MAR, Accordion, Recent Results, Med Rec Status, Cardiac Rhythm SR and Alarm Parameters .

## 2022-01-08 LAB
ALBUMIN SERPL-MCNC: 2.4 G/DL (ref 3.5–5)
ALBUMIN/GLOB SERPL: 0.5 {RATIO} (ref 1.1–2.2)
ALP SERPL-CCNC: 48 U/L (ref 45–117)
ALT SERPL-CCNC: 66 U/L (ref 12–78)
ANION GAP SERPL CALC-SCNC: 6 MMOL/L (ref 5–15)
ANION GAP SERPL CALC-SCNC: 7 MMOL/L (ref 5–15)
AST SERPL-CCNC: 94 U/L (ref 15–37)
BASOPHILS # BLD: 0 K/UL (ref 0–0.1)
BASOPHILS NFR BLD: 0 % (ref 0–1)
BILIRUB SERPL-MCNC: 0.8 MG/DL (ref 0.2–1)
BUN SERPL-MCNC: 51 MG/DL (ref 6–20)
BUN SERPL-MCNC: 53 MG/DL (ref 6–20)
BUN/CREAT SERPL: 28 (ref 12–20)
BUN/CREAT SERPL: 28 (ref 12–20)
CALCIUM SERPL-MCNC: 7.8 MG/DL (ref 8.5–10.1)
CALCIUM SERPL-MCNC: 8 MG/DL (ref 8.5–10.1)
CHLORIDE SERPL-SCNC: 100 MMOL/L (ref 97–108)
CHLORIDE SERPL-SCNC: 99 MMOL/L (ref 97–108)
CO2 SERPL-SCNC: 21 MMOL/L (ref 21–32)
CO2 SERPL-SCNC: 26 MMOL/L (ref 21–32)
CREAT SERPL-MCNC: 1.8 MG/DL (ref 0.7–1.3)
CREAT SERPL-MCNC: 1.92 MG/DL (ref 0.7–1.3)
DIFFERENTIAL METHOD BLD: ABNORMAL
EOSINOPHIL # BLD: 0 K/UL (ref 0–0.4)
EOSINOPHIL NFR BLD: 0 % (ref 0–7)
ERYTHROCYTE [DISTWIDTH] IN BLOOD BY AUTOMATED COUNT: 12.8 % (ref 11.5–14.5)
ERYTHROCYTE [DISTWIDTH] IN BLOOD BY AUTOMATED COUNT: 12.8 % (ref 11.5–14.5)
GLOBULIN SER CALC-MCNC: 4.6 G/DL (ref 2–4)
GLUCOSE SERPL-MCNC: 163 MG/DL (ref 65–100)
GLUCOSE SERPL-MCNC: 176 MG/DL (ref 65–100)
HCT VFR BLD AUTO: 36.1 % (ref 36.6–50.3)
HCT VFR BLD AUTO: 40.2 % (ref 36.6–50.3)
HGB BLD-MCNC: 11.9 G/DL (ref 12.1–17)
HGB BLD-MCNC: 12.6 G/DL (ref 12.1–17)
IMM GRANULOCYTES # BLD AUTO: 0 K/UL
IMM GRANULOCYTES NFR BLD AUTO: 0 %
LYMPHOCYTES # BLD: 0.7 K/UL (ref 0.8–3.5)
LYMPHOCYTES NFR BLD: 6 % (ref 12–49)
MAGNESIUM SERPL-MCNC: 2.2 MG/DL (ref 1.6–2.4)
MCH RBC QN AUTO: 28.8 PG (ref 26–34)
MCH RBC QN AUTO: 29 PG (ref 26–34)
MCHC RBC AUTO-ENTMCNC: 31.3 G/DL (ref 30–36.5)
MCHC RBC AUTO-ENTMCNC: 33 G/DL (ref 30–36.5)
MCV RBC AUTO: 88 FL (ref 80–99)
MCV RBC AUTO: 91.8 FL (ref 80–99)
MONOCYTES # BLD: 0.6 K/UL (ref 0–1)
MONOCYTES NFR BLD: 5 % (ref 5–13)
NEUTS BAND NFR BLD MANUAL: 1 % (ref 0–6)
NEUTS SEG # BLD: 10.1 K/UL (ref 1.8–8)
NEUTS SEG NFR BLD: 88 % (ref 32–75)
NRBC # BLD: 0 K/UL (ref 0–0.01)
NRBC # BLD: 0 K/UL (ref 0–0.01)
NRBC BLD-RTO: 0 PER 100 WBC
NRBC BLD-RTO: 0 PER 100 WBC
PHOSPHATE SERPL-MCNC: 3.8 MG/DL (ref 2.6–4.7)
PLATELET # BLD AUTO: 340 K/UL (ref 150–400)
PLATELET # BLD AUTO: 360 K/UL (ref 150–400)
PLATELET COMMENTS,PCOM: ABNORMAL
PMV BLD AUTO: 10.6 FL (ref 8.9–12.9)
PMV BLD AUTO: 11.1 FL (ref 8.9–12.9)
POTASSIUM SERPL-SCNC: 4 MMOL/L (ref 3.5–5.1)
POTASSIUM SERPL-SCNC: ABNORMAL MMOL/L (ref 3.5–5.1)
PROT SERPL-MCNC: 7 G/DL (ref 6.4–8.2)
RBC # BLD AUTO: 4.1 M/UL (ref 4.1–5.7)
RBC # BLD AUTO: 4.38 M/UL (ref 4.1–5.7)
RBC MORPH BLD: ABNORMAL
SODIUM SERPL-SCNC: 126 MMOL/L (ref 136–145)
SODIUM SERPL-SCNC: 133 MMOL/L (ref 136–145)
WBC # BLD AUTO: 11.3 K/UL (ref 4.1–11.1)
WBC # BLD AUTO: 11.4 K/UL (ref 4.1–11.1)
WBC MORPH BLD: ABNORMAL

## 2022-01-08 PROCEDURE — 74011250636 HC RX REV CODE- 250/636: Performed by: HOSPITALIST

## 2022-01-08 PROCEDURE — 74011000250 HC RX REV CODE- 250: Performed by: STUDENT IN AN ORGANIZED HEALTH CARE EDUCATION/TRAINING PROGRAM

## 2022-01-08 PROCEDURE — 83735 ASSAY OF MAGNESIUM: CPT

## 2022-01-08 PROCEDURE — 85027 COMPLETE CBC AUTOMATED: CPT

## 2022-01-08 PROCEDURE — 74011250636 HC RX REV CODE- 250/636: Performed by: STUDENT IN AN ORGANIZED HEALTH CARE EDUCATION/TRAINING PROGRAM

## 2022-01-08 PROCEDURE — 74011250637 HC RX REV CODE- 250/637: Performed by: INTERNAL MEDICINE

## 2022-01-08 PROCEDURE — 74011250637 HC RX REV CODE- 250/637: Performed by: STUDENT IN AN ORGANIZED HEALTH CARE EDUCATION/TRAINING PROGRAM

## 2022-01-08 PROCEDURE — 85025 COMPLETE CBC W/AUTO DIFF WBC: CPT

## 2022-01-08 PROCEDURE — 65270000029 HC RM PRIVATE

## 2022-01-08 PROCEDURE — 74011000250 HC RX REV CODE- 250: Performed by: INTERNAL MEDICINE

## 2022-01-08 PROCEDURE — 36415 COLL VENOUS BLD VENIPUNCTURE: CPT

## 2022-01-08 PROCEDURE — 84100 ASSAY OF PHOSPHORUS: CPT

## 2022-01-08 PROCEDURE — 80053 COMPREHEN METABOLIC PANEL: CPT

## 2022-01-08 PROCEDURE — 94640 AIRWAY INHALATION TREATMENT: CPT

## 2022-01-08 RX ADMIN — METHYLPREDNISOLONE SODIUM SUCCINATE 80 MG: 125 INJECTION, POWDER, FOR SOLUTION INTRAMUSCULAR; INTRAVENOUS at 11:55

## 2022-01-08 RX ADMIN — Medication 100 MG: at 09:34

## 2022-01-08 RX ADMIN — ALBUTEROL SULFATE 2 PUFF: 90 AEROSOL, METERED RESPIRATORY (INHALATION) at 15:22

## 2022-01-08 RX ADMIN — SODIUM CHLORIDE, PRESERVATIVE FREE 10 ML: 5 INJECTION INTRAVENOUS at 06:00

## 2022-01-08 RX ADMIN — Medication 2000 UNITS: at 09:34

## 2022-01-08 RX ADMIN — AMLODIPINE BESYLATE 10 MG: 5 TABLET ORAL at 09:34

## 2022-01-08 RX ADMIN — BUMETANIDE 1 MG: 0.25 INJECTION INTRAMUSCULAR; INTRAVENOUS at 09:34

## 2022-01-08 RX ADMIN — OXYCODONE HYDROCHLORIDE AND ACETAMINOPHEN 500 MG: 500 TABLET ORAL at 17:46

## 2022-01-08 RX ADMIN — ENOXAPARIN SODIUM 90 MG: 100 INJECTION SUBCUTANEOUS at 22:30

## 2022-01-08 RX ADMIN — CLONIDINE HYDROCHLORIDE 0.1 MG: 0.1 TABLET ORAL at 01:07

## 2022-01-08 RX ADMIN — ALBUTEROL SULFATE 2 PUFF: 90 AEROSOL, METERED RESPIRATORY (INHALATION) at 21:11

## 2022-01-08 RX ADMIN — SODIUM CHLORIDE, PRESERVATIVE FREE 10 ML: 5 INJECTION INTRAVENOUS at 22:00

## 2022-01-08 RX ADMIN — ZINC SULFATE 220 MG (50 MG) CAPSULE 1 CAPSULE: CAPSULE at 09:34

## 2022-01-08 RX ADMIN — ALBUTEROL SULFATE 2 PUFF: 90 AEROSOL, METERED RESPIRATORY (INHALATION) at 08:58

## 2022-01-08 RX ADMIN — CARVEDILOL 6.25 MG: 6.25 TABLET, FILM COATED ORAL at 17:46

## 2022-01-08 RX ADMIN — OXYCODONE HYDROCHLORIDE AND ACETAMINOPHEN 500 MG: 500 TABLET ORAL at 09:34

## 2022-01-08 RX ADMIN — ALBUTEROL SULFATE 2 PUFF: 90 AEROSOL, METERED RESPIRATORY (INHALATION) at 12:32

## 2022-01-08 RX ADMIN — BENZONATATE 100 MG: 100 CAPSULE ORAL at 22:59

## 2022-01-08 RX ADMIN — ENOXAPARIN SODIUM 90 MG: 100 INJECTION SUBCUTANEOUS at 09:34

## 2022-01-08 RX ADMIN — METHYLPREDNISOLONE SODIUM SUCCINATE 80 MG: 125 INJECTION, POWDER, FOR SOLUTION INTRAMUSCULAR; INTRAVENOUS at 00:28

## 2022-01-08 RX ADMIN — CARVEDILOL 6.25 MG: 6.25 TABLET, FILM COATED ORAL at 09:34

## 2022-01-08 NOTE — PROGRESS NOTES
[facility]  [facility]ist Group                                                                                          Hospitalist Progress Note  Catracho Black MD  Answering service: [phone] OR [phone] from in house phone        Date of Service:  2022  NAME:  Keron Chacko  :  1970  MRN:  [id number]      Admission Summary: Mary Marie is a 45 y.o. male with hx of htn, ckd iii who presented to ED with complaints of hemoptysis, generalized body aches, fatigue, malaise myalgias, chills and shortness of breath. He was subsequently admitted for acute hypoxic respiratory failure secondary to COVID-19 pneumonia and was placed on appropriate maximum medical therapy. Patient was noted to have increased oxygen requirement requiring mid flow to high flow oxygen and was therefore transferred to [facility] for higher level of care. He is seen and examined at bedside and has no acute complaints or concerns. Denies any symptoms while resting. \"    Interval history / Subjective:   Feels better today, breathing better, denies pain, n/v/d. Assessment & Plan:     Acute respiratory failure with hypoxia  Due to COVID-19 pneumonia  Now off high flow O2  VQ scan is intermediate for PE - continue therapeutic Lovenox for now  Continue IV methylprednisolone, will reduce dose to 60 mg BID    COVID-19 pneumonia  Not vaccinated. As above     RUTH on CKD stage III  Likely prerenal, due to dehydration. Baseline creatinine 1.7. Nonoliguric, electrolytes WNL. Monitor volume status atelectasis, replace electrolytes as needed. Renally dose medications, avoid nephrotoxic meds. Continue IV bumetanide    Hypertension  Controlled, continue Coreg and amlodipine. Hold ACE and HCTZ   Resume ACE and ARB once appropriate.     Monitor closely, as needed IV hydralazine.        Obesity  BMI 35.0      Code status: Full  DVT prophylaxis: lovenox fully anticoagulated for PE    Care Plan discussed with: Patient/Family  Anticipated Disposition: Home w/Family  Anticipated Discharge: home hopefully tomorrow     Hospital Problems  Date Reviewed: 3/29/2021          Codes Class Noted POA    COVID-19 ICD-10-CM: U07.1  ICD-9-CM: 079.89  1/5/2022 Unknown                Review of Systems:   Pertinent items are noted in HPI. Vital Signs:    Last 24hrs VS reviewed since prior progress note. Most recent are:  Visit Vitals  /77 (BP 1 Location: Right upper arm, BP Patient Position: Lying)   Pulse 78   Temp 98 °F (36.7 °C)   Resp 15   Wt 94.4 kg (208 lb 1.8 oz)   SpO2 95%   BMI 29.86 kg/m²         Intake/Output Summary (Last 24 hours) at 1/8/2022 1601  Last data filed at 1/8/2022 6031  Gross per 24 hour   Intake --   Output 475 ml   Net -475 ml        Physical Examination:     I had a face to face encounter with this patient and independently examined them on 1/8/2022 as outlined below:          Constitutional:  No acute distress, cooperative, pleasant    ENT:  Oral mucosa moist, oropharynx benign. Resp:  Course bilaterally. No wheezing/rhonchi/rales. CV:  Regular rhythm, normal rate, no murmurs, gallops, rubs    GI:  Soft, non distended, non tender. normoactive bowel sounds     Musculoskeletal:  No edema, warm, 2+ pulses throughout    Neurologic:  Moves all extremities.   AAOx3, CN II-XII reviewed            Data Review:    Review and/or order of clinical lab test  Review and/or order of tests in the radiology section of CPT  Review and/or order of tests in the medicine section of CPT      Labs:     Recent Labs     01/08/22  0652 01/08/22 0248   WBC 11.3* 11.4*   HGB 11.9* 12.6   HCT 36.1* 40.2    340     Recent Labs     01/08/22  0652 01/08/22  0248 01/06/22  0054   * 126* 136   K 4.0 HEMOLYZED,RECOLLECT REQUESTED 4.1    99 103   CO2 26 21 24   BUN 51* 53* 55*   CREA 1.80* 1.92* 2.17*   * 176* 119*   CA 8.0* 7.8* 8.2*   MG  --  2.2  --    PHOS  --  3.8  --      Recent Labs 01/08/22  0248   ALT 66   AP 48   TBILI 0.8   TP 7.0   ALB 2.4*   GLOB 4.6*     No results for input(s): INR, PTP, APTT, INREXT, INREXT in the last 72 hours. No results for input(s): FE, TIBC, PSAT, FERR in the last 72 hours. No results found for: FOL, RBCF   No results for input(s): PH, PCO2, PO2 in the last 72 hours. No results for input(s): CPK, CKNDX, TROIQ in the last 72 hours.     No lab exists for component: CPKMB  Lab Results   Component Value Date/Time    Cholesterol, total 199 03/08/2021 01:10 PM    HDL Cholesterol 50 03/08/2021 01:10 PM    LDL, calculated 124.2 (H) 03/08/2021 01:10 PM    Triglyceride 124 03/08/2021 01:10 PM    CHOL/HDL Ratio 4.0 03/08/2021 01:10 PM     Lab Results   Component Value Date/Time    Glucose (POC) 129 (H) 01/01/2022 01:03 PM     Lab Results   Component Value Date/Time    Color DARK YELLOW 12/31/2021 09:26 PM    Appearance CLEAR 12/31/2021 09:26 PM    Specific gravity 1.015 12/31/2021 09:26 PM    pH (UA) 6.0 12/31/2021 09:26 PM    Protein 100 (A) 12/31/2021 09:26 PM    Glucose Negative 12/31/2021 09:26 PM    Ketone 15 (A) 12/31/2021 09:26 PM    Bilirubin Negative 12/31/2021 09:26 PM    Urobilinogen >8.0 (H) 12/31/2021 09:26 PM    Nitrites Negative 12/31/2021 09:26 PM    Leukocyte Esterase Negative 12/31/2021 09:26 PM    Epithelial cells FEW 12/31/2021 09:26 PM    Bacteria Negative 12/31/2021 09:26 PM    WBC 0-4 12/31/2021 09:26 PM    RBC 5-10 12/31/2021 09:26 PM         Medications Reviewed:     Current Facility-Administered Medications   Medication Dose Route Frequency    bumetanide (BUMEX) injection 1 mg  1 mg IntraVENous DAILY    enoxaparin (LOVENOX) injection 90 mg  90 mg SubCUTAneous Q12H    amLODIPine (NORVASC) tablet 10 mg  10 mg Oral DAILY    sodium chloride (OCEAN) 0.65 % nasal squeeze bottle 2 Spray  2 Spray Both Nostrils Q2H PRN    albuterol (PROVENTIL HFA, VENTOLIN HFA, PROAIR HFA) inhaler 2 Puff  2 Puff Inhalation QID RT    ascorbic acid (vitamin C) (VITAMIN C) tablet 500 mg  500 mg Oral BID    benzonatate (TESSALON) capsule 100 mg  100 mg Oral TID PRN    carvediloL (COREG) tablet 6.25 mg  6.25 mg Oral BID WITH MEALS    cholecalciferol (VITAMIN D3) (1000 Units /25 mcg) tablet 2,000 Units  2,000 Units Oral DAILY    cloNIDine HCL (CATAPRES) tablet 0.1 mg  0.1 mg Oral Q6H PRN    guaiFENesin-dextromethorphan (ROBITUSSIN DM) 100-10 mg/5 mL syrup 10 mL  10 mL Oral Q4H PRN    melatonin tablet 9 mg  9 mg Oral QHS PRN    zinc sulfate (ZINCATE) 50 mg zinc (220 mg) capsule 1 Capsule  1 Capsule Oral DAILY    thiamine HCL (B-1) tablet 100 mg  100 mg Oral DAILY    methylPREDNISolone (PF) (Solu-MEDROL) injection 80 mg  80 mg IntraVENous Q12H    sodium chloride (NS) flush 5-40 mL  5-40 mL IntraVENous Q8H    sodium chloride (NS) flush 5-40 mL  5-40 mL IntraVENous PRN    acetaminophen (TYLENOL) suppository 650 mg  650 mg Rectal Q6H PRN    polyethylene glycol (MIRALAX) packet 17 g  17 g Oral DAILY PRN    ondansetron (ZOFRAN ODT) tablet 4 mg  4 mg Oral Q8H PRN    Or    ondansetron (ZOFRAN) injection 4 mg  4 mg IntraVENous Q6H PRN    acetaminophen (TYLENOL) tablet 650 mg  650 mg Oral Q6H PRN     ______________________________________________________________________  EXPECTED LENGTH OF STAY: 5d 9h  ACTUAL LENGTH OF STAY:          3                 Anna Fulton MD

## 2022-01-08 NOTE — PROGRESS NOTES
Bedside shift change report given to Elliott Dunn RN (oncoming nurse) by Jude Saul RN (offgoing nurse). Report included the following information SBAR, Kardex, ED Summary, Intake/Output, MAR, Accordion, Recent Results, Med Rec Status and Cardiac Rhythm NSR.

## 2022-01-09 VITALS
WEIGHT: 208.11 LBS | HEART RATE: 105 BPM | DIASTOLIC BLOOD PRESSURE: 95 MMHG | BODY MASS INDEX: 29.86 KG/M2 | RESPIRATION RATE: 20 BRPM | SYSTOLIC BLOOD PRESSURE: 169 MMHG | OXYGEN SATURATION: 95 % | TEMPERATURE: 98.2 F

## 2022-01-09 LAB
ALBUMIN SERPL-MCNC: 2.7 G/DL (ref 3.5–5)
ANION GAP SERPL CALC-SCNC: 6 MMOL/L (ref 5–15)
BASOPHILS # BLD: 0 K/UL (ref 0–0.1)
BASOPHILS NFR BLD: 0 % (ref 0–1)
BUN SERPL-MCNC: 54 MG/DL (ref 6–20)
BUN/CREAT SERPL: 29 (ref 12–20)
CALCIUM SERPL-MCNC: 8.1 MG/DL (ref 8.5–10.1)
CHLORIDE SERPL-SCNC: 100 MMOL/L (ref 97–108)
CO2 SERPL-SCNC: 27 MMOL/L (ref 21–32)
CREAT SERPL-MCNC: 1.88 MG/DL (ref 0.7–1.3)
DIFFERENTIAL METHOD BLD: ABNORMAL
EOSINOPHIL # BLD: 0 K/UL (ref 0–0.4)
EOSINOPHIL NFR BLD: 0 % (ref 0–7)
ERYTHROCYTE [DISTWIDTH] IN BLOOD BY AUTOMATED COUNT: 12.7 % (ref 11.5–14.5)
GLUCOSE SERPL-MCNC: 119 MG/DL (ref 65–100)
HCT VFR BLD AUTO: 37 % (ref 36.6–50.3)
HGB BLD-MCNC: 11.8 G/DL (ref 12.1–17)
IMM GRANULOCYTES # BLD AUTO: 0 K/UL
IMM GRANULOCYTES NFR BLD AUTO: 0 %
LYMPHOCYTES # BLD: 0.9 K/UL (ref 0.8–3.5)
LYMPHOCYTES NFR BLD: 9 % (ref 12–49)
MAGNESIUM SERPL-MCNC: 2.1 MG/DL (ref 1.6–2.4)
MCH RBC QN AUTO: 28.4 PG (ref 26–34)
MCHC RBC AUTO-ENTMCNC: 31.9 G/DL (ref 30–36.5)
MCV RBC AUTO: 89.2 FL (ref 80–99)
MONOCYTES # BLD: 0.7 K/UL (ref 0–1)
MONOCYTES NFR BLD: 7 % (ref 5–13)
NEUTS BAND NFR BLD MANUAL: 3 % (ref 0–6)
NEUTS SEG # BLD: 8.3 K/UL (ref 1.8–8)
NEUTS SEG NFR BLD: 81 % (ref 32–75)
NRBC # BLD: 0 K/UL (ref 0–0.01)
NRBC BLD-RTO: 0 PER 100 WBC
PHOSPHATE SERPL-MCNC: 3.6 MG/DL (ref 2.6–4.7)
PLATELET # BLD AUTO: 314 K/UL (ref 150–400)
PLATELET COMMENTS,PCOM: ABNORMAL
PMV BLD AUTO: 10.7 FL (ref 8.9–12.9)
POTASSIUM SERPL-SCNC: 4.2 MMOL/L (ref 3.5–5.1)
RBC # BLD AUTO: 4.15 M/UL (ref 4.1–5.7)
RBC MORPH BLD: ABNORMAL
SODIUM SERPL-SCNC: 133 MMOL/L (ref 136–145)
WBC # BLD AUTO: 9.9 K/UL (ref 4.1–11.1)
WBC MORPH BLD: ABNORMAL

## 2022-01-09 PROCEDURE — 74011250637 HC RX REV CODE- 250/637: Performed by: INTERNAL MEDICINE

## 2022-01-09 PROCEDURE — 74011250637 HC RX REV CODE- 250/637: Performed by: STUDENT IN AN ORGANIZED HEALTH CARE EDUCATION/TRAINING PROGRAM

## 2022-01-09 PROCEDURE — 36415 COLL VENOUS BLD VENIPUNCTURE: CPT

## 2022-01-09 PROCEDURE — 74011250636 HC RX REV CODE- 250/636: Performed by: HOSPITALIST

## 2022-01-09 PROCEDURE — 94640 AIRWAY INHALATION TREATMENT: CPT

## 2022-01-09 PROCEDURE — 74011000250 HC RX REV CODE- 250: Performed by: INTERNAL MEDICINE

## 2022-01-09 PROCEDURE — 80069 RENAL FUNCTION PANEL: CPT

## 2022-01-09 PROCEDURE — 83735 ASSAY OF MAGNESIUM: CPT

## 2022-01-09 PROCEDURE — 85025 COMPLETE CBC W/AUTO DIFF WBC: CPT

## 2022-01-09 PROCEDURE — 74011000250 HC RX REV CODE- 250: Performed by: STUDENT IN AN ORGANIZED HEALTH CARE EDUCATION/TRAINING PROGRAM

## 2022-01-09 RX ORDER — CARVEDILOL 12.5 MG/1
12.5 TABLET ORAL 2 TIMES DAILY WITH MEALS
Qty: 60 TABLET | Refills: 0 | Status: SHIPPED | OUTPATIENT
Start: 2022-01-09 | End: 2022-02-17

## 2022-01-09 RX ORDER — CARVEDILOL 6.25 MG/1
12.5 TABLET ORAL 2 TIMES DAILY WITH MEALS
Status: DISCONTINUED | OUTPATIENT
Start: 2022-01-09 | End: 2022-01-09 | Stop reason: HOSPADM

## 2022-01-09 RX ADMIN — Medication 2000 UNITS: at 11:05

## 2022-01-09 RX ADMIN — ZINC SULFATE 220 MG (50 MG) CAPSULE 1 CAPSULE: CAPSULE at 11:05

## 2022-01-09 RX ADMIN — Medication 100 MG: at 11:05

## 2022-01-09 RX ADMIN — ENOXAPARIN SODIUM 90 MG: 100 INJECTION SUBCUTANEOUS at 11:05

## 2022-01-09 RX ADMIN — SODIUM CHLORIDE, PRESERVATIVE FREE 10 ML: 5 INJECTION INTRAVENOUS at 06:00

## 2022-01-09 RX ADMIN — AMLODIPINE BESYLATE 10 MG: 5 TABLET ORAL at 11:05

## 2022-01-09 RX ADMIN — BUMETANIDE 1 MG: 0.25 INJECTION INTRAMUSCULAR; INTRAVENOUS at 11:05

## 2022-01-09 RX ADMIN — METHYLPREDNISOLONE SODIUM SUCCINATE 60 MG: 125 INJECTION, POWDER, FOR SOLUTION INTRAMUSCULAR; INTRAVENOUS at 14:00

## 2022-01-09 RX ADMIN — OXYCODONE HYDROCHLORIDE AND ACETAMINOPHEN 500 MG: 500 TABLET ORAL at 11:05

## 2022-01-09 RX ADMIN — SODIUM CHLORIDE, PRESERVATIVE FREE 10 ML: 5 INJECTION INTRAVENOUS at 14:03

## 2022-01-09 RX ADMIN — ALBUTEROL SULFATE 2 PUFF: 90 AEROSOL, METERED RESPIRATORY (INHALATION) at 08:44

## 2022-01-09 RX ADMIN — METHYLPREDNISOLONE SODIUM SUCCINATE 60 MG: 125 INJECTION, POWDER, FOR SOLUTION INTRAMUSCULAR; INTRAVENOUS at 01:32

## 2022-01-09 RX ADMIN — CARVEDILOL 6.25 MG: 6.25 TABLET, FILM COATED ORAL at 12:01

## 2022-01-09 RX ADMIN — ALBUTEROL SULFATE 2 PUFF: 90 AEROSOL, METERED RESPIRATORY (INHALATION) at 13:31

## 2022-01-09 NOTE — PROGRESS NOTES
Transition of Care Plan  RUR 13%    COVID 19 Positive     Not vaccinated    Disposition  [address] 02   Referral sent to [place] [phone]   Spoke to [place],  on call [phone])  and was informed that delivery cannot be done today as insurance has to be verified tomorrow am..   Received call from Shanae that [facility] will pay for 3 months of home 02 for patient through [facility]  [phone]-- Referral sent via Care port. CM talked to Deluna, [phone] Tech on call and he is going to deliver the concentrator and portable tank  to the home. Patient's wife will transport patient home with the portable tank    Contact  Wife  Uma Marcos  [phone]    Cm received order for home 02. Patient has [place] and CM confirmed wit patient on the phone address and insurance. He said he will have a ride home when 02 is arranged    CM sent referral to [place] via [facility] and CM left message for on call Tech  [phone]),  regarding the referral.   Patient will need a portable delivered to the hospital prior to discharge. CM will continue to follow--  Waiting for response from [place] [employer] regarding acceptance and delivery of home 02    Transition of Care Plan: home 02    The Patient was provided with a choice of provider and agrees  with the discharge plan. Yes [x] No []  A Freedom of choice list was provided with basic dialogue that supports the patient's individualized plan of care/goals and shares the quality data associated with the providers. Yes [x] No []    UPDATE 11:30 am  CM spoke with on call Baylee Esquivel. He is not able to delivery home 02 and portable tanks until insurance ([employer]) is confirmed tomorrow am.      Mindy Lux, nurse, patient  And girlfriend, Jamie Florez, informed    [facility] 12:55 pm  CM was informed by Care Manager DIrector that [facility] will pay for 3 months of home 02 for patient. Referral was  sent to [address] [place] [address]-- [phone]. UPDATE 2:30 pm    CM called the the company on call tech, Meena Alvarado, and he is going to deliver the concentrator and portable tank to the patient's home along with the portable tank. Patient and Dr Abdirahman Pierre informed with this information.

## 2022-01-09 NOTE — PROGRESS NOTES
Discussed with the patient and all questioned fully answered. He will call me if any problems arise. Discharged to family vehicle via wheel chair on 2 l o2. Tolerated meds meals and daily care with no issues. IV removed.

## 2022-01-09 NOTE — DISCHARGE INSTRUCTIONS
Discharge Instructions       PATIENT ID: Andrews Gaines  MRN: [id number]   YOB: 1970    DATE OF ADMISSION: 1/4/2022  8:21 PM    DATE OF DISCHARGE: 1/9/2022    PRIMARY CARE PROVIDER: None     ATTENDING PHYSICIAN: Khoa Munguia MD  DISCHARGING PROVIDER: Paula Lopes MD    To contact this individual call [phone] and ask the  to page. If unavailable ask to be transferred the Adult Hospitalist Department. DISCHARGE DIAGNOSES   COVID-19 pneumonia  Acute respiratory failure with hypoxia  Acute kidney injury, chronic kidney disease  Hypertension    CONSULTATIONS: IP CONSULT TO NEPHROLOGY    PROCEDURES/SURGERIES: * No surgery found *    PENDING TEST RESULTS:   At the time of discharge the following test results are still pending: n/a    FOLLOW UP APPOINTMENTS:   Follow-up Information     Follow up With Specialties Details Why [facility]  Schedule an appointment as soon as possible for a visit in 3 days please make an appointment with the primary care physician of your choice (any practice) [address]  [phone]           ADDITIONAL CARE RECOMMENDATIONS:   You were admitted with COVID-19 pneumonia which has improved, however, your oxygen levels are still low. For this reason, home oxygen has been arranged for you. Follow-up with a primary care physician to determine when it's ok to come off the oxygen. Your blood pressure medications have been changed because of your kidney function. Follow-up with a primary care physician to make adjustments to your blood pressure medications as needed. You are being started on a blood thinner because you had a scan of your chest which could NOT rule out a blood clot. For this reason, we have decided to treat it as if you do have a blood clot. You are being prescribed a blood thinner and should be treated for at least 3 months.  There is an increased risk for bleeding while on a blood thinner. You will need to follow-up with your primary care doctor to get refills after the month's supply is over. You will also need to continue quarantining for COVID to avoid infecting others. The CDC's guidelines change frequently. For now, isolate yourself for 10 days or until cleared by your primary care physician. See CDC guidelines: \"People who are severely ill with COVID-19 (including those who were hospitalized or required intensive care or ventilation support) and people with compromised immune systems might need to isolate at home longer. They may also require testing with a viral test to determine when they can be around others. CDC recommends an isolation period of at least 10 and up to 20 days for people who were severely ill with COVID-19 and for people with weakened immune systems. Consult with your healthcare provider about when you can resume being around other people. \"    DIET: Regular Diet; low sodium    ACTIVITY: Activity as tolerated      DISCHARGE MEDICATIONS:   See Medication Reconciliation Form    · It is important that you take the medication exactly as they are prescribed. · Keep your medication in the bottles provided by the pharmacist and keep a list of the medication names, dosages, and times to be taken in your wallet. · Do not take other medications without consulting your doctor. NOTIFY YOUR PHYSICIAN FOR ANY OF THE FOLLOWING:   Fever over 101 degrees for 24 hours. Chest pain, shortness of breath, fever, chills, nausea, vomiting, diarrhea, change in mentation, falling, weakness, bleeding. Severe pain or pain not relieved by medications. Or, any other signs or symptoms that you may have questions about.       DISPOSITION:   x Home With:   OT  PT  TALI  RN       SNF/Inpatient Rehab/LTAC    Independent/assisted living    Hospice    Other:       Signed:   Era Nix MD  1/9/2022  11:06 AM

## 2022-01-09 NOTE — DISCHARGE SUMMARY
Discharge Summary     Patient: Stefani Chacon MRN: [id number]  SSN: [id number]    YOB: 1970  Age: 45 y.o. Sex: male       Admit Date: 1/4/2022    Discharge Date: 1/9/2022      Admission Diagnoses: COVID-19 [U07.1]    Discharge Diagnoses:   Acute respiratory failure with hypoxia  COVID-19 pneumonia  RUTH  CKD III   HTN  Obesity    Discharge Condition: Stable    Hospital Course: the patient presented with shortness of breath and was found to have COVID 19 pneumonia with hypoxia. He was treated with IV steroids and gradually improved. He underwent a V/Q scan which was indeterminate for PE. This study is not reliable with the infection, however with the result and hypercoagulable state caused by COVID, we opted to empirically treat it with Eliquis. He did not receive a CTA due to RUTH and CKD. Disposition: home    Discharge Medications:   Current Discharge Medication List      START taking these medications    Details   carvediloL (COREG) 12.5 mg tablet Take 1 Tablet by mouth two (2) times daily (with meals). Qty: 60 Tablet, Refills: 0      apixaban (ELIQUIS) 5 mg (74 tabs) starter pack Take 10 mg (two 5 mg tablets) by mouth twice a day for 7 days   Followed by 5 mg (one 5 mg tablet) by mouth twice a day  Qty: 1 Dose Pack, Refills: 0         CONTINUE these medications which have NOT CHANGED    Details   amLODIPine (NORVASC) 10 mg tablet Take 1 Tab by mouth daily. Qty: 90 Tab, Refills: 3    Associated Diagnoses: Uncontrolled hypertension         STOP taking these medications       triamterene (DYRENIUM) 50 mg capsule Comments:   Reason for Stopping:         lisinopril-hydroCHLOROthiazide (PRINZIDE, ZESTORETIC) 20-25 mg per tablet Comments:   Reason for Stopping:              Follow-up Information     Follow up With Specialties Details Why [facility]  Schedule an appointment as soon as possible for a visit in 3 days please make an appointment with the primary care physician of your choice (any practice) [facility] [address] [facility]            Signed By: Katia العلي MD     January 9, 2022      Greater than 30 minutes spent on discharge management.

## 2022-01-09 NOTE — PROGRESS NOTES
[facility]  Hospitalist Group                                                                                          Hospitalist Progress Note  Aba Peterson MD  Answering service: [phone] OR [phone] from in house phone        Date of Service:  2022  NAME:  Christian Martines  :  1970  MRN:  [id number]      Admission Summary: Campos Rodgers is a 45 y.o. male with hx of htn, ckd iii who presented to ED with complaints of hemoptysis, generalized body aches, fatigue, malaise myalgias, chills and shortness of breath. He was subsequently admitted for acute hypoxic respiratory failure secondary to COVID-19 pneumonia and was placed on appropriate maximum medical therapy. Patient was noted to have increased oxygen requirement requiring mid flow to high flow oxygen and was therefore transferred to [facility] for higher level of care. He is seen and examined at bedside and has no acute complaints or concerns. Denies any symptoms while resting. \"    Interval history / Subjective:   Feels better today, breathing better, denies pain, n/v/d. Wants to go home. Assessment & Plan:     Acute respiratory failure with hypoxia  Due to COVID-19 pneumonia  Stable on 2L O2  VQ scan is intermediate for PE - continue therapeutic Lovenox for now, will transition to PO Eliquis  Continue IV methylprednisolone, will reduce dose to 40 mg BID    COVID-19 pneumonia  Not vaccinated. As above     RUTH on CKD stage III  Likely prerenal, due to dehydration. Baseline creatinine 1.7. Nonoliguric, electrolytes WNL. Monitor volume status atelectasis, replace electrolytes as needed. Renally dose medications, avoid nephrotoxic meds. Continue IV bumetanide    Hypertension  Controlled, continue Coreg and amlodipine. Hold ACE and HCTZ   Resume ACE and ARB once appropriate.     Monitor closely, as needed IV hydralazine.        Obesity  BMI 35.0      Code status: Full  DVT prophylaxis: lovenox fully anticoagulated for PE    Care Plan discussed with: Patient/Family  Anticipated Disposition: Home w/Family  Anticipated Discharge: home once O2 arranged     Hospital Problems  Date Reviewed: 3/29/2021          Codes Class Noted POA    COVID-19 ICD-10-CM: U07.1  ICD-9-CM: 079.89  1/5/2022 Unknown                Review of Systems:   Pertinent items are noted in HPI. Vital Signs:    Last 24hrs VS reviewed since prior progress note. Most recent are:  Visit Vitals  BP (!) 169/95 (BP 1 Location: Left upper arm, BP Patient Position: At rest;Sitting)   Pulse (!) 105   Temp 98.2 °F (36.8 °C)   Resp 20   Wt 94.4 kg (208 lb 1.8 oz)   SpO2 95%   BMI 29.86 kg/m²       No intake or output data in the 24 hours ending 01/09/22 1438     Physical Examination:     I had a face to face encounter with this patient and independently examined them on 1/9/2022 as outlined below:          Constitutional:  No acute distress, cooperative, pleasant    ENT:  Oral mucosa moist, oropharynx benign. Resp:  CTA b/l No wheezing/rhonchi/rales. CV:  Regular rhythm, normal rate, no murmurs, gallops, rubs    GI:  Soft, non distended, non tender. normoactive bowel sounds     Musculoskeletal:  No edema, warm, 2+ pulses throughout    Neurologic:  Moves all extremities.   AAOx3, CN II-XII reviewed            Data Review:    Review and/or order of clinical lab test  Review and/or order of tests in the radiology section of CPT  Review and/or order of tests in the medicine section of CPT      Labs:     Recent Labs     01/09/22 0136 01/08/22  0652   WBC 9.9 11.3*   HGB 11.8* 11.9*   HCT 37.0 36.1*    360     Recent Labs     01/09/22  0136 01/08/22  0652 01/08/22  0248   * 133* 126*   K 4.2 4.0 HEMOLYZED,RECOLLECT REQUESTED    100 99   CO2 27 26 21   BUN 54* 51* 53*   CREA 1.88* 1.80* 1.92*   * 163* 176*   CA 8.1* 8.0* 7.8*   MG 2.1  --  2.2   PHOS 3.6  --  3.8     Recent Labs     01/09/22  0136 01/08/22  0248   ALT  --  66 AP  --  48   TBILI  --  0.8   TP  --  7.0   ALB 2.7* 2.4*   GLOB  --  4.6*     No results for input(s): INR, PTP, APTT, INREXT, INREXT in the last 72 hours. No results for input(s): FE, TIBC, PSAT, FERR in the last 72 hours. No results found for: FOL, RBCF   No results for input(s): PH, PCO2, PO2 in the last 72 hours. No results for input(s): CPK, CKNDX, TROIQ in the last 72 hours.     No lab exists for component: CPKMB  Lab Results   Component Value Date/Time    Cholesterol, total 199 03/08/2021 01:10 PM    HDL Cholesterol 50 03/08/2021 01:10 PM    LDL, calculated 124.2 (H) 03/08/2021 01:10 PM    Triglyceride 124 03/08/2021 01:10 PM    CHOL/HDL Ratio 4.0 03/08/2021 01:10 PM     Lab Results   Component Value Date/Time    Glucose (POC) 129 (H) 01/01/2022 01:03 PM     Lab Results   Component Value Date/Time    Color DARK YELLOW 12/31/2021 09:26 PM    Appearance CLEAR 12/31/2021 09:26 PM    Specific gravity 1.015 12/31/2021 09:26 PM    pH (UA) 6.0 12/31/2021 09:26 PM    Protein 100 (A) 12/31/2021 09:26 PM    Glucose Negative 12/31/2021 09:26 PM    Ketone 15 (A) 12/31/2021 09:26 PM    Bilirubin Negative 12/31/2021 09:26 PM    Urobilinogen >8.0 (H) 12/31/2021 09:26 PM    Nitrites Negative 12/31/2021 09:26 PM    Leukocyte Esterase Negative 12/31/2021 09:26 PM    Epithelial cells FEW 12/31/2021 09:26 PM    Bacteria Negative 12/31/2021 09:26 PM    WBC 0-4 12/31/2021 09:26 PM    RBC 5-10 12/31/2021 09:26 PM         Medications Reviewed:     Current Facility-Administered Medications   Medication Dose Route Frequency    methylPREDNISolone (PF) (Solu-MEDROL) injection 60 mg  60 mg IntraVENous Q12H    bumetanide (BUMEX) injection 1 mg  1 mg IntraVENous DAILY    enoxaparin (LOVENOX) injection 90 mg  90 mg SubCUTAneous Q12H    amLODIPine (NORVASC) tablet 10 mg  10 mg Oral DAILY    sodium chloride (OCEAN) 0.65 % nasal squeeze bottle 2 Spray  2 Spray Both Nostrils Q2H PRN    albuterol (PROVENTIL HFA, VENTOLIN HFA, PROAIR HFA) inhaler 2 Puff  2 Puff Inhalation QID RT    ascorbic acid (vitamin C) (VITAMIN C) tablet 500 mg  500 mg Oral BID    benzonatate (TESSALON) capsule 100 mg  100 mg Oral TID PRN    carvediloL (COREG) tablet 6.25 mg  6.25 mg Oral BID WITH MEALS    cholecalciferol (VITAMIN D3) (1000 Units /25 mcg) tablet 2,000 Units  2,000 Units Oral DAILY    cloNIDine HCL (CATAPRES) tablet 0.1 mg  0.1 mg Oral Q6H PRN    guaiFENesin-dextromethorphan (ROBITUSSIN DM) 100-10 mg/5 mL syrup 10 mL  10 mL Oral Q4H PRN    melatonin tablet 9 mg  9 mg Oral QHS PRN    zinc sulfate (ZINCATE) 50 mg zinc (220 mg) capsule 1 Capsule  1 Capsule Oral DAILY    thiamine HCL (B-1) tablet 100 mg  100 mg Oral DAILY    sodium chloride (NS) flush 5-40 mL  5-40 mL IntraVENous Q8H    sodium chloride (NS) flush 5-40 mL  5-40 mL IntraVENous PRN    acetaminophen (TYLENOL) suppository 650 mg  650 mg Rectal Q6H PRN    polyethylene glycol (MIRALAX) packet 17 g  17 g Oral DAILY PRN    ondansetron (ZOFRAN ODT) tablet 4 mg  4 mg Oral Q8H PRN    Or    ondansetron (ZOFRAN) injection 4 mg  4 mg IntraVENous Q6H PRN    acetaminophen (TYLENOL) tablet 650 mg  650 mg Oral Q6H PRN     ______________________________________________________________________  EXPECTED LENGTH OF STAY: 5d 9h  ACTUAL LENGTH OF STAY:          4                 Jenny Narvaez MD

## 2022-01-10 ENCOUNTER — PATIENT OUTREACH (OUTPATIENT)
Dept: CASE MANAGEMENT | Age: 52
End: 2022-01-10

## 2022-01-10 NOTE — PROGRESS NOTES
Care Transitions Outreach Attempt    Call within 2 business days of discharge: Yes   Attempted to reach patient for transitions of care follow up. Unable to reach patient. Left VM asking for return call x2. CTN will make 3rd attempt to reach in 2-3 business days. Patient: Adrian Bronson Patient : 1970 MRN: [id number]    Last Discharge [address]       Complaint Diagnosis Description Type Department Provider    22   Admission (Discharged) [facility] Rigo Vo MD            Was this an external facility discharge? No       Noted following upcoming appointments from discharge chart review:   [facility] follow up appointment(s):   Future Appointments   Date Time Provider Adrienne Bazan   2022  8:00 AM Amrita Broussard MD [facility] BS AMB   above appt is for [facility] and establish care with new PCP.      Non-BS follow up appointment(s): [facility]OMAS

## 2022-01-13 ENCOUNTER — OFFICE VISIT (OUTPATIENT)
Dept: INTERNAL MEDICINE CLINIC | Age: 52
End: 2022-01-13
Payer: MEDICAID

## 2022-01-13 VITALS
SYSTOLIC BLOOD PRESSURE: 140 MMHG | TEMPERATURE: 98.4 F | BODY MASS INDEX: 34.65 KG/M2 | RESPIRATION RATE: 20 BRPM | HEIGHT: 70 IN | HEART RATE: 85 BPM | DIASTOLIC BLOOD PRESSURE: 88 MMHG | OXYGEN SATURATION: 93 % | WEIGHT: 242 LBS

## 2022-01-13 DIAGNOSIS — N18.32 STAGE 3B CHRONIC KIDNEY DISEASE (HCC): ICD-10-CM

## 2022-01-13 DIAGNOSIS — M54.42 CHRONIC BILATERAL LOW BACK PAIN WITH BILATERAL SCIATICA: ICD-10-CM

## 2022-01-13 DIAGNOSIS — G89.29 CHRONIC BILATERAL LOW BACK PAIN WITH BILATERAL SCIATICA: ICD-10-CM

## 2022-01-13 DIAGNOSIS — I10 ESSENTIAL HYPERTENSION: ICD-10-CM

## 2022-01-13 DIAGNOSIS — M54.41 CHRONIC BILATERAL LOW BACK PAIN WITH BILATERAL SCIATICA: ICD-10-CM

## 2022-01-13 DIAGNOSIS — U07.1 COVID-19: Primary | ICD-10-CM

## 2022-01-13 DIAGNOSIS — R09.89 SUSPECTED PULMONARY EMBOLISM: ICD-10-CM

## 2022-01-13 PROCEDURE — 99204 OFFICE O/P NEW MOD 45 MIN: CPT | Performed by: INTERNAL MEDICINE

## 2022-01-13 RX ORDER — AMLODIPINE BESYLATE 10 MG/1
10 TABLET ORAL DAILY
COMMUNITY
Start: 2021-03-08 | End: 2022-01-13

## 2022-01-13 RX ORDER — BACLOFEN 10 MG/1
10 TABLET ORAL
COMMUNITY
Start: 2021-09-30 | End: 2022-02-17

## 2022-01-13 RX ORDER — AMLODIPINE BESYLATE 10 MG/1
10 TABLET ORAL DAILY
Qty: 30 TABLET | Refills: 2 | Status: SHIPPED | OUTPATIENT
Start: 2022-01-13 | End: 2022-02-17 | Stop reason: SDUPTHER

## 2022-01-13 RX ORDER — METHYLPREDNISOLONE 4 MG/1
TABLET ORAL
COMMUNITY
Start: 2021-08-17 | End: 2022-02-17

## 2022-01-13 RX ORDER — LISINOPRIL AND HYDROCHLOROTHIAZIDE 20; 25 MG/1; MG/1
1 TABLET ORAL DAILY
COMMUNITY
Start: 2021-03-08 | End: 2022-02-17

## 2022-01-13 RX ORDER — FAMOTIDINE 20 MG/1
TABLET, FILM COATED ORAL
COMMUNITY
Start: 2021-07-13 | End: 2022-02-17

## 2022-01-13 NOTE — PATIENT INSTRUCTIONS
internetstores Activation    Thank you for requesting access to internetstores. Please follow the instructions below to securely access and download your online medical record. internetstores allows you to send messages to your doctor, view your test results, renew your prescriptions, schedule appointments, and more. How Do I Sign Up? 1. In your internet browser, go to [email or web address]  2. Click on the First Time User? Click Here link in the Sign In box. You will be redirect to the New Member Sign Up page. 3. Enter your internetstores Access Code exactly as it appears below. You will not need to use this code after youve completed the sign-up process. If you do not sign up before the expiration date, you must request a new code. internetstores Access Code: [email or web address]  Expires: 2022 12:19 PM (This is the date your internetstores access code will )    4. Enter the last four digits of your Social Security Number (xxxx) and Date of Birth (mm/dd/yyyy) as indicated and click Submit. You will be taken to the next sign-up page. 5. Create a internetstores ID. This will be your internetstores login ID and cannot be changed, so think of one that is secure and easy to remember. 6. Create a internetstores password. You can change your password at any time. 7. Enter your Password Reset Question and Answer. This can be used at a later time if you forget your password. 8. Enter your e-mail address. You will receive e-mail notification when new information is available in [address]. 9. Click Sign Up. You can now view and download portions of your medical record. 10. Click the Download Summary menu link to download a portable copy of your medical information. Additional Information    If you have questions, please visit the Frequently Asked Questions section of the internetstores website at [email or web address] Remember, internetstores is NOT to be used for urgent needs. For medical emergencies, dial 911.

## 2022-01-13 NOTE — PROGRESS NOTES
Chief Complaint   Patient presents with   [facility] Follow Up     1. Have you been to the ER, urgent care clinic since your last visit? Hospitalized since your last visit? Yes Reason for visit: covid    2. Have you seen or consulted any other health care providers outside of the [address] [place] [address] since your last visit? Include any pap smears or colon screening.  No

## 2022-01-13 NOTE — PROGRESS NOTES
Office Visit Note:    Assessment/Plan:  1. COVID-19    2. Essential hypertension    3. Suspected pulmonary embolism    4. Stage 3b chronic kidney disease ([address]    5. Chronic bilateral low back pain with bilateral sciatica      1. 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 Apparently he sleeps during the day and is awake most of the night, I have advised him to regulate his sleep. Advised to take over-the-counter melatonin as needed. At this time he does not need oxygen he is saturating 93% on room air. I will advised him to slowly increase his exercise tolerance. 2. Suspected PE-they did not do a CT scan because of his CKD but did do a VQ scan which showed indeterminate for PE, already started on Eliquis, will advised to complete 3 months of Eliquis and then stop it. 3. Hypertension-blood pressure now above goal but he was recently in the hospital, will continue on Coreg and amlodipine for now. We will also defer this to the nephrologist who will be seeing him  4. CKD stage III-his baseline creatinine is approximately been 1.8 for the last several years, etiology of his CKD is not clear, will refer him to nephrology  5. Chronic back pain-follows with orthopedics  6. We will follow-up with him in about a month and will discuss healthcare maintenance in detail in the next visit. Orders Placed This Encounter    REFERRAL TO NEPHROLOGY     Referral Priority:   Routine     Referral Type:   Consultation     Referral Reason:   Specialty Services Required     Referred to Provider:   Danni Sousa MD     Requested Specialty:   Nephrology     Number of Visits Requested:   1    baclofen (LIORESAL) 10 mg tablet     Sig: Take 10 mg by mouth two (2) times daily as needed.     famotidine (PEPCID) 20 mg tablet     Sig: TAKE 1 TABLET BY MOUTH TWICE DAILY FOR ACID REFLUX    lisinopril-hydroCHLOROthiazide (PRINZIDE, ZESTORETIC) 20-25 mg per tablet     Sig: Take 1 Tablet by mouth daily.  methylPREDNISolone (MEDROL DOSEPACK) 4 mg tablet     Sig: Take as directed on package instructions.  DISCONTD: amLODIPine (NORVASC) 10 mg tablet     Sig: Take 10 mg by mouth daily.  amLODIPine (NORVASC) 10 mg tablet     Sig: Take 1 Tablet by mouth daily. Dispense:  30 Tablet     Refill:  2    apixaban (Eliquis) 5 mg tablet     Sig: Take 1 Tablet by mouth two (2) times a day for 60 days. Dispense:  60 Tablet     Refill:  1     Social Determinants of Health     Tobacco Use: Low Risk     Smoking Tobacco Use: Never Smoker    Smokeless Tobacco Use: Never Used   Alcohol Use:     Frequency of Alcohol Consumption: Not on file    Average Number of Drinks: Not on file    Frequency of Binge Drinking: Not on file   Financial Resource Strain:     Difficulty of Paying Living Expenses: Not on file   Food Insecurity:     Worried About Running Out of Food in the Last Year: Not on file    Jerilyn of Food in the Last Year: Not on file   Transportation Needs:     Lack of Transportation (Medical): Not on file    Lack of Transportation (Non-Medical):  Not on file   Physical Activity:     Days of Exercise per Week: Not on file    Minutes of Exercise per Session: Not on file   Stress:     Feeling of Stress : Not on file   Social Connections:     Frequency of Communication with Friends and Family: Not on file    Frequency of Social Gatherings with Friends and Family: Not on file    Attends Yarsani Services: Not on file    Active Member of Clubs or Organizations: Not on file    Attends Club or Organization Meetings: Not on file    Marital Status: Not on file   Intimate Partner Violence:     Fear of Current or Ex-Partner: Not on file    Emotionally Abused: Not on file    Physically Abused: Not on file    Sexually Abused: Not on file   Depression: Not at risk    PHQ-2 Score: 0   Housing Stability:     Unable to Pay for Housing in the Last Year: Not on file    Number of Places Lived in the Last Year: Not on file    Unstable Housing in the Last Year: Not on file       Follow-up and Dispositions    · Return in about 1 month (around 2/13/2022). I have reviewed with the patient details of the assessment and plan and all questions were answered. Relevant patient education was performed. The most recent lab findings were reviewed with the patient. An After Visit Summary was printed and given to the patient. Reason for Visit: [id number] [facility] Follow Up    Subjective:  79-year-old male with history of hypertension, chronic back pain and CKD comes to establish with me as a primary care physician he is an unvaccinated male who was recently diagnosed with COVID-19 and was admitted to the hospital and was put on supplemental oxygen and steroids. In the hospital he had a VQ scan done for PE which showed indeterminate and so he was put on Eliquis. He was discharged on 1/4/2022 and states that he is slowly starting to improve. As per his wife apparently he is is getting confused off and on. He states his breathing is close to baseline and denies any other complaints to me. Review of Systems  A complete 11 system ROS was preformed (constitutional, eyes, ENT, cardiovascular, respiratory, gastrointestinal, genitourinary, musculoskeletal, skin, neurological, psychiatric) and was negative aside from the pertinent positives and negatives noted in the HPI. Past Medical History:   Diagnosis Date    Hypertension      No past surgical history on file. Social History     Socioeconomic History    Marital status:    Tobacco Use    Smoking status: Never Smoker    Smokeless tobacco: Never Used   Vaping Use    Vaping Use: Never used   Substance and Sexual Activity    Alcohol use: Not Currently    Drug use: Never     History reviewed. No pertinent family history.   Current Outpatient Medications   Medication Sig Dispense Refill    baclofen (LIORESAL) 10 mg tablet Take 10 mg by mouth two (2) times daily as needed.  famotidine (PEPCID) 20 mg tablet TAKE 1 TABLET BY MOUTH TWICE DAILY FOR ACID REFLUX      lisinopril-hydroCHLOROthiazide (PRINZIDE, ZESTORETIC) 20-25 mg per tablet Take 1 Tablet by mouth daily.  methylPREDNISolone (MEDROL DOSEPACK) 4 mg tablet Take as directed on package instructions.  amLODIPine (NORVASC) 10 mg tablet Take 1 Tablet by mouth daily. 30 Tablet 2    apixaban (Eliquis) 5 mg tablet Take 1 Tablet by mouth two (2) times a day for 60 days. 60 Tablet 1    carvediloL (COREG) 12.5 mg tablet Take 1 Tablet by mouth two (2) times daily (with meals). 60 Tablet 0     No Known Allergies    Objective:  Visit Vitals  BP (!) 140/88 (BP Patient Position: Sitting, BP Cuff Size: Adult)   Pulse 85   Temp 98.4 °F (36.9 °C) (Oral)   Resp 20   Ht 5' 10\" (1.778 m)   Wt 242 lb (109.8 kg)   SpO2 93%   BMI 34.72 kg/m²     Physical Exam:   AA&O x3. Not pale, not in any distress. HEENT: ENT negative. Neck: Supple, no JVD or lymphadenopathy. Lungs: clear  Heart: S1 S2 +, RRR  Abdomen: Soft, No tenderness  Neuro: No focal deficits. Skin: No erythema or lesions noted. Extremities: no pedal edema, good peripheral pulses  Psych: Normal affect and mood. Results for orders placed or performed during the hospital encounter of 01/04/22   URINE CULTURE HOLD SAMPLE    Specimen: Serum   Result Value Ref Range    Urine culture hold        Urine on hold in Microbiology dept for 2 days. If unpreserved urine is submitted, it cannot be used for addtional testing after 24 hours, recollection will be required. CBC WITH AUTOMATED DIFF   Result Value Ref Range    WBC 6.9 4.1 - 11.1 K/uL    RBC 4.39 4. 10 - 5.70 M/uL    HGB 12.5 12.1 - 17.0 g/dL    HCT 38.9 36.6 - 50.3 %    MCV 88.6 80.0 - 99.0 FL    MCH 28.5 26.0 - 34.0 PG    MCHC 32.1 30.0 - 36.5 g/dL    RDW 13.9 11.5 - 14.5 %    PLATELET 727 857 - 774 K/uL    MPV 11.5 8.9 - 12.9 FL    NRBC 0.0 0 PER 100 WBC    ABSOLUTE NRBC 0.00 0.00 - 0.01 K/uL    NEUTROPHILS 87 (H) 32 - 75 %    LYMPHOCYTES 7 (L) 12 - 49 %    MONOCYTES 6 5 - 13 %    EOSINOPHILS 0 0 - 7 %    BASOPHILS 0 0 - 1 %    IMMATURE GRANULOCYTES 0 %    ABS. NEUTROPHILS 6.0 1.8 - 8.0 K/UL    ABS. LYMPHOCYTES 0.5 (L) 0.8 - 3.5 K/UL    ABS. MONOCYTES 0.4 0.0 - 1.0 K/UL    ABS. EOSINOPHILS 0.0 0.0 - 0.4 K/UL    ABS. BASOPHILS 0.0 0.0 - 0.1 K/UL    ABS. IMM. GRANS. 0.0 K/UL    DF MANUAL      PLATELET COMMENTS Large Platelets      RBC COMMENTS NORMOCYTIC, NORMOCHROMIC     METABOLIC PANEL, COMPREHENSIVE   Result Value Ref Range    Sodium 123 (L) 136 - 145 mmol/L    Potassium HEMOLYZED,RECOLLECT REQUESTED 3.5 - 5.1 mmol/L    Chloride 101 97 - 108 mmol/L    CO2 22 21 - 32 mmol/L    Anion gap 0 (L) 5 - 15 mmol/L    Glucose 130 (H) 65 - 100 mg/dL    BUN 49 (H) 6 - 20 MG/DL    Creatinine 2.03 (H) 0.70 - 1.30 MG/DL    BUN/Creatinine ratio 24 (H) 12 - 20      GFR est AA 42 (L) >60 ml/min/1.73m2    GFR est non-AA 35 (L) >60 ml/min/1.73m2    Calcium 8.4 (L) 8.5 - 10.1 MG/DL    Bilirubin, total HEMOLYZED,RECOLLECT REQUESTED 0.2 - 1.0 MG/DL    ALT (SGPT) HEMOLYZED,RECOLLECT REQUESTED 12 - 78 U/L    AST (SGOT) HEMOLYZED,RECOLLECT REQUESTED 15 - 37 U/L    Alk.  phosphatase 47 45 - 117 U/L    Protein, total 8.4 (H) 6.4 - 8.2 g/dL    Albumin 2.0 (L) 3.5 - 5.0 g/dL    Globulin 6.4 (H) 2.0 - 4.0 g/dL    A-G Ratio 0.3 (L) 1.1 - 2.2     POC G3 - PUL   Result Value Ref Range    FIO2 (POC) 91 %    pH (POC) 7.43 7.35 - 7.45      pCO2 (POC) 38.0 35.0 - 45.0 MMHG    pO2 (POC) 71 (L) 80 - 100 MMHG    HCO3 (POC) 25.4 22 - 26 MMOL/L    sO2 (POC) 94.6 92 - 97 %    Base excess (POC) 1.3 mmol/L    Site LEFT RADIAL      Device: High Flow Nasal Cannula      Allens test (POC) Positive      Specimen type (POC) ARTERIAL     METABOLIC PANEL, BASIC   Result Value Ref Range    Sodium 137 136 - 145 mmol/L    Potassium 4.0 3.5 - 5.1 mmol/L    Chloride 105 97 - 108 mmol/L    CO2 24 21 - 32 mmol/L Anion gap 8 5 - 15 mmol/L    Glucose 137 (H) 65 - 100 mg/dL    BUN 52 (H) 6 - 20 MG/DL    Creatinine 2.06 (H) 0.70 - 1.30 MG/DL    BUN/Creatinine ratio 25 (H) 12 - 20      GFR est AA 41 (L) >60 ml/min/1.73m2    GFR est non-AA 34 (L) >60 ml/min/1.73m2    Calcium 8.3 (L) 8.5 - 10.1 MG/DL   SODIUM, UR, RANDOM   Result Value Ref Range    Sodium,urine random 13 MMOL/L   CREATININE, UR, RANDOM   Result Value Ref Range    Creatinine, urine 184.00 mg/dL   CHLORIDE, URINE RANDOM   Result Value Ref Range    Chloride,urine random <10 MMOL/L   C REACTIVE PROTEIN, QT   Result Value Ref Range    C-Reactive protein 3.47 (H) 0.00 - 3.86 mg/dL   METABOLIC PANEL, BASIC   Result Value Ref Range    Sodium 136 136 - 145 mmol/L    Potassium 4.1 3.5 - 5.1 mmol/L    Chloride 103 97 - 108 mmol/L    CO2 24 21 - 32 mmol/L    Anion gap 9 5 - 15 mmol/L    Glucose 119 (H) 65 - 100 mg/dL    BUN 55 (H) 6 - 20 MG/DL    Creatinine 2.17 (H) 0.70 - 1.30 MG/DL    BUN/Creatinine ratio 25 (H) 12 - 20      GFR est AA 39 (L) >60 ml/min/1.73m2    GFR est non-AA 32 (L) >60 ml/min/1.73m2    Calcium 8.2 (L) 8.5 - 10.1 MG/DL   CBC WITH AUTOMATED DIFF   Result Value Ref Range    WBC 9.2 4.1 - 11.1 K/uL    RBC 4.23 4. 10 - 5.70 M/uL    HGB 12.0 (L) 12.1 - 17.0 g/dL    HCT 38.2 36.6 - 50.3 %    MCV 90.3 80.0 - 99.0 FL    MCH 28.4 26.0 - 34.0 PG    MCHC 31.4 30.0 - 36.5 g/dL    RDW 13.1 11.5 - 14.5 %    PLATELET 702 374 - 086 K/uL    MPV 11.5 8.9 - 12.9 FL    NRBC 0.0 0  WBC    ABSOLUTE NRBC 0.00 0.00 - 0.01 K/uL    NEUTROPHILS 74 32 - 75 %    LYMPHOCYTES 19 12 - 49 %    MONOCYTES 7 5 - 13 %    EOSINOPHILS 0 0 - 7 %    BASOPHILS 0 0 - 1 %    IMMATURE GRANULOCYTES 0 %    ABS. NEUTROPHILS 6.9 1.8 - 8.0 K/UL    ABS. LYMPHOCYTES 1.7 0.8 - 3.5 K/UL    ABS. MONOCYTES 0.6 0.0 - 1.0 K/UL    ABS. EOSINOPHILS 0.0 0.0 - 0.4 K/UL    ABS. BASOPHILS 0.0 0.0 - 0.1 K/UL    ABS. IMM.  GRANS. 0.0 K/UL    DF MANUAL      PLATELET COMMENTS Large Platelets      RBC COMMENTS NORMOCYTIC, NORMOCHROMIC      WBC COMMENTS ATYPICAL LYMPHOCYTES PRESENT     PHOSPHORUS   Result Value Ref Range    Phosphorus 3.8 2.6 - 4.7 MG/DL   CBC WITH AUTOMATED DIFF   Result Value Ref Range    WBC 11.4 (H) 4.1 - 11.1 K/uL    RBC 4.38 4.10 - 5.70 M/uL    HGB 12.6 12.1 - 17.0 g/dL    HCT 40.2 36.6 - 50.3 %    MCV 91.8 80.0 - 99.0 FL    MCH 28.8 26.0 - 34.0 PG    MCHC 31.3 30.0 - 36.5 g/dL    RDW 12.8 11.5 - 14.5 %    PLATELET 178 814 - 449 K/uL    MPV 10.6 8.9 - 12.9 FL    NRBC 0.0 0  WBC    ABSOLUTE NRBC 0.00 0.00 - 0.01 K/uL    NEUTROPHILS 88 (H) 32 - 75 %    BAND NEUTROPHILS 1 0 - 6 %    LYMPHOCYTES 6 (L) 12 - 49 %    MONOCYTES 5 5 - 13 %    EOSINOPHILS 0 0 - 7 %    BASOPHILS 0 0 - 1 %    IMMATURE GRANULOCYTES 0 %    ABS. NEUTROPHILS 10.1 (H) 1.8 - 8.0 K/UL    ABS. LYMPHOCYTES 0.7 (L) 0.8 - 3.5 K/UL    ABS. MONOCYTES 0.6 0.0 - 1.0 K/UL    ABS. EOSINOPHILS 0.0 0.0 - 0.4 K/UL    ABS. BASOPHILS 0.0 0.0 - 0.1 K/UL    ABS. IMM. GRANS. 0.0 K/UL    DF MANUAL      PLATELET COMMENTS Large Platelets      RBC COMMENTS NORMOCYTIC, NORMOCHROMIC      WBC COMMENTS HYPERSEGMENTED POLYS     CBC W/O DIFF   Result Value Ref Range    WBC 11.3 (H) 4.1 - 11.1 K/uL    RBC 4.10 4. 10 - 5.70 M/uL    HGB 11.9 (L) 12.1 - 17.0 g/dL    HCT 36.1 (L) 36.6 - 50.3 %    MCV 88.0 80.0 - 99.0 FL    MCH 29.0 26.0 - 34.0 PG    MCHC 33.0 30.0 - 36.5 g/dL    RDW 12.8 11.5 - 14.5 %    PLATELET 088 116 - 604 K/uL    MPV 11.1 8.9 - 12.9 FL    NRBC 0.0 0  WBC    ABSOLUTE NRBC 0.00 0.00 - 5.50 K/uL   METABOLIC PANEL, COMPREHENSIVE   Result Value Ref Range    Sodium 126 (L) 136 - 145 mmol/L    Potassium HEMOLYZED,RECOLLECT REQUESTED 3.5 - 5.1 mmol/L    Chloride 99 97 - 108 mmol/L    CO2 21 21 - 32 mmol/L    Anion gap 6 5 - 15 mmol/L    Glucose 176 (H) 65 - 100 mg/dL    BUN 53 (H) 6 - 20 MG/DL    Creatinine 1.92 (H) 0.70 - 1.30 MG/DL    BUN/Creatinine ratio 28 (H) 12 - 20      GFR est AA 45 (L) >60 ml/min/1.73m2    GFR est non-AA 37 (L) >60 ml/min/1.73m2    Calcium 7.8 (L) 8.5 - 10.1 MG/DL    Bilirubin, total 0.8 0.2 - 1.0 MG/DL    ALT (SGPT) 66 12 - 78 U/L    AST (SGOT) 94 (H) 15 - 37 U/L    Alk.  phosphatase 48 45 - 117 U/L    Protein, total 7.0 6.4 - 8.2 g/dL    Albumin 2.4 (L) 3.5 - 5.0 g/dL    Globulin 4.6 (H) 2.0 - 4.0 g/dL    A-G Ratio 0.5 (L) 1.1 - 2.2     MAGNESIUM   Result Value Ref Range    Magnesium 2.2 1.6 - 2.4 mg/dL   METABOLIC PANEL, BASIC   Result Value Ref Range    Sodium 133 (L) 136 - 145 mmol/L    Potassium 4.0 3.5 - 5.1 mmol/L    Chloride 100 97 - 108 mmol/L    CO2 26 21 - 32 mmol/L    Anion gap 7 5 - 15 mmol/L    Glucose 163 (H) 65 - 100 mg/dL    BUN 51 (H) 6 - 20 MG/DL    Creatinine 1.80 (H) 0.70 - 1.30 MG/DL    BUN/Creatinine ratio 28 (H) 12 - 20      GFR est AA 48 (L) >60 ml/min/1.73m2    GFR est non-AA 40 (L) >60 ml/min/1.73m2    Calcium 8.0 (L) 8.5 - 10.1 MG/DL   RENAL FUNCTION PANEL   Result Value Ref Range    Sodium 133 (L) 136 - 145 mmol/L    Potassium 4.2 3.5 - 5.1 mmol/L    Chloride 100 97 - 108 mmol/L    CO2 27 21 - 32 mmol/L    Anion gap 6 5 - 15 mmol/L    Glucose 119 (H) 65 - 100 mg/dL    BUN 54 (H) 6 - 20 MG/DL    Creatinine 1.88 (H) 0.70 - 1.30 MG/DL    BUN/Creatinine ratio 29 (H) 12 - 20      GFR est AA 46 (L) >60 ml/min/1.73m2    GFR est non-AA 38 (L) >60 ml/min/1.73m2    Calcium 8.1 (L) 8.5 - 10.1 MG/DL    Phosphorus 3.6 2.6 - 4.7 MG/DL    Albumin 2.7 (L) 3.5 - 5.0 g/dL   MAGNESIUM   Result Value Ref Range    Magnesium 2.1 1.6 - 2.4 mg/dL   CBC WITH AUTOMATED DIFF   Result Value Ref Range    WBC 9.9 4.1 - 11.1 K/uL    RBC 4.15 4.10 - 5.70 M/uL    HGB 11.8 (L) 12.1 - 17.0 g/dL    HCT 37.0 36.6 - 50.3 %    MCV 89.2 80.0 - 99.0 FL    MCH 28.4 26.0 - 34.0 PG    MCHC 31.9 30.0 - 36.5 g/dL    RDW 12.7 11.5 - 14.5 %    PLATELET 227 033 - 753 K/uL    MPV 10.7 8.9 - 12.9 FL    NRBC 0.0 0  WBC    ABSOLUTE NRBC 0.00 0.00 - 0.01 K/uL    NEUTROPHILS 81 (H) 32 - 75 %    BAND NEUTROPHILS 3 0 - 6 %    LYMPHOCYTES 9 (L) 12 - 49 %    MONOCYTES 7 5 - 13 %    EOSINOPHILS 0 0 - 7 %    BASOPHILS 0 0 - 1 %    IMMATURE GRANULOCYTES 0 %    ABS. NEUTROPHILS 8.3 (H) 1.8 - 8.0 K/UL    ABS. LYMPHOCYTES 0.9 0.8 - 3.5 K/UL    ABS. MONOCYTES 0.7 0.0 - 1.0 K/UL    ABS. EOSINOPHILS 0.0 0.0 - 0.4 K/UL    ABS. BASOPHILS 0.0 0.0 - 0.1 K/UL    ABS. IMM. GRANS. 0.0 K/UL    DF MANUAL      PLATELET COMMENTS Large Platelets      RBC COMMENTS NORMOCYTIC, NORMOCHROMIC      WBC COMMENTS HYPERSEGMENTED POLYS         Pieter Miller MD, FACP, [facility].   [address], [place], [address] [facility] [address]

## 2022-01-13 NOTE — PROGRESS NOTES
Chief Complaint   Patient presents with   [facility] Follow Up     3 most recent [facility] Screens 1/13/2022   Little interest or pleasure in doing things Not at all   Feeling down, depressed, irritable, or hopeless Not at all   Total Score PHQ 2 0     1. Have you been to the ER, urgent care clinic since your last visit? Hospitalized since your last visit? yes    2. Have you seen or consulted any other health care providers outside of the [address] [place] [address] since your last visit? Include any pap smears or colon screening.  no

## 2022-01-14 ENCOUNTER — PATIENT OUTREACH (OUTPATIENT)
Dept: CASE MANAGEMENT | Age: 52
End: 2022-01-14

## 2022-01-19 ENCOUNTER — OFFICE VISIT (OUTPATIENT)
Dept: INTERNAL MEDICINE CLINIC | Age: 52
End: 2022-01-19
Payer: MEDICAID

## 2022-01-19 VITALS
SYSTOLIC BLOOD PRESSURE: 154 MMHG | TEMPERATURE: 98 F | DIASTOLIC BLOOD PRESSURE: 98 MMHG | RESPIRATION RATE: 16 BRPM | OXYGEN SATURATION: 95 % | HEART RATE: 85 BPM | BODY MASS INDEX: 34.72 KG/M2 | HEIGHT: 70 IN

## 2022-01-19 DIAGNOSIS — U07.1 COVID-19: Primary | ICD-10-CM

## 2022-01-19 DIAGNOSIS — R09.89 SUSPECTED PULMONARY EMBOLISM: ICD-10-CM

## 2022-01-19 PROCEDURE — 99213 OFFICE O/P EST LOW 20 MIN: CPT | Performed by: INTERNAL MEDICINE

## 2022-01-19 NOTE — PATIENT INSTRUCTIONS
DASH Diet: Care Instructions  Your Care Instructions     The DASH diet is an eating plan that can help lower your blood pressure. DASH stands for Dietary Approaches to Stop Hypertension. Hypertension is high blood pressure. The DASH diet focuses on eating foods that are high in calcium, potassium, and magnesium. These nutrients can lower blood pressure. The foods that are highest in these nutrients are fruits, vegetables, low-fat dairy products, nuts, seeds, and legumes. But taking calcium, potassium, and magnesium supplements instead of eating foods that are high in those nutrients does not have the same effect. The DASH diet also includes whole grains, fish, and poultry. The DASH diet is one of several lifestyle changes your doctor may recommend to lower your high blood pressure. Your doctor may also want you to decrease the amount of sodium in your diet. Lowering sodium while following the DASH diet can lower blood pressure even further than just the DASH diet alone. Follow-up care is a key part of your treatment and safety. Be sure to make and go to all appointments, and call your doctor if you are having problems. It's also a good idea to know your test results and keep a list of the medicines you take. How can you care for yourself at home? Following the DASH diet  · Eat 4 to 5 servings of fruit each day. A serving is 1 medium-sized piece of fruit, ½ cup chopped or canned fruit, 1/4 cup dried fruit, or 4 ounces (½ cup) of fruit juice. Choose fruit more often than fruit juice. · Eat 4 to 5 servings of vegetables each day. A serving is 1 cup of lettuce or raw leafy vegetables, ½ cup of chopped or cooked vegetables, or 4 ounces (½ cup) of vegetable juice. Choose vegetables more often than vegetable juice. · Get 2 to 3 servings of low-fat and fat-free dairy each day. A serving is 8 ounces of milk, 1 cup of yogurt, or 1 ½ ounces of cheese. · Eat 6 to 8 servings of grains each day.  A serving is 1 slice of bread, 1 ounce of dry cereal, or ½ cup of cooked rice, pasta, or cooked cereal. Try to choose whole-grain products as much as possible. · Limit lean meat, poultry, and fish to 2 servings each day. A serving is 3 ounces, about the size of a deck of cards. · Eat 4 to 5 servings of nuts, seeds, and legumes (cooked dried beans, lentils, and split peas) each week. A serving is 1/3 cup of nuts, 2 tablespoons of seeds, or ½ cup of cooked beans or peas. · Limit fats and oils to 2 to 3 servings each day. A serving is 1 teaspoon of vegetable oil or 2 tablespoons of salad dressing. · Limit sweets and added sugars to 5 servings or less a week. A serving is 1 tablespoon jelly or jam, ½ cup sorbet, or 1 cup of lemonade. · Eat less than 2,300 milligrams (mg) of sodium a day. If you limit your sodium to 1,500 mg a day, you can lower your blood pressure even more. · Be aware that all of these are the suggested number of servings for people who eat 1,800 to 2,000 calories a day. Your recommended number of servings may be different if you need more or fewer calories. Tips for success  · Start small. Do not try to make dramatic changes to your diet all at once. You might feel that you are missing out on your favorite foods and then be more likely to not follow the plan. Make small changes, and stick with them. Once those changes become habit, add a few more changes. · Try some of the following:  ? Make it a goal to eat a fruit or vegetable at every meal and at snacks. This will make it easy to get the recommended amount of fruits and vegetables each day. ? Try yogurt topped with fruit and nuts for a snack or healthy dessert. ? Add lettuce, tomato, cucumber, and onion to sandwiches. ? Combine a ready-made pizza crust with low-fat mozzarella cheese and lots of vegetable toppings. Try using tomatoes, squash, spinach, broccoli, carrots, cauliflower, and onions. ?  Have a variety of cut-up vegetables with a low-fat dip as an appetizer instead of chips and dip. ? Sprinkle sunflower seeds or chopped almonds over salads. Or try adding chopped walnuts or almonds to cooked vegetables. ? Try some vegetarian meals using beans and peas. Add garbanzo or kidney beans to salads. Make burritos and tacos with mashed li beans or black beans. Where can you learn more? Go to http://www.winter.com/  Enter H967 in the search box to learn more about \"DASH Diet: Care Instructions. \"  Current as of: April 29, 2021               Content Version: 13.0  © 8746-2379 [employer]. Care instructions adapted under license by [employer] (which disclaims liability or warranty for this information). If you have questions about a medical condition or this instruction, always ask your healthcare professional. [address] any warranty or liability for your use of this information.

## 2022-01-19 NOTE — PROGRESS NOTES
Chief Complaint   Patient presents with    Form Completion     .                       Chief Complaint   Patient presents with    Form Completion     1. Have you been to the ER, urgent care clinic since your last visit? Hospitalized since your last visit? No    2. Have you seen or consulted any other health care providers outside of the [address] [place] [address] since your last visit? Include any pap smears or colon screening.  No                                                      0

## 2022-02-02 NOTE — PROGRESS NOTES
Physician Progress Note      [facility] #:                  [id number]  :                       1970  ADMIT DATE:       2022 8:21 PM  [address] [place] DATE:        2022 6:55 PM  RESPONDING  PROVIDER #:        Elias Sam MD          QUERY TEXT:    Dear attending,    Patient admitted to [facility] for acute hypoxic respiratory failure secondary to COVID-19 pneumonia. Noted documentation of sepsis secondary to Covid 19 per the progress notes of 22. However, during this stay the patient was afebrile, WBC <12.0 and did not receive IV antibiotics. The patient did have elevated neutrophils and elevated CRP. In order to support the diagnosis of sepsis, please include additional clinical indicators in your documentation. Or please document if the diagnosis of sepsis has been ruled out after further study. The medical record reflects the following:  Risk Factors: + COVID + on  with pneumonia  Clinical Indicators: ?-WBC 5.0, CRP >9.5 CXR- IMPRESSION-Slight worsening diffuse bilateral airspace disease. - neutrophils elevated at 87, CRP 3.47  -Per PN-Acute hypoxic respiratory failure secondary to COVID-19 pneumonia  Sepsis secondary to COVID 19  - WBC 11.4, neutrophils 88  Treatment: Monitor vital signs, labwork, imaging, pulse oximetry, Solu-medrol 80 mg IV q12 hours  Options provided:  -- Sepsis present as evidenced by, Please document evidence. -- No Sepsis  -- Other - I will add my own diagnosis  -- Disagree - Not applicable / Not valid  -- Disagree - Clinically unable to determine / Unknown  -- Refer to Clinical Documentation Reviewer    PROVIDER RESPONSE TEXT:    This patient does not have sepsis.     Query created by: Red Snow on 2022 10:06 AM      Electronically signed by:  Elias Sam MD 2022 8:28 AM

## 2022-02-17 ENCOUNTER — OFFICE VISIT (OUTPATIENT)
Dept: INTERNAL MEDICINE CLINIC | Age: 52
End: 2022-02-17
Payer: MEDICAID

## 2022-02-17 VITALS
HEART RATE: 75 BPM | WEIGHT: 250.5 LBS | BODY MASS INDEX: 35.86 KG/M2 | DIASTOLIC BLOOD PRESSURE: 78 MMHG | OXYGEN SATURATION: 96 % | RESPIRATION RATE: 17 BRPM | HEIGHT: 70 IN | TEMPERATURE: 98.3 F | SYSTOLIC BLOOD PRESSURE: 126 MMHG

## 2022-02-17 DIAGNOSIS — R29.90 POST-COVID CHRONIC NEUROLOGIC SYMPTOMS: ICD-10-CM

## 2022-02-17 DIAGNOSIS — Z00.00 HEALTHCARE MAINTENANCE: ICD-10-CM

## 2022-02-17 DIAGNOSIS — U09.9 POST-COVID CHRONIC NEUROLOGIC SYMPTOMS: ICD-10-CM

## 2022-02-17 DIAGNOSIS — G89.29 CHRONIC MIDLINE LOW BACK PAIN WITHOUT SCIATICA: ICD-10-CM

## 2022-02-17 DIAGNOSIS — F33.1 MODERATE EPISODE OF RECURRENT MAJOR DEPRESSIVE DISORDER (HCC): ICD-10-CM

## 2022-02-17 DIAGNOSIS — M54.50 CHRONIC MIDLINE LOW BACK PAIN WITHOUT SCIATICA: ICD-10-CM

## 2022-02-17 DIAGNOSIS — I10 ESSENTIAL HYPERTENSION: Primary | ICD-10-CM

## 2022-02-17 DIAGNOSIS — Z12.5 ENCOUNTER FOR PROSTATE CANCER SCREENING: ICD-10-CM

## 2022-02-17 DIAGNOSIS — R09.89 SUSPECTED PULMONARY EMBOLISM: ICD-10-CM

## 2022-02-17 DIAGNOSIS — Z23 ENCOUNTER FOR IMMUNIZATION: ICD-10-CM

## 2022-02-17 PROCEDURE — 90715 TDAP VACCINE 7 YRS/> IM: CPT | Performed by: INTERNAL MEDICINE

## 2022-02-17 PROCEDURE — 99214 OFFICE O/P EST MOD 30 MIN: CPT | Performed by: INTERNAL MEDICINE

## 2022-02-17 RX ORDER — ESCITALOPRAM OXALATE 10 MG/1
10 TABLET ORAL DAILY
Qty: 30 TABLET | Refills: 2 | Status: SHIPPED | OUTPATIENT
Start: 2022-02-17

## 2022-02-17 RX ORDER — ZOSTER VACCINE RECOMBINANT, ADJUVANTED 50 MCG/0.5
0.5 KIT INTRAMUSCULAR ONCE
Qty: 0.5 ML | Refills: 0 | Status: SHIPPED | OUTPATIENT
Start: 2022-02-17 | End: 2022-02-17

## 2022-02-17 RX ORDER — HYDROCHLOROTHIAZIDE 12.5 MG/1
12.5 TABLET ORAL DAILY
Qty: 90 TABLET | Refills: 2 | Status: SHIPPED | OUTPATIENT
Start: 2022-02-17

## 2022-02-17 RX ORDER — AMLODIPINE BESYLATE 10 MG/1
10 TABLET ORAL DAILY
Qty: 90 TABLET | Refills: 2 | Status: SHIPPED | OUTPATIENT
Start: 2022-02-17

## 2022-02-17 NOTE — PATIENT INSTRUCTIONS
Learning About Diuretics for High Blood Pressure  Overview  Diuretics help to lower blood pressure. This reduces your risk of a heart attack and stroke. It also reduces your risk of kidney disease. Diuretics cause your kidneys to remove sodium and water. They also relax the blood vessel walls. These help lower your blood pressure. Examples  · Chlorthalidone  · Hydrochlorothiazide  Possible side effects  There are some common side effects. They are:  · Too little potassium. · Feeling dizzy. · Rash. · Urinating a lot. · High blood sugar. (But this is not common.)  You may have other side effects. Check the information that comes with your medicine. What to know about taking this medicine  · You may take other medicines for blood pressure. Diuretics can help those work better. They can also prevent extra fluid in your body. · You may need to take potassium pills. Ask your doctor about this. · You may need blood tests to check on your health. For example, you may have tests to check your kidneys and your potassium level. · Take your medicines exactly as prescribed. Call your doctor if you think you are having a problem with your medicine. · Check with your doctor or pharmacist before you use any other medicines. This includes over-the-counter medicines. Make sure your doctor knows all of the medicines, vitamins, herbal products, and supplements you take. Taking some medicines together can cause problems. Where can you learn more? Go to http://www.gray.com/  Enter P617 in the search box to learn more about \"Learning About Diuretics for High Blood Pressure. \"  Current as of: April 29, 2021               Content Version: 13.0  © 2006-2021 [employer]. Care instructions adapted under license by [employer] (which disclaims liability or warranty for this information).  If you have questions about a medical condition or this instruction, always ask your healthcare professional. [address] any warranty or liability for your use of this information.

## 2022-02-17 NOTE — PROGRESS NOTES
Chief Complaint   Patient presents with    Results    Medication Refill     1. Have you been to the ER, urgent care clinic since your last visit? Hospitalized since your last visit? No    2. Have you seen or consulted any other health care providers outside of the [address] [place] [address] since your last visit? Include any pap smears or colon screening. Natalia     Jose Juan Blackmon is a 45 y.o. male  who presents for routine immunizations. He denies any symptoms , reactions or allergies that would exclude them from being immunized today. Risks and adverse reactions were discussed and the VIS was given to them. All questions were addressed. He was observed for 5 min post injection. There were no reactions observed.     [facility]

## 2022-02-17 NOTE — PROGRESS NOTES
Office Visit Note:    Assessment/Plan:  1. Essential hypertension    2. Post-COVID chronic neurologic symptoms    3. Chronic midline low back pain without sciatica    4. Encounter for immunization    5. Suspected pulmonary embolism    6. Encounter for prostate cancer screening    7. Healthcare maintenance    8. Moderate episode of recurrent major depressive disorder ([facility] [address]      1. Hypertension-blood pressure well controlled, he is on amlodipine and carvedilol for now but he is complaining of feeling tired and having no energy. I suspect this might be secondary to the carvedilol. Will change carvedilol to hydrochlorothiazide. We will asked to bring a blood pressure log for next visit  2. Post COVID-19 symptoms-I suspect at least some of his neurological symptoms could be secondary to his COVID-19. He also still continues to complain of cough. Continue with symptomatic management  3. Chronic low back pain-we will refer him to physical therapy, if no improvement they would like a referral to a back specialist   4. Suspected PE-had an indeterminate VQ scan, will plan to anticoagulate for a total of 3 months, will advised to continue on apixaban for 2 more months  5. Depression-his PHQ-9 score was at 14. He denies any frequent thoughts of suicide. He has good social support, he is future oriented. We will start him on Lexapro 10 mg daily. Health Maintenance:  Immunizations:  Tetanus: given today  Shingles: Shingles Vaccine ordered  Covid: Advised to get Covid shots after 1 month    Screening:  Colon cancer screening: Will refer to GI for colonoscopy  Prostate cancer: screening risks and benefits discussed, will check PSA.   Hepatitis C: Ordered today  HIV: Ordered today  Diabetes: A1c ordered today  Exercise: not much  Diet: good  Sleep: fair    Orders Placed This Encounter    TETANUS, DIPHTHERIA TOXOIDS AND ACELLULAR PERTUSSIS VACCINE (TDAP), IN INDIVIDS. >=7, IM    PSA SCREENING (SCREENING)     Standing Status: Future     Standing Expiration Date:   2023    LIPID PANEL     Standing Status:   Future     Standing Expiration Date:   2023    HIV 1/2 AG/AB, 4TH GENERATION,W RFLX CONFIRM     Standing Status:   Future     Standing Expiration Date:   2023    HCV AB W/RFLX TO SOUTH     Standing Status:   Future     Standing Expiration Date:   2023    HEMOGLOBIN A1C WITH EAG     Standing Status:   Future     Standing Expiration Date:   2023    REFERRAL TO PHYSICAL THERAPY     Referral Priority:   Routine     Referral Type:   PT/OT/ST     Referral Reason:   Specialty Services Required     Number of Visits Requested:   1    REFERRAL TO GASTROENTEROLOGY     Referral Priority:   Routine     Referral Type:   Consultation     Referral Reason:   Specialty Services Required     Referred to Provider:   Art Weber MD     Requested Specialty:   Gastroenterology     Number of Visits Requested:   1    amLODIPine (NORVASC) 10 mg tablet     Sig: Take 1 Tablet by mouth daily. Dispense:  90 Tablet     Refill:  2    hydroCHLOROthiazide (HYDRODIURIL) 12.5 mg tablet     Sig: Take 1 Tablet by mouth daily. Dispense:  90 Tablet     Refill:  2    varicella-zoster recombinant, PF, (Shingrix, PF,) 50 mcg/0.5 mL susr injection     Si.5 mL by IntraMUSCular route once for 1 dose. Indications: shingles vaccination     Dispense:  0.5 mL     Refill:  0    escitalopram oxalate (LEXAPRO) 10 mg tablet     Sig: Take 1 Tablet by mouth daily.      Dispense:  30 Tablet     Refill:  2     Social Determinants of Health     Tobacco Use: Low Risk     Smoking Tobacco Use: Never Smoker    Smokeless Tobacco Use: Never Used   Alcohol Use:     Frequency of Alcohol Consumption: Not on file    Average Number of Drinks: Not on file    Frequency of Binge Drinking: Not on file   Financial Resource Strain:     Difficulty of Paying Living Expenses: Not on file   Food Insecurity:     Worried About Running Out of Food in the Last Year: Not on file    Ran Out of Food in the Last Year: Not on file   Transportation Needs:     Lack of Transportation (Medical): Not on file    Lack of Transportation (Non-Medical): Not on file   Physical Activity:     Days of Exercise per Week: Not on file    Minutes of Exercise per Session: Not on file   Stress:     Feeling of Stress : Not on file   Social Connections:     Frequency of Communication with Friends and Family: Not on file    Frequency of Social Gatherings with Friends and Family: Not on file    Attends [employer] Services: Not on file    Active Member of Clubs or Organizations: Not on file    Attends Club or Organization Meetings: Not on file    Marital Status: Not on file   Intimate Partner Violence:     Fear of Current or Ex-Partner: Not on file    Emotionally Abused: Not on file    Physically Abused: Not on file    Sexually Abused: Not on file   Depression: Not at risk    PHQ-2 Score: 0   Housing Stability:     Unable to Pay for Housing in the Last Year: Not on file    Number of [place] in the Last Year: Not on file    Unstable Housing in the Last Year: Not on file       Follow-up and Dispositions    · Return in about 2 months (around 4/17/2022). I have reviewed with the patient details of the assessment and plan and all questions were answered. Relevant patient education was performed. The most recent lab findings were reviewed with the patient. An After Visit Summary was printed and given to the patient. Reason for Visit: Results and Medication Refill    Subjective:  59-year-old who was recently admitted to the hospital with COVID-19 comes for follow-up. He has been having some neurological symptoms with feeling tired and being confused. He states her symptoms are slowly getting better. He is now off oxygen. He still continues to complain of some cough but denies any other complaints to me. He does have chronic back pain which has been bothering her for years. He also states he feels depressed. Denies any suicidal ideations    Review of Systems  A complete 11 system ROS was preformed (constitutional, eyes, ENT, cardiovascular, respiratory, gastrointestinal, genitourinary, musculoskeletal, skin, neurological, psychiatric) and was negative aside from the pertinent positives and negatives noted in the HPI. Past Medical History:   Diagnosis Date    Hypertension      History reviewed. No pertinent surgical history. Social History     Socioeconomic History    Marital status:    Tobacco Use    Smoking status: Never Smoker    Smokeless tobacco: Never Used   Vaping Use    Vaping Use: Never used   Substance and Sexual Activity    Alcohol use: Not Currently    Drug use: Never     History reviewed. No pertinent family history. Current Outpatient Medications   Medication Sig Dispense Refill    amLODIPine (NORVASC) 10 mg tablet Take 1 Tablet by mouth daily. 90 Tablet 2    hydroCHLOROthiazide (HYDRODIURIL) 12.5 mg tablet Take 1 Tablet by mouth daily. 90 Tablet 2    varicella-zoster recombinant, PF, (Shingrix, PF,) 50 mcg/0.5 mL susr injection 0.5 mL by IntraMUSCular route once for 1 dose. Indications: shingles vaccination 0.5 mL 0    escitalopram oxalate (LEXAPRO) 10 mg tablet Take 1 Tablet by mouth daily. 30 Tablet 2    apixaban (Eliquis) 5 mg tablet Take 1 Tablet by mouth two (2) times a day for 60 days. 60 Tablet 1     No Known Allergies    Objective:  Visit Vitals  /78 (BP 1 Location: Left upper arm, BP Patient Position: Sitting, BP Cuff Size: Adult)   Pulse 75   Temp 98.3 °F (36.8 °C) (Oral)   Resp 17   Ht 5' 10\" (1.778 m)   Wt 250 lb 8 oz (113.6 kg)   SpO2 96%   BMI 35.94 kg/m²     Physical Exam:   AA&O x3. Not pale, not in any distress. HEENT: ENT negative. Neck: Supple, no JVD or lymphadenopathy. Lungs: clear  Heart: S1 S2 +, RRR  Abdomen: Soft, No tenderness  Neuro: No focal deficits. Skin: No erythema or lesions noted.   Extremities: no pedal edema, good peripheral pulses  Psych: Normal affect and mood. Results for orders placed or performed during the hospital encounter of 01/04/22   URINE CULTURE HOLD SAMPLE    Specimen: Serum   Result Value Ref Range    Urine culture hold        Urine on hold in Microbiology dept for 2 days. If unpreserved urine is submitted, it cannot be used for addtional testing after 24 hours, recollection will be required. CBC WITH AUTOMATED DIFF   Result Value Ref Range    WBC 6.9 4.1 - 11.1 K/uL    RBC 4.39 4. 10 - 5.70 M/uL    HGB 12.5 12.1 - 17.0 g/dL    HCT 38.9 36.6 - 50.3 %    MCV 88.6 80.0 - 99.0 FL    MCH 28.5 26.0 - 34.0 PG    MCHC 32.1 30.0 - 36.5 g/dL    RDW 13.9 11.5 - 14.5 %    PLATELET 311 780 - 954 K/uL    MPV 11.5 8.9 - 12.9 FL    NRBC 0.0 0  WBC    ABSOLUTE NRBC 0.00 0.00 - 0.01 K/uL    NEUTROPHILS 87 (H) 32 - 75 %    LYMPHOCYTES 7 (L) 12 - 49 %    MONOCYTES 6 5 - 13 %    EOSINOPHILS 0 0 - 7 %    BASOPHILS 0 0 - 1 %    IMMATURE GRANULOCYTES 0 %    ABS. NEUTROPHILS 6.0 1.8 - 8.0 K/UL    ABS. LYMPHOCYTES 0.5 (L) 0.8 - 3.5 K/UL    ABS. MONOCYTES 0.4 0.0 - 1.0 K/UL    ABS. EOSINOPHILS 0.0 0.0 - 0.4 K/UL    ABS. BASOPHILS 0.0 0.0 - 0.1 K/UL    ABS. IMM.  GRANS. 0.0 K/UL    DF MANUAL      PLATELET COMMENTS Large Platelets      RBC COMMENTS NORMOCYTIC, NORMOCHROMIC     METABOLIC PANEL, COMPREHENSIVE   Result Value Ref Range    Sodium 123 (L) 136 - 145 mmol/L    Potassium HEMOLYZED,RECOLLECT REQUESTED 3.5 - 5.1 mmol/L    Chloride 101 97 - 108 mmol/L    CO2 22 21 - 32 mmol/L    Anion gap 0 (L) 5 - 15 mmol/L    Glucose 130 (H) 65 - 100 mg/dL    BUN 49 (H) 6 - 20 MG/DL    Creatinine 2.03 (H) 0.70 - 1.30 MG/DL    BUN/Creatinine ratio 24 (H) 12 - 20      GFR est AA 42 (L) >60 ml/min/1.73m2    GFR est non-AA 35 (L) >60 ml/min/1.73m2    Calcium 8.4 (L) 8.5 - 10.1 MG/DL    Bilirubin, total HEMOLYZED,RECOLLECT REQUESTED 0.2 - 1.0 MG/DL    ALT (SGPT) HEMOLYZED,RECOLLECT REQUESTED 12 - 78 U/L    AST (SGOT) HEMOLYZED,RECOLLECT REQUESTED 15 - 37 U/L    Alk.  phosphatase 47 45 - 117 U/L    Protein, total 8.4 (H) 6.4 - 8.2 g/dL    Albumin 2.0 (L) 3.5 - 5.0 g/dL    Globulin 6.4 (H) 2.0 - 4.0 g/dL    A-G Ratio 0.3 (L) 1.1 - 2.2     POC G3 - PUL   Result Value Ref Range    FIO2 (POC) 91 %    pH (POC) 7.43 7.35 - 7.45      pCO2 (POC) 38.0 35.0 - 45.0 MMHG    pO2 (POC) 71 (L) 80 - 100 MMHG    HCO3 (POC) 25.4 22 - 26 MMOL/L    sO2 (POC) 94.6 92 - 97 %    Base excess (POC) 1.3 mmol/L    Site LEFT RADIAL      Device: High Flow Nasal Cannula      Allens test (POC) Positive      Specimen type (POC) ARTERIAL     METABOLIC PANEL, BASIC   Result Value Ref Range    Sodium 137 136 - 145 mmol/L    Potassium 4.0 3.5 - 5.1 mmol/L    Chloride 105 97 - 108 mmol/L    CO2 24 21 - 32 mmol/L    Anion gap 8 5 - 15 mmol/L    Glucose 137 (H) 65 - 100 mg/dL    BUN 52 (H) 6 - 20 MG/DL    Creatinine 2.06 (H) 0.70 - 1.30 MG/DL    BUN/Creatinine ratio 25 (H) 12 - 20      GFR est AA 41 (L) >60 ml/min/1.73m2    GFR est non-AA 34 (L) >60 ml/min/1.73m2    Calcium 8.3 (L) 8.5 - 10.1 MG/DL   SODIUM, UR, RANDOM   Result Value Ref Range    Sodium,urine random 13 MMOL/L   CREATININE, UR, RANDOM   Result Value Ref Range    Creatinine, urine 184.00 mg/dL   CHLORIDE, URINE RANDOM   Result Value Ref Range    Chloride,urine random <10 MMOL/L   C REACTIVE PROTEIN, QT   Result Value Ref Range    C-Reactive protein 3.47 (H) 0.00 - 4.19 mg/dL   METABOLIC PANEL, BASIC   Result Value Ref Range    Sodium 136 136 - 145 mmol/L    Potassium 4.1 3.5 - 5.1 mmol/L    Chloride 103 97 - 108 mmol/L    CO2 24 21 - 32 mmol/L    Anion gap 9 5 - 15 mmol/L    Glucose 119 (H) 65 - 100 mg/dL    BUN 55 (H) 6 - 20 MG/DL    Creatinine 2.17 (H) 0.70 - 1.30 MG/DL    BUN/Creatinine ratio 25 (H) 12 - 20      GFR est AA 39 (L) >60 ml/min/1.73m2    GFR est non-AA 32 (L) >60 ml/min/1.73m2    Calcium 8.2 (L) 8.5 - 10.1 MG/DL   CBC WITH AUTOMATED DIFF   Result Value Ref Range    WBC 9.2 4.1 - 11.1 K/uL RBC 4.23 4. 10 - 5.70 M/uL    HGB 12.0 (L) 12.1 - 17.0 g/dL    HCT 38.2 36.6 - 50.3 %    MCV 90.3 80.0 - 99.0 FL    MCH 28.4 26.0 - 34.0 PG    MCHC 31.4 30.0 - 36.5 g/dL    RDW 13.1 11.5 - 14.5 %    PLATELET 833 667 - 506 K/uL    MPV 11.5 8.9 - 12.9 FL    NRBC 0.0 0  WBC    ABSOLUTE NRBC 0.00 0.00 - 0.01 K/uL    NEUTROPHILS 74 32 - 75 %    LYMPHOCYTES 19 12 - 49 %    MONOCYTES 7 5 - 13 %    EOSINOPHILS 0 0 - 7 %    BASOPHILS 0 0 - 1 %    IMMATURE GRANULOCYTES 0 %    ABS. NEUTROPHILS 6.9 1.8 - 8.0 K/UL    ABS. LYMPHOCYTES 1.7 0.8 - 3.5 K/UL    ABS. MONOCYTES 0.6 0.0 - 1.0 K/UL    ABS. EOSINOPHILS 0.0 0.0 - 0.4 K/UL    ABS. BASOPHILS 0.0 0.0 - 0.1 K/UL    ABS. IMM. GRANS. 0.0 K/UL    DF MANUAL      PLATELET COMMENTS Large Platelets      RBC COMMENTS NORMOCYTIC, NORMOCHROMIC      WBC COMMENTS ATYPICAL LYMPHOCYTES PRESENT     PHOSPHORUS   Result Value Ref Range    Phosphorus 3.8 2.6 - 4.7 MG/DL   CBC WITH AUTOMATED DIFF   Result Value Ref Range    WBC 11.4 (H) 4.1 - 11.1 K/uL    RBC 4.38 4.10 - 5.70 M/uL    HGB 12.6 12.1 - 17.0 g/dL    HCT 40.2 36.6 - 50.3 %    MCV 91.8 80.0 - 99.0 FL    MCH 28.8 26.0 - 34.0 PG    MCHC 31.3 30.0 - 36.5 g/dL    RDW 12.8 11.5 - 14.5 %    PLATELET 849 548 - 073 K/uL    MPV 10.6 8.9 - 12.9 FL    NRBC 0.0 0  WBC    ABSOLUTE NRBC 0.00 0.00 - 0.01 K/uL    NEUTROPHILS 88 (H) 32 - 75 %    BAND NEUTROPHILS 1 0 - 6 %    LYMPHOCYTES 6 (L) 12 - 49 %    MONOCYTES 5 5 - 13 %    EOSINOPHILS 0 0 - 7 %    BASOPHILS 0 0 - 1 %    IMMATURE GRANULOCYTES 0 %    ABS. NEUTROPHILS 10.1 (H) 1.8 - 8.0 K/UL    ABS. LYMPHOCYTES 0.7 (L) 0.8 - 3.5 K/UL    ABS. MONOCYTES 0.6 0.0 - 1.0 K/UL    ABS. EOSINOPHILS 0.0 0.0 - 0.4 K/UL    ABS. BASOPHILS 0.0 0.0 - 0.1 K/UL    ABS. IMM.  GRANS. 0.0 K/UL    DF MANUAL      PLATELET COMMENTS Large Platelets      RBC COMMENTS NORMOCYTIC, NORMOCHROMIC      WBC COMMENTS HYPERSEGMENTED POLYS     CBC W/O DIFF   Result Value Ref Range    WBC 11.3 (H) 4.1 - 11.1 K/uL    RBC 4. 10 4. 10 - 5.70 M/uL    HGB 11.9 (L) 12.1 - 17.0 g/dL    HCT 36.1 (L) 36.6 - 50.3 %    MCV 88.0 80.0 - 99.0 FL    MCH 29.0 26.0 - 34.0 PG    MCHC 33.0 30.0 - 36.5 g/dL    RDW 12.8 11.5 - 14.5 %    PLATELET 108 157 - 992 K/uL    MPV 11.1 8.9 - 12.9 FL    NRBC 0.0 0  WBC    ABSOLUTE NRBC 0.00 0.00 - 7.85 K/uL   METABOLIC PANEL, COMPREHENSIVE   Result Value Ref Range    Sodium 126 (L) 136 - 145 mmol/L    Potassium HEMOLYZED,RECOLLECT REQUESTED 3.5 - 5.1 mmol/L    Chloride 99 97 - 108 mmol/L    CO2 21 21 - 32 mmol/L    Anion gap 6 5 - 15 mmol/L    Glucose 176 (H) 65 - 100 mg/dL    BUN 53 (H) 6 - 20 MG/DL    Creatinine 1.92 (H) 0.70 - 1.30 MG/DL    BUN/Creatinine ratio 28 (H) 12 - 20      GFR est AA 45 (L) >60 ml/min/1.73m2    GFR est non-AA 37 (L) >60 ml/min/1.73m2    Calcium 7.8 (L) 8.5 - 10.1 MG/DL    Bilirubin, total 0.8 0.2 - 1.0 MG/DL    ALT (SGPT) 66 12 - 78 U/L    AST (SGOT) 94 (H) 15 - 37 U/L    Alk.  phosphatase 48 45 - 117 U/L    Protein, total 7.0 6.4 - 8.2 g/dL    Albumin 2.4 (L) 3.5 - 5.0 g/dL    Globulin 4.6 (H) 2.0 - 4.0 g/dL    A-G Ratio 0.5 (L) 1.1 - 2.2     MAGNESIUM   Result Value Ref Range    Magnesium 2.2 1.6 - 2.4 mg/dL   METABOLIC PANEL, BASIC   Result Value Ref Range    Sodium 133 (L) 136 - 145 mmol/L    Potassium 4.0 3.5 - 5.1 mmol/L    Chloride 100 97 - 108 mmol/L    CO2 26 21 - 32 mmol/L    Anion gap 7 5 - 15 mmol/L    Glucose 163 (H) 65 - 100 mg/dL    BUN 51 (H) 6 - 20 MG/DL    Creatinine 1.80 (H) 0.70 - 1.30 MG/DL    BUN/Creatinine ratio 28 (H) 12 - 20      GFR est AA 48 (L) >60 ml/min/1.73m2    GFR est non-AA 40 (L) >60 ml/min/1.73m2    Calcium 8.0 (L) 8.5 - 10.1 MG/DL   RENAL FUNCTION PANEL   Result Value Ref Range    Sodium 133 (L) 136 - 145 mmol/L    Potassium 4.2 3.5 - 5.1 mmol/L    Chloride 100 97 - 108 mmol/L    CO2 27 21 - 32 mmol/L    Anion gap 6 5 - 15 mmol/L    Glucose 119 (H) 65 - 100 mg/dL    BUN 54 (H) 6 - 20 MG/DL    Creatinine 1.88 (H) 0.70 - 1.30 MG/DL BUN/Creatinine ratio 29 (H) 12 - 20      GFR est AA 46 (L) >60 ml/min/1.73m2    GFR est non-AA 38 (L) >60 ml/min/1.73m2    Calcium 8.1 (L) 8.5 - 10.1 MG/DL    Phosphorus 3.6 2.6 - 4.7 MG/DL    Albumin 2.7 (L) 3.5 - 5.0 g/dL   MAGNESIUM   Result Value Ref Range    Magnesium 2.1 1.6 - 2.4 mg/dL   CBC WITH AUTOMATED DIFF   Result Value Ref Range    WBC 9.9 4.1 - 11.1 K/uL    RBC 4.15 4.10 - 5.70 M/uL    HGB 11.8 (L) 12.1 - 17.0 g/dL    HCT 37.0 36.6 - 50.3 %    MCV 89.2 80.0 - 99.0 FL    MCH 28.4 26.0 - 34.0 PG    MCHC 31.9 30.0 - 36.5 g/dL    RDW 12.7 11.5 - 14.5 %    PLATELET 602 360 - 944 K/uL    MPV 10.7 8.9 - 12.9 FL    NRBC 0.0 0  WBC    ABSOLUTE NRBC 0.00 0.00 - 0.01 K/uL    NEUTROPHILS 81 (H) 32 - 75 %    BAND NEUTROPHILS 3 0 - 6 %    LYMPHOCYTES 9 (L) 12 - 49 %    MONOCYTES 7 5 - 13 %    EOSINOPHILS 0 0 - 7 %    BASOPHILS 0 0 - 1 %    IMMATURE GRANULOCYTES 0 %    ABS. NEUTROPHILS 8.3 (H) 1.8 - 8.0 K/UL    ABS. LYMPHOCYTES 0.9 0.8 - 3.5 K/UL    ABS. MONOCYTES 0.7 0.0 - 1.0 K/UL    ABS. EOSINOPHILS 0.0 0.0 - 0.4 K/UL    ABS. BASOPHILS 0.0 0.0 - 0.1 K/UL    ABS. IMM. GRANS. 0.0 K/UL    DF MANUAL      PLATELET COMMENTS Large Platelets      RBC COMMENTS NORMOCYTIC, NORMOCHROMIC      WBC COMMENTS HYPERSEGMENTED POLYS         Kelli Hdez MD, FACP, [facility].   [address], [place], [place].

## 2022-02-18 LAB
CHOLEST SERPL-MCNC: 224 MG/DL
EST. AVERAGE GLUCOSE BLD GHB EST-MCNC: 100 MG/DL
HBA1C MFR BLD: 5.1 % (ref 4–5.6)
HDLC SERPL-MCNC: 44 MG/DL
HDLC SERPL: 5.1 {RATIO} (ref 0–5)
HIV 1+2 AB+HIV1 P24 AG SERPL QL IA: NONREACTIVE
HIV12 RESULT COMMENT, HHIVC: NORMAL
LDLC SERPL CALC-MCNC: 151 MG/DL (ref 0–100)
PSA SERPL-MCNC: 1 NG/ML (ref 0.01–4)
TRIGL SERPL-MCNC: 145 MG/DL (ref ?–150)
VLDLC SERPL CALC-MCNC: 29 MG/DL

## 2022-02-19 LAB
HCV AB S/CO SERPL IA: <0.1 S/CO RATIO (ref 0–0.9)
HCV AB SERPL QL IA: NORMAL

## 2022-03-16 ENCOUNTER — HOSPITAL ENCOUNTER (OUTPATIENT)
Dept: PHYSICAL THERAPY | Age: 52
End: 2022-03-16

## 2022-03-28 ENCOUNTER — APPOINTMENT (OUTPATIENT)
Dept: PHYSICAL THERAPY | Age: 52
End: 2022-03-28

## 2022-04-12 ENCOUNTER — HOSPITAL ENCOUNTER (OUTPATIENT)
Dept: PHYSICAL THERAPY | Age: 52
Discharge: HOME | End: 2022-04-12
Payer: MEDICAID

## 2022-04-12 PROCEDURE — 97161 PT EVAL LOW COMPLEX 20 MIN: CPT | Performed by: PHYSICAL THERAPIST

## 2022-04-12 NOTE — PROGRESS NOTES
Physical Therapy at [facility],   a part of [facility] [place]  [address] [address], [address] [place] [address], [address] [place] [address]  Phone: [phone]  Fax: [phone]     Plan of Care/Statement of Necessity for Physical Therapy Services  2-15     Patient name: Nidia Smalls                : 1970                        Provider#: [id number]  Referral source: Mono Nunez MD                                                  Medical/Treatment Diagnosis: Other low back pain [M54.59]                                  Prior Hospitalization: see medical history                                      Comorbidities: post-COVID syndrome, HTN, depression  Prior Level of Function: limited function over last few years  Medications: Verified on Patient Summary List     Start of Care: 21                                                                     Onset Date: chronic, 2019                               The Plan of Care and following information is based on the information from the initial evaluation. Assessment/ key information: Patient reports with chronic LBP and bilateral LE pain. No obvious neural tension noted with SLR today, and given located of reported burning pain, it could possibly be meralgia parasthetica. Did note significantly tight hip flexors and hips in general today, which is likely predisposing him to anterior pelvic tilt and increasing his back pain and limited extension.      O2 levels were monitored in today's session as he is still mildly short of breath secondary to COVID infection back in December with 2 week hospitalization.      Evaluation Complexity History HIGH Complexity :3+ comorbidities / personal factors will impact the outcome/ POC ; Examination HIGH Complexity : 4+ Standardized tests and measures addressing body structure, function, activity limitation and / or participation in recreation  ;Presentation LOW Complexity : Stable, uncomplicated  ;Clinical Decision Making MEDIUM Complexity : FOTO score of 26-74  Overall Complexity Rating: LOW      Problem List: pain affecting function, decrease ROM, decrease strength, impaired gait/ balance, decrease ADL/ functional abilitiies, decrease activity tolerance and decrease flexibility/ joint mobility             Treatment Plan may include any combination of the following: Therapeutic exercise, Therapeutic activities, Neuromuscular re-education, Physical agent/modality, Manual therapy, Patient education, Self Care training and Functional mobility training  Patient / Family readiness to learn indicated by: asking questions and trying to perform skills  Persons(s) to be included in education: patient (P)  Barriers to Learning/Limitations: yes; Financial/transportation  Patient Goal (s): find best approach to resolving the issue  Patient Self Reported Health Status: fair  Rehabilitation Potential: fair     Short Term Goals: To be accomplished in 2-4 treatments:               1. Pt will be compliant with PT attendance and HEP use  Long Term Goals: To be accomplished in 10-12 treatments:               1. Pt will be able to walk around the block without significant back pain   2. Pt will be able to stand to cook a meal without significant back discomfort   3. Pt will be able to perform light yardwork without back pain   4. Pt will be able to self-manage care using updated HEP for improved independence   5. Improved FOTO score to 51 or better to demonstrate improved function  Frequency / Duration: Patient to be seen 1-2 times per week for 10-12 treatments.     Patient/ Caregiver education and instruction: self care and exercises     [x]? Plan of care has been reviewed with MIRIAN Metz PT, DPT 4/12/2022      ________________________________________________________________________     I certify that the above Therapy Services are being furnished while the patient is under my care.  I agree with the treatment plan and certify that this therapy is necessary.     Physician's Signature:____________________  Date:____________Time: _________                                          Ash Collins MD

## 2022-04-12 NOTE — PROGRESS NOTES
PT INITIAL EVALUATION NOTE 2-15     Patient Name: Rachel Duran  Date:2022  : 1970  [x]? Patient  Verified  Payor: [facility] MEDICAID / Plan: [address] [place] [address] / Product Type: Managed Care Medicaid /    In time: [id number]  Out time: 124p  Total Treatment Time (min): 50  Visit #: 1      Treatment Area: Other low back pain [M54.59]     SUBJECTIVE  Pain Level (0-10 scale): 10  Any medication changes, allergies to medications, adverse drug reactions, diagnosis change, or new procedure performed?: []? No    [x]? Yes (see summary sheet for update)  Subjective:     Patient reports with chronic low back pain that began back in 2019. He was working in a warehouse, and had an injury at the time and was diagnosed with a sprain. He went to [facility] and they referred him to an Ortho but wasn't able to get an MRI. He was hospitialized with COVID after December and was hospitalized for 2 weeks.  His back pain is centralized.      Description of symptoms: centralized low back pain  Aggravating Factors: standing, walking   Alleviating Factors: sitting     Radiation: burning pain in bilateral hips wrapping around to front of thighs  Reports numbness in legs when he is lying on his side     Patient reports functional limitations with: housework, cooking, standing, walking, yardwork     OBJECTIVE/EXAMINATION  Posture:  forward trunk lean in standing anterior pelvic tilt  Gait and Functional Mobility: forward standing position, limited hip extension, wide based gait  Palpation: TTP over L5-S1 region centrally                                                      Lumbar AROM:                                                                                               R                      L                        Flexion                                     WFLs                      Extension                                75% loss                                              Side Bending to just above knee bilaterally           Hip ROM  Flex to 85-90deg  Significantly reduced IR/ER bilaterally                                                         LOWER QUARTER                            MUSCLE STRENGTH  KEY                                                                             R                      L  0 - No Contraction                   L1, L2 Psoas               4                    4  1 - Trace                                  L3 Quads                    5                    5  2 - Poor                                   L4 Tib Ant                    5                    5  3 - Fair                                     L5 EHL                        --                    --  4 - Good                                  S1 Peroneals              --                    --  5 - Normal                               S2 Hams                     5                    5     Mobility Assessment: lumbar P-A mobility: hypomobile, painful with sidelying assessment L3-S1                                      Lumbar rotational mobility: stiff, stretch felt with light assessment                          MMT: R              HIP ER: 5/5              HIP Abd: 4+/5  Neurological: Sensations: intact to light touch  Special Tests: Forward Bend: (-)                                                         H.S. SLR: (-)                Vitals:  O2: 87% at first, but 94-7% with repeated rechecks  HR: 105-115bpm at checks                                                          5 min Therapeutic Exercise:  [x]? See flow sheet :   Rationale: increase ROM and increase strength to improve the patients ability to walk, stand without pain     With   []? TE   []? TA   []? Neuro   []? SC   []? other: Patient Education: [x]? Review HEP    []? Progressed/Changed HEP based on:   []? positioning   []? body mechanics   []? transfers   []? heat/ice application    []?  other:          Other Objective/Functional Measures: FOTO Functional Measure: 42/100     Pain Level (0-10 scale) post treatment: 10        ASSESSMENT:      [x]?   See Plan of Care        Tanmay Jason PT, DPT     4/12/2022

## 2022-04-13 ENCOUNTER — HOSPITAL ENCOUNTER (OUTPATIENT)
Dept: PHYSICAL THERAPY | Age: 52
End: 2022-04-13
Payer: MEDICAID

## 2022-04-21 ENCOUNTER — OFFICE VISIT (OUTPATIENT)
Dept: INTERNAL MEDICINE CLINIC | Age: 52
End: 2022-04-21
Payer: MEDICAID

## 2022-04-21 VITALS
OXYGEN SATURATION: 98 % | RESPIRATION RATE: 16 BRPM | HEIGHT: 70 IN | HEART RATE: 90 BPM | WEIGHT: 260 LBS | TEMPERATURE: 98 F | DIASTOLIC BLOOD PRESSURE: 84 MMHG | SYSTOLIC BLOOD PRESSURE: 132 MMHG | BODY MASS INDEX: 37.22 KG/M2

## 2022-04-21 DIAGNOSIS — M54.50 CHRONIC MIDLINE LOW BACK PAIN WITHOUT SCIATICA: ICD-10-CM

## 2022-04-21 DIAGNOSIS — F33.1 MODERATE EPISODE OF RECURRENT MAJOR DEPRESSIVE DISORDER (HCC): ICD-10-CM

## 2022-04-21 DIAGNOSIS — G89.29 CHRONIC MIDLINE LOW BACK PAIN WITHOUT SCIATICA: ICD-10-CM

## 2022-04-21 DIAGNOSIS — Z00.00 HEALTHCARE MAINTENANCE: ICD-10-CM

## 2022-04-21 DIAGNOSIS — U09.9 POST-COVID CHRONIC NEUROLOGIC SYMPTOMS: ICD-10-CM

## 2022-04-21 DIAGNOSIS — I10 ESSENTIAL HYPERTENSION: ICD-10-CM

## 2022-04-21 DIAGNOSIS — R29.90 POST-COVID CHRONIC NEUROLOGIC SYMPTOMS: ICD-10-CM

## 2022-04-21 DIAGNOSIS — R09.89 SUSPECTED PULMONARY EMBOLISM: ICD-10-CM

## 2022-04-21 DIAGNOSIS — I50.1 LEFT VENTRICULAR FAILURE (HCC): Primary | ICD-10-CM

## 2022-04-21 PROCEDURE — 99214 OFFICE O/P EST MOD 30 MIN: CPT | Performed by: INTERNAL MEDICINE

## 2022-04-21 RX ORDER — FUROSEMIDE 40 MG/1
40 TABLET ORAL DAILY
COMMUNITY
Start: 2022-04-18 | End: 2022-04-21 | Stop reason: SDUPTHER

## 2022-04-21 RX ORDER — CARVEDILOL 25 MG/1
TABLET ORAL
COMMUNITY
Start: 2022-04-15

## 2022-04-21 RX ORDER — FUROSEMIDE 40 MG/1
40 TABLET ORAL DAILY
Qty: 30 TABLET | Refills: 0 | Status: SHIPPED | OUTPATIENT
Start: 2022-04-21

## 2022-04-21 NOTE — PROGRESS NOTES
Room: 2    Identified pt with two pt identifiers(name and ). Reviewed record in preparation for visit and have obtained necessary documentation. All patient medications has been reviewed. Chief Complaint   Patient presents with    Follow-up     2 month   [facility] Follow Up     seen at [facility] last week for fluid buildup - admitted       Health Maintenance Due   Topic    COVID-19 Vaccine (1)    Colorectal Cancer Screening Combo     Shingrix Vaccine Age 50> (1 of 2)       Vitals:    22 1132   BP: 132/84   Pulse: 90   Resp: 16   Temp: 98 °F (36.7 °C)   TempSrc: Oral   SpO2: 98%   Weight: 260 lb (117.9 kg)   Height: 5' 10\" (1.778 m)   PainSc:   8   PainLoc: Leg       4. Have you been to the ER, urgent care clinic since your last visit? Hospitalized since your last visit? No    5. Have you seen or consulted any other health care providers outside of the [address] [place] [address] since your last visit? Include any pap smears or colon screening. No    Patient is accompanied by patient and wife I have received verbal consent from Bisi Cooney to discuss any/all medical information while they are present in the room.

## 2022-04-21 NOTE — PROGRESS NOTES
Office Visit Note:    Assessment/Plan:  1. Left ventricular failure ([facility] [address]    2. Essential hypertension    3. Post-COVID chronic neurologic symptoms    4. Chronic midline low back pain without sciatica    5. Moderate episode of recurrent major depressive disorder ([facility] [address]    6. Suspected pulmonary embolism    7. Healthcare maintenance      1. Left ventricular failure-the patient was recently [facility] and was told that he has left ventricular failure. He does not know his EF. He has an appointment to see a cardiologist.  He was discharged home on Lasix 40 mg for 5 days. I will check a BMP on him today. We will refill his Lasix to his take until he sees his cardiologist.  He still has some significant edema. He is on Coreg. I suspect he will need an ischemic work-up. 2. Hypertension-blood pressure well controlled, continue on current regimen  3. Post COVID-19 symptoms-he did have some neurological symptoms from post COVID. He does have some numbness in his lower extremities which he thinks is post COVID syndrome. We will wait to see if physical therapy improves her symptoms. 4. Chronic low back pain-Will advised to continue with physical therapy, but if no improvement can refer to orthopedics   5. suspected PE-completed 3 months of anticoagulation  6. Depression-on Lexapro, stable    Health Maintenance:  Immunizations:  Tetanus: Due 2032  Shingles: Shingles Vaccine ordered  Covid:  Advised to get Covid shots after 1 month    Screening:  Colon cancer screening: Referred to GI for colonoscopy in last visit  Prostate cancer: Normal PSA   Hepatitis C: Negative  HIV: Negative  Diabetes: A1c negative  Exercise: not much  Diet: good  Sleep: fair    Orders Placed This Encounter    METABOLIC PANEL, BASIC     Standing Status:   Future     Standing Expiration Date:   4/21/2023    carvediloL (COREG) 25 mg tablet     Sig: TAKE 1 TABLET BY MOUTH TWICE DAILY AT 6 AM AND 6 PM    DISCONTD: furosemide (LASIX) 40 mg tablet     Sig: Take 40 mg by mouth daily.  furosemide (LASIX) 40 mg tablet     Sig: Take 1 Tablet by mouth daily. Dispense:  30 Tablet     Refill:  0     Social Determinants of Health     Tobacco Use: Low Risk     Smoking Tobacco Use: Never Smoker    Smokeless Tobacco Use: Never Used   Alcohol Use:     Frequency of Alcohol Consumption: Not on file    Average Number of Drinks: Not on file    Frequency of Binge Drinking: Not on file   Financial Resource Strain:     Difficulty of Paying Living Expenses: Not on file   Food Insecurity:     Worried About Running Out of Food in the Last Year: Not on file    Jerilyn of Food in the Last Year: Not on file   Transportation Needs:     Lack of Transportation (Medical): Not on file    Lack of Transportation (Non-Medical): Not on file   Physical Activity:     Days of Exercise per Week: Not on file    Minutes of Exercise per Session: Not on file   Stress:     Feeling of Stress : Not on file   Social Connections:     Frequency of Communication with Friends and Family: Not on file    Frequency of Social Gatherings with Friends and Family: Not on file    Attends Yarsani Services: Not on file    Active Member of [address] [place] [address] or Organizations: Not on file    Attends Club or Organization Meetings: Not on file    Marital Status: Not on file   Intimate Partner Violence:     Fear of Current or Ex-Partner: Not on file    Emotionally Abused: Not on file    Physically Abused: Not on file    Sexually Abused: Not on file   Depression: Not at risk    PHQ-2 Score: 0   Housing Stability:     Unable to Pay for Housing in the Last Year: Not on file    Number of [place] in the Last Year: Not on file    Unstable Housing in the Last Year: Not on file       Follow-up and Dispositions    · Return in about 3 months (around 7/21/2022). I have reviewed with the patient details of the assessment and plan and all questions were answered.  Relevant patient education was performed. The most recent lab findings were reviewed with the patient. An After Visit Summary was printed and given to the patient. Reason for Visit: Follow-up (2 month) and Hospital Follow Up (seen at [facility] last week for fluid buildup - admitted)    Subjective:  50-year-old with history of hypertension, recent COVID infection, suspected PE who comes as a hospital follow-up. He was recently admitted to [facility] with shortness of breath and was told that he had left heart failure. He has an appointment to see a cardiologist in the next couple of weeks. He was discharged home on Lasix. He states his breathing is improved and is close to baseline but he still continues to have significant edema. Review of Systems  A complete 11 system ROS was preformed (constitutional, eyes, ENT, cardiovascular, respiratory, gastrointestinal, genitourinary, musculoskeletal, skin, neurological, psychiatric) and was negative aside from the pertinent positives and negatives noted in the HPI. Past Medical History:   Diagnosis Date    Hypertension      History reviewed. No pertinent surgical history. Social History     Socioeconomic History    Marital status:    Tobacco Use    Smoking status: Never Smoker    Smokeless tobacco: Never Used   Vaping Use    Vaping Use: Never used   Substance and Sexual Activity    Alcohol use: Not Currently    Drug use: Never     History reviewed. No pertinent family history. Current Outpatient Medications   Medication Sig Dispense Refill    carvediloL (COREG) 25 mg tablet TAKE 1 TABLET BY MOUTH TWICE DAILY AT 6 AM AND 6 PM      furosemide (LASIX) 40 mg tablet Take 1 Tablet by mouth daily. 30 Tablet 0    amLODIPine (NORVASC) 10 mg tablet Take 1 Tablet by mouth daily. 90 Tablet 2    hydroCHLOROthiazide (HYDRODIURIL) 12.5 mg tablet Take 1 Tablet by mouth daily.  (Patient not taking: Reported on 4/21/2022) 90 Tablet 2    escitalopram oxalate (LEXAPRO) 10 mg tablet Take 1 Tablet by mouth daily. (Patient not taking: Reported on 4/21/2022) 30 Tablet 2     No Known Allergies    Objective:  Visit Vitals  /84 (BP 1 Location: Left arm, BP Patient Position: Sitting)   Pulse 90   Temp 98 °F (36.7 °C) (Oral)   Resp 16   Ht 5' 10\" (1.778 m)   Wt 260 lb (117.9 kg)   SpO2 98%   BMI 37.31 kg/m²     Physical Exam:   AA&O x3. Not pale, not in any distress. HEENT: ENT negative. Lungs: clear  Heart: S1 S2 +, RRR  Abdomen: Soft, No tenderness  Neuro: No focal deficits. Skin: No erythema or lesions noted. Extremities: Bilateral pedal edema noted  Psych: Normal affect and mood. Results for orders placed or performed in visit on 02/17/22   PSA SCREENING (SCREENING)   Result Value Ref Range    Prostate Specific Ag 1.0 0.01 - 4.0 ng/mL   LIPID PANEL   Result Value Ref Range    Cholesterol, total 224 (H) <200 MG/DL    Triglyceride 145 <150 MG/DL    HDL Cholesterol 44 MG/DL    LDL, calculated 151 (H) 0 - 100 MG/DL    VLDL, calculated 29 MG/DL    CHOL/HDL Ratio 5.1 (H) 0.0 - 5.0     HIV 1/2 AG/AB, 4TH GENERATION,W RFLX CONFIRM   Result Value Ref Range    HIV 1/2 Interpretation NONREACTIVE NONREACTIVE      HIV 1/2 result comment SEE NOTE     HCV AB W/RFLX TO SOUTH   Result Value Ref Range    HCV Ab <0.1 0.0 - 0.9 s/co ratio   HEMOGLOBIN A1C WITH EAG   Result Value Ref Range    Hemoglobin A1c 5.1 4.0 - 5.6 %    Est. average glucose 100 mg/dL   HCV INTERPRETATION   Result Value Ref Range    HCV Interpretation Comment         Lawyer James MD, FACP, [facility].   [address] [address], [place], [place].

## 2022-04-21 NOTE — PATIENT INSTRUCTIONS
Back Pain: Care Instructions  Your Care Instructions     Back pain has many possible causes. It is often related to problems with muscles and ligaments of the back. It may also be related to problems with the nerves, discs, or bones of the back. Moving, lifting, standing, sitting, or sleeping in an awkward way can strain the back. Sometimes you don't notice the injury until later. Arthritis is another common cause of back pain. Although it may hurt a lot, back pain usually improves on its own within several weeks. Most people recover in 12 weeks or less. Using good home treatment and being careful not to stress your back can help you feel better sooner. Follow-up care is a key part of your treatment and safety. Be sure to make and go to all appointments, and call your doctor if you are having problems. It's also a good idea to know your test results and keep a list of the medicines you take. How can you care for yourself at home? · Sit or lie in positions that are most comfortable and reduce your pain. Try one of these positions when you lie down:  ? Lie on your back with your knees bent and supported by large pillows. ? Lie on the floor with your legs on the seat of a sofa or chair. ? Lie on your side with your knees and hips bent and a pillow between your legs. ? Lie on your stomach if it does not make pain worse. · Do not sit up in bed, and avoid soft couches and twisted positions. Bed rest can help relieve pain at first, but it delays healing. Avoid bed rest after the first day of back pain. · Change positions every 30 minutes. If you must sit for long periods of time, take breaks from sitting. Get up and walk around, or lie in a comfortable position. · Try using a heating pad on a low or medium setting for 15 to 20 minutes every 2 or 3 hours. Try a warm shower in place of one session with the heating pad. · You can also try an ice pack for 10 to 15 minutes every 2 to 3 hours.  Put a thin cloth between the ice pack and your skin. · Take pain medicines exactly as directed. ? If the doctor gave you a prescription medicine for pain, take it as prescribed. ? If you are not taking a prescription pain medicine, ask your doctor if you can take an over-the-counter medicine. · Take short walks several times a day. You can start with 5 to 10 minutes, 3 or 4 times a day, and work up to longer walks. Walk on level surfaces and avoid hills and stairs until your back is better. · Return to work and other activities as soon as you can. Continued rest without activity is usually not good for your back. · To prevent future back pain, do exercises to stretch and strengthen your back and stomach. Learn how to use good posture, safe lifting techniques, and proper body mechanics. When should you call for help? Call your doctor now or seek immediate medical care if:    · You have new or worsening numbness in your legs.     · You have new or worsening weakness in your legs. (This could make it hard to stand up.)     · You lose control of your bladder or bowels. Watch closely for changes in your health, and be sure to contact your doctor if:    · You have a fever, lose weight, or don't feel well.     · You do not get better as expected. Where can you learn more? Go to http://www.winter.com/  Enter I594 in the search box to learn more about \"Back Pain: Care Instructions. \"  Current as of: July 1, 2021               Content Version: 13.2  © 7985-7340 [employer]. Care instructions adapted under license by [employer] (which disclaims liability or warranty for this information). If you have questions about a medical condition or this instruction, always ask your healthcare professional. [place] [address] any warranty or liability for your use of this information.

## 2022-04-22 LAB
BUN SERPL-MCNC: 23 MG/DL (ref 6–24)
BUN/CREAT SERPL: 11 (ref 9–20)
CALCIUM SERPL-MCNC: 8.2 MG/DL (ref 8.7–10.2)
CHLORIDE SERPL-SCNC: 101 MMOL/L (ref 96–106)
CO2 SERPL-SCNC: 24 MMOL/L (ref 20–29)
CREAT SERPL-MCNC: 2.12 MG/DL (ref 0.76–1.27)
EGFR: 37 ML/MIN/1.73
GLUCOSE SERPL-MCNC: 95 MG/DL (ref 65–99)
INTERPRETATION: NORMAL
POTASSIUM SERPL-SCNC: 3.7 MMOL/L (ref 3.5–5.2)
SODIUM SERPL-SCNC: 143 MMOL/L (ref 134–144)

## 2022-04-25 ENCOUNTER — HOSPITAL ENCOUNTER (OUTPATIENT)
Dept: PHYSICAL THERAPY | Age: 52
End: 2022-04-25
Payer: MEDICAID

## 2022-04-27 ENCOUNTER — APPOINTMENT (OUTPATIENT)
Dept: PHYSICAL THERAPY | Age: 52
End: 2022-04-27
Payer: MEDICAID

## 2022-05-02 ENCOUNTER — HOSPITAL ENCOUNTER (OUTPATIENT)
Dept: PHYSICAL THERAPY | Age: 52
Discharge: HOME | End: 2022-05-02
Payer: MEDICAID

## 2022-05-02 PROCEDURE — 97110 THERAPEUTIC EXERCISES: CPT

## 2022-05-02 NOTE — PROGRESS NOTES
PT DAILY TREATMENT NOTE - [facility] 2-15    Patient Name: Boris Durbin  Date:2022  : 1970  [x]  Patient  Verified  Payor: Sorin Bjork / Plan: [facility] / Product Type: Managed Care Medicaid /    In time: 11:35A  Out time: 12:06P  Total Treatment Time (min): 31  Total Timed Codes (min): 31  1:1 Treatment Time ( [address] only): --   Visit #:  2    Treatment Area: Other low back pain [M54.59]    SUBJECTIVE  Pain Level (0-10 scale): 8/10  Any medication changes, allergies to medications, adverse drug reactions, diagnosis change, or new procedure performed?: [x] No    [] Yes (see summary sheet for update)  Subjective functional status/changes:   [] No changes reported  Pt reported going to the hospital last Saturday. He had fluid around his heart from the PE. Was d/c last Thursday. He was advised to he could continue with PT.     OBJECTIVE       31 min Therapeutic Exercise:  [x] See flow sheet :   Rationale: increase ROM, increase strength, improve coordination, improve balance and increase proprioception to improve the patients ability to increase function and mobility    With   [] TE   [] TA   [] Neuro   [] SC   [] other: Patient Education: [x] Review HEP    [] Progressed/Changed HEP based on:   [] positioning   [] body mechanics   [] transfers   [] heat/ice application    [] other:      Other Objective/Functional Measures: --     Pain Level (0-10 scale) post treatment: 6/10    ASSESSMENT/Changes in Function:   Pt's O2 would drop with inclined positioning and exercises to 80% and 77% after open books exercises. Pt would require 5-7 min rest break for O2 to return to baseline of 93%. Pt reported reduction in back pain following session. Will assess response next session.   Patient will continue to benefit from skilled PT services to modify and progress therapeutic interventions, address functional mobility deficits, address ROM deficits, address strength deficits, analyze and address soft tissue restrictions, analyze and cue movement patterns, analyze and modify body mechanics/ergonomics and assess and modify postural abnormalities to attain remaining goals. []  See Plan of Care  []  See progress note/recertification  []  See Discharge Summary         Progress towards goals / Updated goals:  Short Term Goals: To be accomplished in 2-4 treatments:               1. Pt will be compliant with PT attendance and HEP use  Long Term Goals: To be accomplished in 10-12 treatments:               1.  Pt will be able to walk around the block without significant back pain              2. Pt will be able to stand to cook a meal without significant back discomfort              3. Pt will be able to perform light yardwork without back pain              4. Pt will be able to self-manage care using updated HEP for improved independence              5. Improved FOTO score to 51 or better to demonstrate improved function  Frequency / Duration: Patient to be seen 1-2 times per week for 10-12 treatments.       PLAN  [x]  Upgrade activities as tolerated     [x]  Continue plan of care  []  Update interventions per flow sheet       []  Discharge due to:_  []  Other:_      Tivis Fingal, PTA, [facility], [facility] 5/2/2022

## 2022-05-04 ENCOUNTER — HOSPITAL ENCOUNTER (OUTPATIENT)
Dept: PHYSICAL THERAPY | Age: 52
Discharge: HOME | End: 2022-05-04
Payer: MEDICAID

## 2022-05-04 PROCEDURE — 97110 THERAPEUTIC EXERCISES: CPT | Performed by: PHYSICAL THERAPIST

## 2022-05-04 PROCEDURE — 97140 MANUAL THERAPY 1/> REGIONS: CPT | Performed by: PHYSICAL THERAPIST

## 2022-05-04 NOTE — PROGRESS NOTES
PT DAILY TREATMENT NOTE 2-15    Patient Name: Maurilio See  Date:2022  : 1970  [x]  Patient  Verified  Payor: Audrey TEMITOPE Sky / Plan: Shirin Hickey / Product Type: Managed Care Medicaid /    In time: 228p  Out time: 315p  Total Treatment Time (min): 47  Visit #:  3    Treatment Area: Other low back pain [M54.59]    SUBJECTIVE  Pain Level (0-10 scale): 7  Any medication changes, allergies to medications, adverse drug reactions, diagnosis change, or new procedure performed?: [x] No    [] Yes (see summary sheet for update)  Subjective functional status/changes:   [] No changes reported  Doing okay today. Back feels tight when he is up and moving around.      OBJECTIVE  Modality rationale: decrease pain and increase tissue extensibility to improve the patients ability to move with reduced pain   Min Type Additional Details       [] Estim: []Att   []Unatt    []TENS instruct                  []IFC  []Premod   []NMES                     []Other:  []w/US   []w/ice   []w/heat  Position:  Location:       []  Traction: [] Cervical       []Lumbar                       [] Prone          []Supine                       []Intermittent   []Continuous Lbs:  [] before manual  [] after manual  []w/heat    []  Ultrasound: []Continuous   [] Pulsed                       at: []1MHz   []3MHz Location:  W/cm2:    [] Paraffin         Location:   []w/heat   10 []  Ice     [x]  Heat  []  Ice massage Position: reclined  Location: back    []  Laser  []  Other: Position:  Location:      []  Vasopneumatic Device Pressure:       [] lo [] med [] hi   Temperature:      [x] Skin assessment post-treatment:  [x]intact []redness- no adverse reaction    []redness - adverse reaction:     10 min Manual Therapy: sidelying STM/MFR to bilateral paraspinals    Rationale: decrease pain, increase ROM, increase tissue extensibility, decrease trigger points and increase postural awareness to improve the patients ability to move with reduced pain    27 min Therapeutic Exercise:  [x]? See flow sheet :   Rationale: increase ROM, increase strength, improve coordination, improve balance and increase proprioception to improve the patients ability to increase function and mobility     With   []? TE   []? TA   []? Neuro   []? SC   []? other: Patient Education: [x]? Review HEP    []? Progressed/Changed HEP based on:   []? positioning   []? body mechanics   []? transfers   []? heat/ice application    []? other:       Other Objective/Functional Measures: --        Pain Level (0-10 scale) post treatment: 4/10     ASSESSMENT/Changes in Function:   02 did remain in 95%+ today, but interventions limited to avoid any drop. Secondary to this and his other current medical issues, we discussed having him manage on his own at home until his heart and lung issues are under control and he may return thereafter. Patient will continue to benefit from skilled PT services to modify and progress therapeutic interventions, address functional mobility deficits, address ROM deficits, address strength deficits, analyze and address soft tissue restrictions, analyze and cue movement patterns, analyze and modify body mechanics/ergonomics and assess and modify postural abnormalities to attain remaining goals. []? See Plan of Care  []? See progress note/recertification  []? See Discharge Summary         Progress towards goals / Updated goals:  Short Term Goals: To be accomplished in 2-4 treatments:               1. Pt will be compliant with PT attendance and HEP use MET  Long Term Goals: To be accomplished in 10-12 treatments:               1.  Pt will be able to walk around the block without significant back pain              2. Pt will be able to stand to cook a meal without significant back discomfort              3. Pt will be able to perform light yardwork without back pain              4. Pt will be able to self-manage care using updated HEP for improved independence              5. Improved FOTO score to 51 or better to demonstrate improved function  Frequency / Duration: Patient to be seen 1-2 times per week for 10-12 treatments.        PLAN  [x]? Upgrade activities as tolerated     [x]? Continue plan of care  []? Update interventions per flow sheet       []? Discharge due to:_  []?   Other:_      Rosa Edwards, PT, DPT 5/4/2022

## 2022-05-23 NOTE — PROGRESS NOTES
Transition Plan of Care  RUR 13%-Med  Disposition-Covid positive and unvaccinated. Currently on 25 lpm. He is independent with all ADLs. Once medically stable will likely discharge home with wife.  May need home oxygen at discharge depending on ability to wean none

## 2022-07-26 NOTE — THERAPY DISCHARGE
Physical Therapy at [facility] by [facility],   a part of [address] [place] [address]  [address] [address], [address] [place] [address], [address] [place] [address]  Phone: [phone]  Fax: [phone]    Medicaid Discharge Summary  2-15    Patient name: Nidia Smalls  : 1970  Provider#: [id number]  Referral source: Mono Nunez MD      Medical/Treatment Diagnosis: Other low back pain [M54.59]     Prior Hospitalization: see medical history     Comorbidities: post-COVID syndrome, HTN, depression  Prior Level of Function: limited function over last few years  Medications: Verified on Patient Summary List     Start of Care: 22                                                                     Onset Date: chronic, 2019    Visits from Start of Care: 3    Missed Visits: 1  Reporting Period : 22 to 22    ASSESSMENT/SUMMARY OF CARE: Mr. Avani Hernandez was seen for 3 sessions regarding his LBP. Treatment was limited due to his ongoing post-COVID health issues, and we discussed having him continue to manage his care on his own after his 22 session. Will d/c from care. Short Term Goals: To be accomplished in 2-4 treatments:               1. Pt will be compliant with PT attendance and HEP use MET    Long Term Goals:  To be accomplished in 10-12 treatments: NOT ABLE TO ACHIEVE FOR ALL LONG TERM GOALS               1. Pt will be able to walk around the block without significant back pain               2. Pt will be able to stand to cook a meal without significant back discomfort              3. Pt will be able to perform light yardwork without back pain              4. Pt will be able to self-manage care using updated HEP for improved independence              5. Improved FOTO score to 51 or better to demonstrate improved function    RECOMMENDATIONS:  [x]Discontinue therapy: []Patient has reached or is progressing toward set goals      [x]Difficulty participating in PT secondary to other health issues      []Due to lack of appreciable progress towards set goals    Huyne Johnson PT, DPT 7/26/2022     ______________________________________________________________________    NOTE TO PHYSICIAN:  Please complete the following and fax to:  Physical Therapy at [facility], a part of [address] [place] [address]: Fax: [phone] . Marquis Williamson Retain this original for your records. If you are unable to process this request in 24 hours, please contact our office.      Physician's Signature:____________________  Date:____________Time:_________           Lisa Yancey MD

## 2023-01-05 ENCOUNTER — TRANSCRIBE ORDER (OUTPATIENT)
Dept: SCHEDULING | Age: 53
End: 2023-01-05

## 2023-01-05 DIAGNOSIS — M54.32 BILATERAL SCIATICA: ICD-10-CM

## 2023-01-05 DIAGNOSIS — M51.36 DISCOGENIC LOW BACK PAIN: ICD-10-CM

## 2023-01-05 DIAGNOSIS — M54.50 LUMBAR PAIN: ICD-10-CM

## 2023-01-05 DIAGNOSIS — M54.31 BILATERAL SCIATICA: ICD-10-CM

## 2023-01-05 DIAGNOSIS — M43.16 SPONDYLOLISTHESIS OF LUMBAR REGION: Primary | ICD-10-CM

## 2023-01-25 ENCOUNTER — TRANSCRIBE ORDER (OUTPATIENT)
Dept: SCHEDULING | Age: 53
End: 2023-01-25

## 2023-01-25 DIAGNOSIS — M43.16 SPONDYLOLISTHESIS OF LUMBAR REGION: Primary | ICD-10-CM

## 2023-01-25 DIAGNOSIS — M54.50 LUMBAR PAIN: ICD-10-CM

## 2023-01-25 DIAGNOSIS — M51.36 DISCOGENIC LOW BACK PAIN: ICD-10-CM

## 2023-01-25 DIAGNOSIS — M54.32 BILATERAL SCIATICA: ICD-10-CM

## 2023-01-25 DIAGNOSIS — M54.31 BILATERAL SCIATICA: ICD-10-CM

## 2024-10-15 NOTE — PROGRESS NOTES
Office Visit Note:    Assessment/Plan:  1. COVID-19    2. Suspected pulmonary embolism      1. Post COVID 19-he is now off oxygen. He still has some mental status changes which could be delirium but his wife is planning to take him to a psychiatrist today. advised him to slowly increase his exercise tolerance. 2. Suspected PE-VQ scan which showed indeterminate for PE, on Eliquis, plan to complete 3 months of treatment  3. Hypertension blood pressure elevated today but the wife says he did not take his medication today  will discuss healthcare maintenance in detail in the next visit. No orders of the defined types were placed in this encounter. Social Determinants of Health     Tobacco Use: Low Risk     Smoking Tobacco Use: Never Smoker    Smokeless Tobacco Use: Never Used   Alcohol Use:     Frequency of Alcohol Consumption: Not on file    Average Number of Drinks: Not on file    Frequency of Binge Drinking: Not on file   Financial Resource Strain:     Difficulty of Paying Living Expenses: Not on file   Food Insecurity:     Worried About Running Out of Food in the Last Year: Not on file    Jerilyn of Food in the Last Year: Not on file   Transportation Needs:     Lack of Transportation (Medical): Not on file    Lack of Transportation (Non-Medical):  Not on file   Physical Activity:     Days of Exercise per Week: Not on file    Minutes of Exercise per Session: Not on file   Stress:     Feeling of Stress : Not on file   Social Connections:     Frequency of Communication with Friends and Family: Not on file    Frequency of Social Gatherings with Friends and Family: Not on file    Attends Roman [employer] Services: Not on file    Active Member of Clubs or Organizations: Not on file    Attends Club or Organization Meetings: Not on file    Marital Status: Not on file   Intimate Partner Violence:     Fear of Current or Ex-Partner: Not on file    Emotionally Abused: Not on file    Physically Abused: Not on file    Sexually Abused: Not on file   Depression: Not at risk    PHQ-2 Score: 0   Housing Stability:     Unable to Pay for Housing in the Last Year: Not on file    Number of Places Lived in the Last Year: Not on file    Unstable Housing in the Last Year: Not on file       Follow-up and Dispositions    · Return in about 3 months (around 4/19/2022). I have reviewed with the patient details of the assessment and plan and all questions were answered. Relevant patient education was performed. The most recent lab findings were reviewed with the patient. An After Visit Summary was printed and given to the patient. Reason for Visit: Form Completion    Subjective:  50-year-old who was recently admitted to the hospital with COVID-19 comes for follow-up. He is mainly here for [place] paperwork for his wife who is taking care of him. After discharge she has had some issues with confusion and inability to concentrate and his wife is taking care of him. Since his last visit he is off oxygen and his wife states that she feels that he is a little bit better. He denies any other complaints to me. Review of Systems  A complete 11 system ROS was preformed (constitutional, eyes, ENT, cardiovascular, respiratory, gastrointestinal, genitourinary, musculoskeletal, skin, neurological, psychiatric) and was negative aside from the pertinent positives and negatives noted in the HPI. Past Medical History:   Diagnosis Date    Hypertension      History reviewed. No pertinent surgical history. Social History     Socioeconomic History    Marital status:    Tobacco Use    Smoking status: Never Smoker    Smokeless tobacco: Never Used   Vaping Use    Vaping Use: Never used   Substance and Sexual Activity    Alcohol use: Not Currently    Drug use: Never     History reviewed. No pertinent family history.   Current Outpatient Medications   Medication Sig Dispense Refill    baclofen (LIORESAL) 10 mg tablet Take 10 mg by mouth two (2) times daily as needed.  famotidine (PEPCID) 20 mg tablet TAKE 1 TABLET BY MOUTH TWICE DAILY FOR ACID REFLUX      lisinopril-hydroCHLOROthiazide (PRINZIDE, ZESTORETIC) 20-25 mg per tablet Take 1 Tablet by mouth daily.  methylPREDNISolone (MEDROL DOSEPACK) 4 mg tablet Take as directed on package instructions.  amLODIPine (NORVASC) 10 mg tablet Take 1 Tablet by mouth daily. 30 Tablet 2    apixaban (Eliquis) 5 mg tablet Take 1 Tablet by mouth two (2) times a day for 60 days. 60 Tablet 1    carvediloL (COREG) 12.5 mg tablet Take 1 Tablet by mouth two (2) times daily (with meals). 60 Tablet 0     No Known Allergies    Objective:  Visit Vitals  BP (!) 154/98 (BP 1 Location: Left upper arm, BP Patient Position: Sitting, BP Cuff Size: Adult) Comment: no meds today   Pulse 85   Temp 98 °F (36.7 °C) (Oral)   Resp 16   Ht 5' 10\" (1.778 m)   SpO2 95%   BMI 34.72 kg/m²     Physical Exam:   AA&O x3. Not pale, not in any distress. HEENT: ENT negative. Neck: Supple, no JVD or lymphadenopathy. Lungs: clear  Heart: S1 S2 +, RRR  Abdomen: Soft, No tenderness  Neuro: No focal deficits. Skin: No erythema or lesions noted. Extremities: no pedal edema, good peripheral pulses  Psych: Normal affect and mood. Results for orders placed or performed during the hospital encounter of 01/04/22   URINE CULTURE HOLD SAMPLE    Specimen: Serum   Result Value Ref Range    Urine culture hold        Urine on hold in Microbiology dept for 2 days. If unpreserved urine is submitted, it cannot be used for addtional testing after 24 hours, recollection will be required. CBC WITH AUTOMATED DIFF   Result Value Ref Range    WBC 6.9 4.1 - 11.1 K/uL    RBC 4.39 4. 10 - 5.70 M/uL    HGB 12.5 12.1 - 17.0 g/dL    HCT 38.9 36.6 - 50.3 %    MCV 88.6 80.0 - 99.0 FL    MCH 28.5 26.0 - 34.0 PG    MCHC 32.1 30.0 - 36.5 g/dL    RDW 13.9 11.5 - 14.5 %    PLATELET 971 718 - 679 K/uL    MPV 11.5 8.9 - 12.9 FL    NRBC 0.0 0  WBC    ABSOLUTE NRBC 0.00 0.00 - 0.01 K/uL    NEUTROPHILS 87 (H) 32 - 75 %    LYMPHOCYTES 7 (L) 12 - 49 %    MONOCYTES 6 5 - 13 %    EOSINOPHILS 0 0 - 7 %    BASOPHILS 0 0 - 1 %    IMMATURE GRANULOCYTES 0 %    ABS. NEUTROPHILS 6.0 1.8 - 8.0 K/UL    ABS. LYMPHOCYTES 0.5 (L) 0.8 - 3.5 K/UL    ABS. MONOCYTES 0.4 0.0 - 1.0 K/UL    ABS. EOSINOPHILS 0.0 0.0 - 0.4 K/UL    ABS. BASOPHILS 0.0 0.0 - 0.1 K/UL    ABS. IMM. GRANS. 0.0 K/UL    DF MANUAL      PLATELET COMMENTS Large Platelets      RBC COMMENTS NORMOCYTIC, NORMOCHROMIC     METABOLIC PANEL, COMPREHENSIVE   Result Value Ref Range    Sodium 123 (L) 136 - 145 mmol/L    Potassium HEMOLYZED,RECOLLECT REQUESTED 3.5 - 5.1 mmol/L    Chloride 101 97 - 108 mmol/L    CO2 22 21 - 32 mmol/L    Anion gap 0 (L) 5 - 15 mmol/L    Glucose 130 (H) 65 - 100 mg/dL    BUN 49 (H) 6 - 20 MG/DL    Creatinine 2.03 (H) 0.70 - 1.30 MG/DL    BUN/Creatinine ratio 24 (H) 12 - 20      GFR est AA 42 (L) >60 ml/min/1.73m2    GFR est non-AA 35 (L) >60 ml/min/1.73m2    Calcium 8.4 (L) 8.5 - 10.1 MG/DL    Bilirubin, total HEMOLYZED,RECOLLECT REQUESTED 0.2 - 1.0 MG/DL    ALT (SGPT) HEMOLYZED,RECOLLECT REQUESTED 12 - 78 U/L    AST (SGOT) HEMOLYZED,RECOLLECT REQUESTED 15 - 37 U/L    Alk.  phosphatase 47 45 - 117 U/L    Protein, total 8.4 (H) 6.4 - 8.2 g/dL    Albumin 2.0 (L) 3.5 - 5.0 g/dL    Globulin 6.4 (H) 2.0 - 4.0 g/dL    A-G Ratio 0.3 (L) 1.1 - 2.2     POC G3 - PUL   Result Value Ref Range    FIO2 (POC) 91 %    pH (POC) 7.43 7.35 - 7.45      pCO2 (POC) 38.0 35.0 - 45.0 MMHG    pO2 (POC) 71 (L) 80 - 100 MMHG    HCO3 (POC) 25.4 22 - 26 MMOL/L    sO2 (POC) 94.6 92 - 97 %    Base excess (POC) 1.3 mmol/L    Site LEFT RADIAL      Device: High Flow Nasal Cannula      Allens test (POC) Positive      Specimen type (POC) ARTERIAL     METABOLIC PANEL, BASIC   Result Value Ref Range    Sodium 137 136 - 145 mmol/L    Potassium 4.0 3.5 - 5.1 mmol/L    Chloride 105 97 - 108 mmol/L    CO2 24 21 - 32 mmol/L Anion gap 8 5 - 15 mmol/L    Glucose 137 (H) 65 - 100 mg/dL    BUN 52 (H) 6 - 20 MG/DL    Creatinine 2.06 (H) 0.70 - 1.30 MG/DL    BUN/Creatinine ratio 25 (H) 12 - 20      GFR est AA 41 (L) >60 ml/min/1.73m2    GFR est non-AA 34 (L) >60 ml/min/1.73m2    Calcium 8.3 (L) 8.5 - 10.1 MG/DL   SODIUM, UR, RANDOM   Result Value Ref Range    Sodium,urine random 13 MMOL/L   CREATININE, UR, RANDOM   Result Value Ref Range    Creatinine, urine 184.00 mg/dL   CHLORIDE, URINE RANDOM   Result Value Ref Range    Chloride,urine random <10 MMOL/L   C REACTIVE PROTEIN, QT   Result Value Ref Range    C-Reactive protein 3.47 (H) 0.00 - 1.87 mg/dL   METABOLIC PANEL, BASIC   Result Value Ref Range    Sodium 136 136 - 145 mmol/L    Potassium 4.1 3.5 - 5.1 mmol/L    Chloride 103 97 - 108 mmol/L    CO2 24 21 - 32 mmol/L    Anion gap 9 5 - 15 mmol/L    Glucose 119 (H) 65 - 100 mg/dL    BUN 55 (H) 6 - 20 MG/DL    Creatinine 2.17 (H) 0.70 - 1.30 MG/DL    BUN/Creatinine ratio 25 (H) 12 - 20      GFR est AA 39 (L) >60 ml/min/1.73m2    GFR est non-AA 32 (L) >60 ml/min/1.73m2    Calcium 8.2 (L) 8.5 - 10.1 MG/DL   CBC WITH AUTOMATED DIFF   Result Value Ref Range    WBC 9.2 4.1 - 11.1 K/uL    RBC 4.23 4. 10 - 5.70 M/uL    HGB 12.0 (L) 12.1 - 17.0 g/dL    HCT 38.2 36.6 - 50.3 %    MCV 90.3 80.0 - 99.0 FL    MCH 28.4 26.0 - 34.0 PG    MCHC 31.4 30.0 - 36.5 g/dL    RDW 13.1 11.5 - 14.5 %    PLATELET 135 997 - 927 K/uL    MPV 11.5 8.9 - 12.9 FL    NRBC 0.0 0  WBC    ABSOLUTE NRBC 0.00 0.00 - 0.01 K/uL    NEUTROPHILS 74 32 - 75 %    LYMPHOCYTES 19 12 - 49 %    MONOCYTES 7 5 - 13 %    EOSINOPHILS 0 0 - 7 %    BASOPHILS 0 0 - 1 %    IMMATURE GRANULOCYTES 0 %    ABS. NEUTROPHILS 6.9 1.8 - 8.0 K/UL    ABS. LYMPHOCYTES 1.7 0.8 - 3.5 K/UL    ABS. MONOCYTES 0.6 0.0 - 1.0 K/UL    ABS. EOSINOPHILS 0.0 0.0 - 0.4 K/UL    ABS. BASOPHILS 0.0 0.0 - 0.1 K/UL    ABS. IMM.  GRANS. 0.0 K/UL    DF MANUAL      PLATELET COMMENTS Large Platelets      RBC COMMENTS NORMOCYTIC, NORMOCHROMIC      WBC COMMENTS ATYPICAL LYMPHOCYTES PRESENT     PHOSPHORUS   Result Value Ref Range    Phosphorus 3.8 2.6 - 4.7 MG/DL   CBC WITH AUTOMATED DIFF   Result Value Ref Range    WBC 11.4 (H) 4.1 - 11.1 K/uL    RBC 4.38 4.10 - 5.70 M/uL    HGB 12.6 12.1 - 17.0 g/dL    HCT 40.2 36.6 - 50.3 %    MCV 91.8 80.0 - 99.0 FL    MCH 28.8 26.0 - 34.0 PG    MCHC 31.3 30.0 - 36.5 g/dL    RDW 12.8 11.5 - 14.5 %    PLATELET 060 446 - 439 K/uL    MPV 10.6 8.9 - 12.9 FL    NRBC 0.0 0  WBC    ABSOLUTE NRBC 0.00 0.00 - 0.01 K/uL    NEUTROPHILS 88 (H) 32 - 75 %    BAND NEUTROPHILS 1 0 - 6 %    LYMPHOCYTES 6 (L) 12 - 49 %    MONOCYTES 5 5 - 13 %    EOSINOPHILS 0 0 - 7 %    BASOPHILS 0 0 - 1 %    IMMATURE GRANULOCYTES 0 %    ABS. NEUTROPHILS 10.1 (H) 1.8 - 8.0 K/UL    ABS. LYMPHOCYTES 0.7 (L) 0.8 - 3.5 K/UL    ABS. MONOCYTES 0.6 0.0 - 1.0 K/UL    ABS. EOSINOPHILS 0.0 0.0 - 0.4 K/UL    ABS. BASOPHILS 0.0 0.0 - 0.1 K/UL    ABS. IMM. GRANS. 0.0 K/UL    DF MANUAL      PLATELET COMMENTS Large Platelets      RBC COMMENTS NORMOCYTIC, NORMOCHROMIC      WBC COMMENTS HYPERSEGMENTED POLYS     CBC W/O DIFF   Result Value Ref Range    WBC 11.3 (H) 4.1 - 11.1 K/uL    RBC 4.10 4. 10 - 5.70 M/uL    HGB 11.9 (L) 12.1 - 17.0 g/dL    HCT 36.1 (L) 36.6 - 50.3 %    MCV 88.0 80.0 - 99.0 FL    MCH 29.0 26.0 - 34.0 PG    MCHC 33.0 30.0 - 36.5 g/dL    RDW 12.8 11.5 - 14.5 %    PLATELET 821 621 - 576 K/uL    MPV 11.1 8.9 - 12.9 FL    NRBC 0.0 0  WBC    ABSOLUTE NRBC 0.00 0.00 - 7.19 K/uL   METABOLIC PANEL, COMPREHENSIVE   Result Value Ref Range    Sodium 126 (L) 136 - 145 mmol/L    Potassium HEMOLYZED,RECOLLECT REQUESTED 3.5 - 5.1 mmol/L    Chloride 99 97 - 108 mmol/L    CO2 21 21 - 32 mmol/L    Anion gap 6 5 - 15 mmol/L    Glucose 176 (H) 65 - 100 mg/dL    BUN 53 (H) 6 - 20 MG/DL    Creatinine 1.92 (H) 0.70 - 1.30 MG/DL    BUN/Creatinine ratio 28 (H) 12 - 20      GFR est AA 45 (L) >60 ml/min/1.73m2    GFR est non-AA 37 (L) >60 ml/min/1.73m2    Calcium 7.8 (L) 8.5 - 10.1 MG/DL    Bilirubin, total 0.8 0.2 - 1.0 MG/DL    ALT (SGPT) 66 12 - 78 U/L    AST (SGOT) 94 (H) 15 - 37 U/L    Alk.  phosphatase 48 45 - 117 U/L    Protein, total 7.0 6.4 - 8.2 g/dL    Albumin 2.4 (L) 3.5 - 5.0 g/dL    Globulin 4.6 (H) 2.0 - 4.0 g/dL    A-G Ratio 0.5 (L) 1.1 - 2.2     MAGNESIUM   Result Value Ref Range    Magnesium 2.2 1.6 - 2.4 mg/dL   METABOLIC PANEL, BASIC   Result Value Ref Range    Sodium 133 (L) 136 - 145 mmol/L    Potassium 4.0 3.5 - 5.1 mmol/L    Chloride 100 97 - 108 mmol/L    CO2 26 21 - 32 mmol/L    Anion gap 7 5 - 15 mmol/L    Glucose 163 (H) 65 - 100 mg/dL    BUN 51 (H) 6 - 20 MG/DL    Creatinine 1.80 (H) 0.70 - 1.30 MG/DL    BUN/Creatinine ratio 28 (H) 12 - 20      GFR est AA 48 (L) >60 ml/min/1.73m2    GFR est non-AA 40 (L) >60 ml/min/1.73m2    Calcium 8.0 (L) 8.5 - 10.1 MG/DL   RENAL FUNCTION PANEL   Result Value Ref Range    Sodium 133 (L) 136 - 145 mmol/L    Potassium 4.2 3.5 - 5.1 mmol/L    Chloride 100 97 - 108 mmol/L    CO2 27 21 - 32 mmol/L    Anion gap 6 5 - 15 mmol/L    Glucose 119 (H) 65 - 100 mg/dL    BUN 54 (H) 6 - 20 MG/DL    Creatinine 1.88 (H) 0.70 - 1.30 MG/DL    BUN/Creatinine ratio 29 (H) 12 - 20      GFR est AA 46 (L) >60 ml/min/1.73m2    GFR est non-AA 38 (L) >60 ml/min/1.73m2    Calcium 8.1 (L) 8.5 - 10.1 MG/DL    Phosphorus 3.6 2.6 - 4.7 MG/DL    Albumin 2.7 (L) 3.5 - 5.0 g/dL   MAGNESIUM   Result Value Ref Range    Magnesium 2.1 1.6 - 2.4 mg/dL   CBC WITH AUTOMATED DIFF   Result Value Ref Range    WBC 9.9 4.1 - 11.1 K/uL    RBC 4.15 4.10 - 5.70 M/uL    HGB 11.8 (L) 12.1 - 17.0 g/dL    HCT 37.0 36.6 - 50.3 %    MCV 89.2 80.0 - 99.0 FL    MCH 28.4 26.0 - 34.0 PG    MCHC 31.9 30.0 - 36.5 g/dL    RDW 12.7 11.5 - 14.5 %    PLATELET 304 864 - 326 K/uL    MPV 10.7 8.9 - 12.9 FL    NRBC 0.0 0  WBC    ABSOLUTE NRBC 0.00 0.00 - 0.01 K/uL    NEUTROPHILS 81 (H) 32 - 75 %    BAND NEUTROPHILS 3 0 - 6 %    LYMPHOCYTES 9 (L) 12 - 49 %    MONOCYTES 7 5 - 13 %    EOSINOPHILS 0 0 - 7 %    BASOPHILS 0 0 - 1 %    IMMATURE GRANULOCYTES 0 %    ABS. NEUTROPHILS 8.3 (H) 1.8 - 8.0 K/UL    ABS. LYMPHOCYTES 0.9 0.8 - 3.5 K/UL    ABS. MONOCYTES 0.7 0.0 - 1.0 K/UL    ABS. EOSINOPHILS 0.0 0.0 - 0.4 K/UL    ABS. BASOPHILS 0.0 0.0 - 0.1 K/UL    ABS. IMM. GRANS. 0.0 K/UL    DF MANUAL      PLATELET COMMENTS Large Platelets      RBC COMMENTS NORMOCYTIC, NORMOCHROMIC      WBC COMMENTS HYPERSEGMENTED POLYS         Jaziel Guerra MD, FACP, [facility].   [address], [place], [address] [facility] [address] (50-75%)